# Patient Record
Sex: FEMALE | Race: WHITE | Employment: OTHER | URBAN - METROPOLITAN AREA
[De-identification: names, ages, dates, MRNs, and addresses within clinical notes are randomized per-mention and may not be internally consistent; named-entity substitution may affect disease eponyms.]

---

## 2017-01-30 ENCOUNTER — TELEPHONE (OUTPATIENT)
Dept: INTERNAL MEDICINE | Age: 72
End: 2017-01-30

## 2017-01-30 ENCOUNTER — OFFICE VISIT (OUTPATIENT)
Dept: INTERNAL MEDICINE | Age: 72
End: 2017-01-30

## 2017-01-30 VITALS
DIASTOLIC BLOOD PRESSURE: 82 MMHG | WEIGHT: 107 LBS | SYSTOLIC BLOOD PRESSURE: 136 MMHG | BODY MASS INDEX: 19.69 KG/M2 | HEIGHT: 62 IN

## 2017-01-30 DIAGNOSIS — I25.2 OLD MYOCARDIAL INFARCTION: ICD-10-CM

## 2017-01-30 DIAGNOSIS — I10 ESSENTIAL HYPERTENSION, BENIGN: Primary | ICD-10-CM

## 2017-01-30 DIAGNOSIS — F41.1 ANXIETY STATE: ICD-10-CM

## 2017-01-30 DIAGNOSIS — F41.9 ANXIETY: ICD-10-CM

## 2017-01-30 DIAGNOSIS — I99.9 VASCULAR DISEASE: ICD-10-CM

## 2017-01-30 PROCEDURE — G8427 DOCREV CUR MEDS BY ELIG CLIN: HCPCS | Performed by: INTERNAL MEDICINE

## 2017-01-30 PROCEDURE — G8399 PT W/DXA RESULTS DOCUMENT: HCPCS | Performed by: INTERNAL MEDICINE

## 2017-01-30 PROCEDURE — G8419 CALC BMI OUT NRM PARAM NOF/U: HCPCS | Performed by: INTERNAL MEDICINE

## 2017-01-30 PROCEDURE — 1123F ACP DISCUSS/DSCN MKR DOCD: CPT | Performed by: INTERNAL MEDICINE

## 2017-01-30 PROCEDURE — G8598 ASA/ANTIPLAT THER USED: HCPCS | Performed by: INTERNAL MEDICINE

## 2017-01-30 PROCEDURE — 3014F SCREEN MAMMO DOC REV: CPT | Performed by: INTERNAL MEDICINE

## 2017-01-30 PROCEDURE — 3017F COLORECTAL CA SCREEN DOC REV: CPT | Performed by: INTERNAL MEDICINE

## 2017-01-30 PROCEDURE — 99213 OFFICE O/P EST LOW 20 MIN: CPT | Performed by: INTERNAL MEDICINE

## 2017-01-30 PROCEDURE — 1090F PRES/ABSN URINE INCON ASSESS: CPT | Performed by: INTERNAL MEDICINE

## 2017-01-30 PROCEDURE — 1036F TOBACCO NON-USER: CPT | Performed by: INTERNAL MEDICINE

## 2017-01-30 PROCEDURE — G8484 FLU IMMUNIZE NO ADMIN: HCPCS | Performed by: INTERNAL MEDICINE

## 2017-01-30 PROCEDURE — 4040F PNEUMOC VAC/ADMIN/RCVD: CPT | Performed by: INTERNAL MEDICINE

## 2017-01-30 RX ORDER — LORAZEPAM 0.5 MG/1
TABLET ORAL
Qty: 120 TABLET | Refills: 2 | Status: SHIPPED | OUTPATIENT
Start: 2017-01-30 | End: 2017-04-28 | Stop reason: SDUPTHER

## 2017-01-31 ENCOUNTER — TELEPHONE (OUTPATIENT)
Dept: INTERNAL MEDICINE | Age: 72
End: 2017-01-31

## 2017-02-14 ENCOUNTER — TELEPHONE (OUTPATIENT)
Dept: INTERNAL MEDICINE | Age: 72
End: 2017-02-14

## 2017-04-28 ENCOUNTER — OFFICE VISIT (OUTPATIENT)
Dept: INTERNAL MEDICINE | Age: 72
End: 2017-04-28

## 2017-04-28 VITALS
HEIGHT: 62 IN | DIASTOLIC BLOOD PRESSURE: 92 MMHG | SYSTOLIC BLOOD PRESSURE: 132 MMHG | WEIGHT: 106 LBS | BODY MASS INDEX: 19.51 KG/M2

## 2017-04-28 DIAGNOSIS — F41.1 ANXIETY STATE: Primary | ICD-10-CM

## 2017-04-28 DIAGNOSIS — E78.5 HYPERLIPIDEMIA, UNSPECIFIED HYPERLIPIDEMIA TYPE: ICD-10-CM

## 2017-04-28 DIAGNOSIS — F41.9 ANXIETY: ICD-10-CM

## 2017-04-28 PROCEDURE — 3017F COLORECTAL CA SCREEN DOC REV: CPT | Performed by: INTERNAL MEDICINE

## 2017-04-28 PROCEDURE — 3014F SCREEN MAMMO DOC REV: CPT | Performed by: INTERNAL MEDICINE

## 2017-04-28 PROCEDURE — G8598 ASA/ANTIPLAT THER USED: HCPCS | Performed by: INTERNAL MEDICINE

## 2017-04-28 PROCEDURE — 1123F ACP DISCUSS/DSCN MKR DOCD: CPT | Performed by: INTERNAL MEDICINE

## 2017-04-28 PROCEDURE — 4040F PNEUMOC VAC/ADMIN/RCVD: CPT | Performed by: INTERNAL MEDICINE

## 2017-04-28 PROCEDURE — G8399 PT W/DXA RESULTS DOCUMENT: HCPCS | Performed by: INTERNAL MEDICINE

## 2017-04-28 PROCEDURE — G8419 CALC BMI OUT NRM PARAM NOF/U: HCPCS | Performed by: INTERNAL MEDICINE

## 2017-04-28 PROCEDURE — 1090F PRES/ABSN URINE INCON ASSESS: CPT | Performed by: INTERNAL MEDICINE

## 2017-04-28 PROCEDURE — 99213 OFFICE O/P EST LOW 20 MIN: CPT | Performed by: INTERNAL MEDICINE

## 2017-04-28 PROCEDURE — G8427 DOCREV CUR MEDS BY ELIG CLIN: HCPCS | Performed by: INTERNAL MEDICINE

## 2017-04-28 PROCEDURE — 1036F TOBACCO NON-USER: CPT | Performed by: INTERNAL MEDICINE

## 2017-04-28 RX ORDER — LORAZEPAM 0.5 MG/1
TABLET ORAL
Qty: 120 TABLET | Refills: 2 | Status: SHIPPED | OUTPATIENT
Start: 2017-04-28 | End: 2017-07-28 | Stop reason: SDUPTHER

## 2017-07-28 ENCOUNTER — OFFICE VISIT (OUTPATIENT)
Dept: INTERNAL MEDICINE | Age: 72
End: 2017-07-28

## 2017-07-28 VITALS
WEIGHT: 106 LBS | BODY MASS INDEX: 19.51 KG/M2 | HEIGHT: 62 IN | SYSTOLIC BLOOD PRESSURE: 128 MMHG | DIASTOLIC BLOOD PRESSURE: 84 MMHG

## 2017-07-28 DIAGNOSIS — F41.9 ANXIETY: ICD-10-CM

## 2017-07-28 DIAGNOSIS — I10 ESSENTIAL HYPERTENSION, BENIGN: Primary | ICD-10-CM

## 2017-07-28 DIAGNOSIS — H26.9 CATARACTS, BILATERAL: Chronic | ICD-10-CM

## 2017-07-28 DIAGNOSIS — F41.1 ANXIETY STATE: ICD-10-CM

## 2017-07-28 PROCEDURE — G8598 ASA/ANTIPLAT THER USED: HCPCS | Performed by: INTERNAL MEDICINE

## 2017-07-28 PROCEDURE — 3017F COLORECTAL CA SCREEN DOC REV: CPT | Performed by: INTERNAL MEDICINE

## 2017-07-28 PROCEDURE — 4040F PNEUMOC VAC/ADMIN/RCVD: CPT | Performed by: INTERNAL MEDICINE

## 2017-07-28 PROCEDURE — G8427 DOCREV CUR MEDS BY ELIG CLIN: HCPCS | Performed by: INTERNAL MEDICINE

## 2017-07-28 PROCEDURE — 3014F SCREEN MAMMO DOC REV: CPT | Performed by: INTERNAL MEDICINE

## 2017-07-28 PROCEDURE — G8420 CALC BMI NORM PARAMETERS: HCPCS | Performed by: INTERNAL MEDICINE

## 2017-07-28 PROCEDURE — 1090F PRES/ABSN URINE INCON ASSESS: CPT | Performed by: INTERNAL MEDICINE

## 2017-07-28 PROCEDURE — G8399 PT W/DXA RESULTS DOCUMENT: HCPCS | Performed by: INTERNAL MEDICINE

## 2017-07-28 PROCEDURE — 1036F TOBACCO NON-USER: CPT | Performed by: INTERNAL MEDICINE

## 2017-07-28 PROCEDURE — 1123F ACP DISCUSS/DSCN MKR DOCD: CPT | Performed by: INTERNAL MEDICINE

## 2017-07-28 PROCEDURE — 99213 OFFICE O/P EST LOW 20 MIN: CPT | Performed by: INTERNAL MEDICINE

## 2017-07-28 RX ORDER — LORAZEPAM 0.5 MG/1
TABLET ORAL
Qty: 120 TABLET | Refills: 2 | Status: SHIPPED | OUTPATIENT
Start: 2017-07-28 | End: 2017-10-27 | Stop reason: SDUPTHER

## 2017-10-02 ENCOUNTER — TELEPHONE (OUTPATIENT)
Dept: INTERNAL MEDICINE | Age: 72
End: 2017-10-02

## 2017-10-24 ENCOUNTER — TELEPHONE (OUTPATIENT)
Dept: ENT CLINIC | Age: 72
End: 2017-10-24

## 2017-10-24 NOTE — TELEPHONE ENCOUNTER
Patient is scheduled to come in 11/20/17 for a wax cleaning. Her left ear is clogged and she asked if there is anything she can do while waiting for her appointment? Her call back number is [phone]. Thank you.

## 2017-10-27 ENCOUNTER — TELEPHONE (OUTPATIENT)
Dept: INTERNAL MEDICINE | Age: 72
End: 2017-10-27

## 2017-10-27 ENCOUNTER — OFFICE VISIT (OUTPATIENT)
Dept: INTERNAL MEDICINE | Age: 72
End: 2017-10-27

## 2017-10-27 VITALS
SYSTOLIC BLOOD PRESSURE: 176 MMHG | WEIGHT: 102.2 LBS | BODY MASS INDEX: 18.81 KG/M2 | DIASTOLIC BLOOD PRESSURE: 70 MMHG | HEIGHT: 62 IN

## 2017-10-27 DIAGNOSIS — F41.9 ANXIETY: ICD-10-CM

## 2017-10-27 DIAGNOSIS — F41.1 ANXIETY STATE: ICD-10-CM

## 2017-10-27 PROCEDURE — G8427 DOCREV CUR MEDS BY ELIG CLIN: HCPCS | Performed by: INTERNAL MEDICINE

## 2017-10-27 PROCEDURE — 3014F SCREEN MAMMO DOC REV: CPT | Performed by: INTERNAL MEDICINE

## 2017-10-27 PROCEDURE — 3017F COLORECTAL CA SCREEN DOC REV: CPT | Performed by: INTERNAL MEDICINE

## 2017-10-27 PROCEDURE — 1036F TOBACCO NON-USER: CPT | Performed by: INTERNAL MEDICINE

## 2017-10-27 PROCEDURE — 4040F PNEUMOC VAC/ADMIN/RCVD: CPT | Performed by: INTERNAL MEDICINE

## 2017-10-27 PROCEDURE — G8484 FLU IMMUNIZE NO ADMIN: HCPCS | Performed by: INTERNAL MEDICINE

## 2017-10-27 PROCEDURE — 99213 OFFICE O/P EST LOW 20 MIN: CPT | Performed by: INTERNAL MEDICINE

## 2017-10-27 PROCEDURE — G8510 SCR DEP NEG, NO PLAN REQD: HCPCS | Performed by: INTERNAL MEDICINE

## 2017-10-27 PROCEDURE — 3288F FALL RISK ASSESSMENT DOCD: CPT | Performed by: INTERNAL MEDICINE

## 2017-10-27 PROCEDURE — G8598 ASA/ANTIPLAT THER USED: HCPCS | Performed by: INTERNAL MEDICINE

## 2017-10-27 PROCEDURE — 1123F ACP DISCUSS/DSCN MKR DOCD: CPT | Performed by: INTERNAL MEDICINE

## 2017-10-27 PROCEDURE — G8420 CALC BMI NORM PARAMETERS: HCPCS | Performed by: INTERNAL MEDICINE

## 2017-10-27 PROCEDURE — 1090F PRES/ABSN URINE INCON ASSESS: CPT | Performed by: INTERNAL MEDICINE

## 2017-10-27 PROCEDURE — G8399 PT W/DXA RESULTS DOCUMENT: HCPCS | Performed by: INTERNAL MEDICINE

## 2017-10-27 RX ORDER — LORAZEPAM 0.5 MG/1
TABLET ORAL
Qty: 120 TABLET | Refills: 2 | Status: SHIPPED | OUTPATIENT
Start: 2017-10-27 | End: 2018-01-26 | Stop reason: SDUPTHER

## 2017-10-27 ASSESSMENT — PATIENT HEALTH QUESTIONNAIRE - PHQ9
2. FEELING DOWN, DEPRESSED OR HOPELESS: 0
SUM OF ALL RESPONSES TO PHQ9 QUESTIONS 1 & 2: 0
1. LITTLE INTEREST OR PLEASURE IN DOING THINGS: 0
SUM OF ALL RESPONSES TO PHQ QUESTIONS 1-9: 0

## 2017-10-27 NOTE — TELEPHONE ENCOUNTER
Pt calling concerned about her Blood pressure reading during todays OV. Pt states she feels fine but lives alone and would like to make sure she will be okay- Please call Pt to discuss.

## 2017-10-31 ENCOUNTER — TELEPHONE (OUTPATIENT)
Dept: INTERNAL MEDICINE | Age: 72
End: 2017-10-31

## 2017-10-31 ENCOUNTER — OFFICE VISIT (OUTPATIENT)
Dept: INTERNAL MEDICINE | Age: 72
End: 2017-10-31

## 2017-10-31 VITALS
BODY MASS INDEX: 18.58 KG/M2 | SYSTOLIC BLOOD PRESSURE: 140 MMHG | WEIGHT: 101 LBS | HEIGHT: 62 IN | DIASTOLIC BLOOD PRESSURE: 80 MMHG

## 2017-10-31 DIAGNOSIS — I10 ESSENTIAL HYPERTENSION: Primary | ICD-10-CM

## 2017-10-31 PROCEDURE — 1090F PRES/ABSN URINE INCON ASSESS: CPT | Performed by: INTERNAL MEDICINE

## 2017-10-31 PROCEDURE — G8598 ASA/ANTIPLAT THER USED: HCPCS | Performed by: INTERNAL MEDICINE

## 2017-10-31 PROCEDURE — 4040F PNEUMOC VAC/ADMIN/RCVD: CPT | Performed by: INTERNAL MEDICINE

## 2017-10-31 PROCEDURE — 99213 OFFICE O/P EST LOW 20 MIN: CPT | Performed by: INTERNAL MEDICINE

## 2017-10-31 PROCEDURE — G8399 PT W/DXA RESULTS DOCUMENT: HCPCS | Performed by: INTERNAL MEDICINE

## 2017-10-31 PROCEDURE — 1123F ACP DISCUSS/DSCN MKR DOCD: CPT | Performed by: INTERNAL MEDICINE

## 2017-10-31 PROCEDURE — 3017F COLORECTAL CA SCREEN DOC REV: CPT | Performed by: INTERNAL MEDICINE

## 2017-10-31 PROCEDURE — G8484 FLU IMMUNIZE NO ADMIN: HCPCS | Performed by: INTERNAL MEDICINE

## 2017-10-31 PROCEDURE — 1036F TOBACCO NON-USER: CPT | Performed by: INTERNAL MEDICINE

## 2017-10-31 PROCEDURE — G8427 DOCREV CUR MEDS BY ELIG CLIN: HCPCS | Performed by: INTERNAL MEDICINE

## 2017-10-31 PROCEDURE — 3014F SCREEN MAMMO DOC REV: CPT | Performed by: INTERNAL MEDICINE

## 2017-10-31 PROCEDURE — G8419 CALC BMI OUT NRM PARAM NOF/U: HCPCS | Performed by: INTERNAL MEDICINE

## 2017-10-31 NOTE — TELEPHONE ENCOUNTER
Pt was in the office last week to see Dr. Alex Albarado and her BP was elevated. Pt was told to have her BP checked this week. Pt said that she does not have any place near her that can take her BP. Pt would like to come into the office today because she said that she is not feeling well. Pt said that the top of her head feels weird and she has jitters. Pt aware that Dr. Alex Albarado is booked. Pt asked if she can just head to our office now. Pt advised not to walk in without us calling her back. Please call.

## 2017-11-02 ENCOUNTER — TELEPHONE (OUTPATIENT)
Dept: INTERNAL MEDICINE | Age: 72
End: 2017-11-02

## 2017-11-20 ENCOUNTER — OFFICE VISIT (OUTPATIENT)
Dept: ENT CLINIC | Age: 72
End: 2017-11-20

## 2017-11-20 VITALS
SYSTOLIC BLOOD PRESSURE: 181 MMHG | HEIGHT: 62 IN | HEART RATE: 46 BPM | WEIGHT: 102.6 LBS | TEMPERATURE: 97.6 F | DIASTOLIC BLOOD PRESSURE: 74 MMHG | BODY MASS INDEX: 18.88 KG/M2

## 2017-11-20 DIAGNOSIS — H90.2 EXTERNAL EAR CONDUCTIVE HEARING LOSS: ICD-10-CM

## 2017-11-20 DIAGNOSIS — H61.23 BILATERAL IMPACTED CERUMEN: Primary | ICD-10-CM

## 2017-11-20 PROCEDURE — 69210 REMOVE IMPACTED EAR WAX UNI: CPT | Performed by: OTOLARYNGOLOGY

## 2017-11-20 NOTE — PROGRESS NOTES
Cerumen removed both ears with suction and curettes. Tympanic membranes and middle ears normal.  Now patient hearing is subjectively improved.   Follow up: PRN

## 2018-01-26 ENCOUNTER — OFFICE VISIT (OUTPATIENT)
Dept: INTERNAL MEDICINE | Age: 73
End: 2018-01-26

## 2018-01-26 VITALS
WEIGHT: 99 LBS | HEIGHT: 62 IN | DIASTOLIC BLOOD PRESSURE: 80 MMHG | SYSTOLIC BLOOD PRESSURE: 130 MMHG | BODY MASS INDEX: 18.22 KG/M2

## 2018-01-26 DIAGNOSIS — F41.9 ANXIETY: ICD-10-CM

## 2018-01-26 DIAGNOSIS — R63.4 WEIGHT LOSS: Primary | ICD-10-CM

## 2018-01-26 DIAGNOSIS — F41.1 ANXIETY STATE: ICD-10-CM

## 2018-01-26 LAB
A/G RATIO: 2 (ref 1.1–2.2)
ALBUMIN SERPL-MCNC: 4.3 G/DL (ref 3.4–5)
ALP BLD-CCNC: 81 U/L (ref 40–129)
ALT SERPL-CCNC: 32 U/L (ref 10–40)
ANION GAP SERPL CALCULATED.3IONS-SCNC: 12 MMOL/L (ref 3–16)
AST SERPL-CCNC: 31 U/L (ref 15–37)
BASOPHILS ABSOLUTE: 0 K/UL (ref 0–0.2)
BASOPHILS RELATIVE PERCENT: 0.3 %
BILIRUB SERPL-MCNC: 0.6 MG/DL (ref 0–1)
BUN BLDV-MCNC: 12 MG/DL (ref 7–20)
CALCIUM SERPL-MCNC: 9.2 MG/DL (ref 8.3–10.6)
CHLORIDE BLD-SCNC: 100 MMOL/L (ref 99–110)
CO2: 26 MMOL/L (ref 21–32)
CREAT SERPL-MCNC: <0.5 MG/DL (ref 0.6–1.2)
EOSINOPHILS ABSOLUTE: 0 K/UL (ref 0–0.6)
EOSINOPHILS RELATIVE PERCENT: 0.2 %
GFR AFRICAN AMERICAN: >60
GFR NON-AFRICAN AMERICAN: >60
GLOBULIN: 2.1 G/DL
GLUCOSE BLD-MCNC: 105 MG/DL (ref 70–99)
HCT VFR BLD CALC: 36.5 % (ref 36–48)
HEMOGLOBIN: 12.4 G/DL (ref 12–16)
LYMPHOCYTES ABSOLUTE: 1 K/UL (ref 1–5.1)
LYMPHOCYTES RELATIVE PERCENT: 16.6 %
MCH RBC QN AUTO: 31.3 PG (ref 26–34)
MCHC RBC AUTO-ENTMCNC: 34.1 G/DL (ref 31–36)
MCV RBC AUTO: 91.7 FL (ref 80–100)
MONOCYTES ABSOLUTE: 0.4 K/UL (ref 0–1.3)
MONOCYTES RELATIVE PERCENT: 6.6 %
NEUTROPHILS ABSOLUTE: 4.5 K/UL (ref 1.7–7.7)
NEUTROPHILS RELATIVE PERCENT: 76.3 %
PDW BLD-RTO: 14.3 % (ref 12.4–15.4)
PLATELET # BLD: 141 K/UL (ref 135–450)
PMV BLD AUTO: 9 FL (ref 5–10.5)
POTASSIUM SERPL-SCNC: 4.1 MMOL/L (ref 3.5–5.1)
RBC # BLD: 3.98 M/UL (ref 4–5.2)
SODIUM BLD-SCNC: 138 MMOL/L (ref 136–145)
TOTAL PROTEIN: 6.4 G/DL (ref 6.4–8.2)
TSH SERPL DL<=0.05 MIU/L-ACNC: 1.24 UIU/ML (ref 0.27–4.2)
WBC # BLD: 5.9 K/UL (ref 4–11)

## 2018-01-26 PROCEDURE — G8419 CALC BMI OUT NRM PARAM NOF/U: HCPCS | Performed by: INTERNAL MEDICINE

## 2018-01-26 PROCEDURE — 1036F TOBACCO NON-USER: CPT | Performed by: INTERNAL MEDICINE

## 2018-01-26 PROCEDURE — 4040F PNEUMOC VAC/ADMIN/RCVD: CPT | Performed by: INTERNAL MEDICINE

## 2018-01-26 PROCEDURE — 99213 OFFICE O/P EST LOW 20 MIN: CPT | Performed by: INTERNAL MEDICINE

## 2018-01-26 PROCEDURE — 1090F PRES/ABSN URINE INCON ASSESS: CPT | Performed by: INTERNAL MEDICINE

## 2018-01-26 PROCEDURE — 3017F COLORECTAL CA SCREEN DOC REV: CPT | Performed by: INTERNAL MEDICINE

## 2018-01-26 PROCEDURE — 3014F SCREEN MAMMO DOC REV: CPT | Performed by: INTERNAL MEDICINE

## 2018-01-26 PROCEDURE — G8484 FLU IMMUNIZE NO ADMIN: HCPCS | Performed by: INTERNAL MEDICINE

## 2018-01-26 PROCEDURE — G8427 DOCREV CUR MEDS BY ELIG CLIN: HCPCS | Performed by: INTERNAL MEDICINE

## 2018-01-26 PROCEDURE — 1123F ACP DISCUSS/DSCN MKR DOCD: CPT | Performed by: INTERNAL MEDICINE

## 2018-01-26 PROCEDURE — G8399 PT W/DXA RESULTS DOCUMENT: HCPCS | Performed by: INTERNAL MEDICINE

## 2018-01-26 RX ORDER — LORAZEPAM 0.5 MG/1
TABLET ORAL
Qty: 120 TABLET | Refills: 2 | Status: SHIPPED | OUTPATIENT
Start: 2018-01-26 | End: 2018-04-26 | Stop reason: SDUPTHER

## 2018-01-29 ENCOUNTER — TELEPHONE (OUTPATIENT)
Dept: INTERNAL MEDICINE | Age: 73
End: 2018-01-29

## 2018-01-29 NOTE — TELEPHONE ENCOUNTER
Pt calling asking to speak with nurse. Pt states she did labs last week and forgot to tell the DR everything she ate and wants to discuss with nurse.  Please advise

## 2018-01-30 ENCOUNTER — TELEPHONE (OUTPATIENT)
Dept: INTERNAL MEDICINE | Age: 73
End: 2018-01-30

## 2018-01-30 NOTE — TELEPHONE ENCOUNTER
Call returned to patient. Patient wanted to let Brielle Shay know that she drank ensure and also had chicken and green beans for lunch.     Patient advised to continue with her same regimen and she will return to the office in April for a physical.

## 2018-01-30 NOTE — TELEPHONE ENCOUNTER
Pt said that Dr. Tiffany Ty wants her to gain weight. Pt would like to know what she should eat to help her gain. Pt said that she can't gain weight and keeps losing it. Please call.

## 2018-02-26 ENCOUNTER — OFFICE VISIT (OUTPATIENT)
Dept: INTERNAL MEDICINE | Age: 73
End: 2018-02-26

## 2018-02-26 VITALS
OXYGEN SATURATION: 98 % | BODY MASS INDEX: 18.03 KG/M2 | SYSTOLIC BLOOD PRESSURE: 170 MMHG | WEIGHT: 98 LBS | HEIGHT: 62 IN | DIASTOLIC BLOOD PRESSURE: 82 MMHG | HEART RATE: 55 BPM

## 2018-02-26 DIAGNOSIS — H26.9 CATARACT OF BOTH EYES, UNSPECIFIED CATARACT TYPE: ICD-10-CM

## 2018-02-26 DIAGNOSIS — I25.10 CAD IN NATIVE ARTERY: ICD-10-CM

## 2018-02-26 DIAGNOSIS — Z01.818 PRE-OP EXAM: Primary | ICD-10-CM

## 2018-02-26 DIAGNOSIS — I10 ESSENTIAL HYPERTENSION, BENIGN: ICD-10-CM

## 2018-02-26 PROCEDURE — 3017F COLORECTAL CA SCREEN DOC REV: CPT | Performed by: INTERNAL MEDICINE

## 2018-02-26 PROCEDURE — G8598 ASA/ANTIPLAT THER USED: HCPCS | Performed by: INTERNAL MEDICINE

## 2018-02-26 PROCEDURE — 4040F PNEUMOC VAC/ADMIN/RCVD: CPT | Performed by: INTERNAL MEDICINE

## 2018-02-26 PROCEDURE — 1090F PRES/ABSN URINE INCON ASSESS: CPT | Performed by: INTERNAL MEDICINE

## 2018-02-26 PROCEDURE — G8419 CALC BMI OUT NRM PARAM NOF/U: HCPCS | Performed by: INTERNAL MEDICINE

## 2018-02-26 PROCEDURE — 99214 OFFICE O/P EST MOD 30 MIN: CPT | Performed by: INTERNAL MEDICINE

## 2018-02-26 PROCEDURE — 1123F ACP DISCUSS/DSCN MKR DOCD: CPT | Performed by: INTERNAL MEDICINE

## 2018-02-26 PROCEDURE — G8399 PT W/DXA RESULTS DOCUMENT: HCPCS | Performed by: INTERNAL MEDICINE

## 2018-02-26 PROCEDURE — 1036F TOBACCO NON-USER: CPT | Performed by: INTERNAL MEDICINE

## 2018-02-26 PROCEDURE — G8484 FLU IMMUNIZE NO ADMIN: HCPCS | Performed by: INTERNAL MEDICINE

## 2018-02-26 PROCEDURE — G8427 DOCREV CUR MEDS BY ELIG CLIN: HCPCS | Performed by: INTERNAL MEDICINE

## 2018-02-26 PROCEDURE — 3014F SCREEN MAMMO DOC REV: CPT | Performed by: INTERNAL MEDICINE

## 2018-02-26 NOTE — PROGRESS NOTES
Chief Complaint   Patient presents with    Pre-op Exam     left eye 3/8/18 right eye 3/22//18, can't remember doc [facility], blue cristian for location          Isabell Rowe is a 71 y.o. female who presents for a preoperative physical examination. She is scheduled to have left eye(3/8/18) then right eye (3/22/18) cataract surgery done by Dr. Reece Galarza at [facility]. History of Present Illness:      Blurred vision for the last 6-12 months. Past Medical History:   Diagnosis Date    Acute myocardial infarction, unspecified site, episode of care unspecified     h/o    Anxiety state, unspecified     Coronary atherosclerosis of unspecified type of vessel, native or graft     Essential hypertension, benign     Gastritis 3/27/2015    Osteopenia 4/14/2015    Other and unspecified hyperlipidemia     Screening mammogram 2/26/2009    (Both)Benign    Severe Osteopenia 4/14/2015    Unspecified hearing loss         Review of patient's past surgical history indicates:     Past Surgical History:   Procedure Laterality Date    PTCA      UPPER GASTROINTESTINAL ENDOSCOPY  11/18/14    Dr. Nava Rank                                                   Current Outpatient Prescriptions   Medication Sig Dispense Refill    LORazepam (ATIVAN) 0.5 MG tablet Take 1 tablet in the morning, 1 tablet in the afternoon, and 2 tablets at night before bed. 120 tablet 2    omeprazole (PRILOSEC) 40 MG capsule Take 1 capsule by mouth daily. 30 capsule 3    atorvastatin (LIPITOR) 80 MG tablet Take 1 tablet by mouth daily. 30 tablet 5    Cholecalciferol (VITAMIN D) 2000 UNITS CAPS capsule Take 1 capsule by mouth daily.  azelastine (OPTIVAR) 0.05 % ophthalmic solution 1 drop 2 times daily.  cycloSPORINE (RESTASIS) 0.05 % ophthalmic emulsion 1 drop 2 times daily.  nitroGLYCERIN (NITROSTAT) 0.4 MG SL tablet Place 0.4 mg under the tongue every 5 minutes as needed.  losartan (COZAAR) 100 MG tablet Take 100 mg by mouth daily.

## 2018-03-05 ENCOUNTER — TELEPHONE (OUTPATIENT)
Dept: INTERNAL MEDICINE | Age: 73
End: 2018-03-05

## 2018-03-06 NOTE — TELEPHONE ENCOUNTER
Pt  Is calling back to discuss her medications. Pt would like a call to discuss her current medications.  Please advise

## 2018-03-19 ENCOUNTER — TELEPHONE (OUTPATIENT)
Dept: INTERNAL MEDICINE | Age: 73
End: 2018-03-19

## 2018-04-09 ENCOUNTER — TELEPHONE (OUTPATIENT)
Dept: INTERNAL MEDICINE | Age: 73
End: 2018-04-09

## 2018-04-09 DIAGNOSIS — Z00.00 WELL ADULT EXAM: Primary | ICD-10-CM

## 2018-04-09 DIAGNOSIS — E55.9 VITAMIN D DEFICIENCY: ICD-10-CM

## 2018-04-19 LAB
BASOPHILS ABSOLUTE: 0 /ΜL
BASOPHILS RELATIVE PERCENT: 0.3 %
EOSINOPHILS ABSOLUTE: 0 /ΜL
EOSINOPHILS RELATIVE PERCENT: 0 %
HCT VFR BLD CALC: 35.7 % (ref 36–46)
HEMOGLOBIN: 12.6 G/DL (ref 12–16)
LYMPHOCYTES ABSOLUTE: 0.7 /ΜL
LYMPHOCYTES RELATIVE PERCENT: 14 %
MCH RBC QN AUTO: 31.2 PG
MCHC RBC AUTO-ENTMCNC: 35.2 G/DL
MCV RBC AUTO: 88.5 FL
MONOCYTES ABSOLUTE: 0.4 /ΜL
MONOCYTES RELATIVE PERCENT: 7.5 %
NEUTROPHILS ABSOLUTE: 4.1 /ΜL
NEUTROPHILS RELATIVE PERCENT: 78.2 %
PLATELET # BLD: 152 K/ΜL
PMV BLD AUTO: 8.6 FL
RBC # BLD: 4.04 10^6/ΜL
WBC # BLD: 5.3 10^3/ML

## 2018-04-20 ENCOUNTER — TELEPHONE (OUTPATIENT)
Dept: INTERNAL MEDICINE | Age: 73
End: 2018-04-20

## 2018-04-26 ENCOUNTER — OFFICE VISIT (OUTPATIENT)
Dept: INTERNAL MEDICINE | Age: 73
End: 2018-04-26

## 2018-04-26 VITALS
BODY MASS INDEX: 17.48 KG/M2 | DIASTOLIC BLOOD PRESSURE: 80 MMHG | SYSTOLIC BLOOD PRESSURE: 132 MMHG | WEIGHT: 95 LBS | HEIGHT: 62 IN

## 2018-04-26 DIAGNOSIS — I10 ESSENTIAL HYPERTENSION, BENIGN: ICD-10-CM

## 2018-04-26 DIAGNOSIS — I25.2 OLD MYOCARDIAL INFARCTION: ICD-10-CM

## 2018-04-26 DIAGNOSIS — Z00.00 WELL ADULT EXAM: Primary | ICD-10-CM

## 2018-04-26 DIAGNOSIS — F41.1 ANXIETY STATE: ICD-10-CM

## 2018-04-26 DIAGNOSIS — M81.0 OSTEOPOROSIS, UNSPECIFIED OSTEOPOROSIS TYPE, UNSPECIFIED PATHOLOGICAL FRACTURE PRESENCE: ICD-10-CM

## 2018-04-26 DIAGNOSIS — E55.9 VITAMIN D DEFICIENCY: ICD-10-CM

## 2018-04-26 DIAGNOSIS — F41.9 ANXIETY: ICD-10-CM

## 2018-04-26 DIAGNOSIS — I99.9 VASCULAR DISEASE: ICD-10-CM

## 2018-04-26 PROCEDURE — G8598 ASA/ANTIPLAT THER USED: HCPCS | Performed by: INTERNAL MEDICINE

## 2018-04-26 PROCEDURE — 93000 ELECTROCARDIOGRAM COMPLETE: CPT | Performed by: INTERNAL MEDICINE

## 2018-04-26 PROCEDURE — 4040F PNEUMOC VAC/ADMIN/RCVD: CPT | Performed by: INTERNAL MEDICINE

## 2018-04-26 PROCEDURE — G0439 PPPS, SUBSEQ VISIT: HCPCS | Performed by: INTERNAL MEDICINE

## 2018-04-26 RX ORDER — LORAZEPAM 0.5 MG/1
0.5 TABLET ORAL EVERY 6 HOURS PRN
Qty: 30 TABLET | Refills: 2 | Status: SHIPPED | OUTPATIENT
Start: 2018-04-26 | End: 2018-04-26 | Stop reason: SDUPTHER

## 2018-04-26 RX ORDER — LORAZEPAM 0.5 MG/1
0.5 TABLET ORAL EVERY 6 HOURS PRN
Qty: 30 TABLET | Refills: 2 | Status: SHIPPED | OUTPATIENT
Start: 2018-04-26 | End: 2018-07-25 | Stop reason: SDUPTHER

## 2018-04-26 RX ORDER — LORAZEPAM 0.5 MG/1
TABLET ORAL
Qty: 120 TABLET | Refills: 2 | Status: SHIPPED | OUTPATIENT
Start: 2018-04-26 | End: 2018-05-27

## 2018-06-27 ENCOUNTER — OFFICE VISIT (OUTPATIENT)
Dept: ENT CLINIC | Age: 73
End: 2018-06-27

## 2018-06-27 VITALS
HEIGHT: 62 IN | TEMPERATURE: 97.4 F | BODY MASS INDEX: 17.52 KG/M2 | WEIGHT: 95.2 LBS | SYSTOLIC BLOOD PRESSURE: 165 MMHG | HEART RATE: 49 BPM | DIASTOLIC BLOOD PRESSURE: 74 MMHG

## 2018-06-27 DIAGNOSIS — H61.23 BILATERAL IMPACTED CERUMEN: Primary | ICD-10-CM

## 2018-06-27 DIAGNOSIS — H90.2 EXTERNAL EAR CONDUCTIVE HEARING LOSS: ICD-10-CM

## 2018-06-27 PROCEDURE — 69210 REMOVE IMPACTED EAR WAX UNI: CPT | Performed by: OTOLARYNGOLOGY

## 2018-06-27 PROCEDURE — 99999 PR OFFICE/OUTPT VISIT,PROCEDURE ONLY: CPT | Performed by: OTOLARYNGOLOGY

## 2018-07-19 ENCOUNTER — TELEPHONE (OUTPATIENT)
Dept: INTERNAL MEDICINE | Age: 73
End: 2018-07-19

## 2018-07-19 NOTE — TELEPHONE ENCOUNTER
Patient called back wanting to know if it's ok to read and watch tv because her eyes hurt  Please call pt

## 2018-07-20 ENCOUNTER — TELEPHONE (OUTPATIENT)
Dept: INTERNAL MEDICINE | Age: 73
End: 2018-07-20

## 2018-07-20 NOTE — TELEPHONE ENCOUNTER
Pt reports that she is feeling better and request to speak to [facility]. Thank you.

## 2018-07-20 NOTE — TELEPHONE ENCOUNTER
Call returned to the patient. Patient states that she is feeling better. She no longer has the dizziness when she puts in her eye drops and it does not burn. Advised to call on Monday if the dizziness has returned.

## 2018-07-25 ENCOUNTER — OFFICE VISIT (OUTPATIENT)
Dept: INTERNAL MEDICINE | Age: 73
End: 2018-07-25

## 2018-07-25 VITALS — SYSTOLIC BLOOD PRESSURE: 144 MMHG | BODY MASS INDEX: 17.15 KG/M2 | WEIGHT: 95 LBS | DIASTOLIC BLOOD PRESSURE: 78 MMHG

## 2018-07-25 DIAGNOSIS — F41.9 ANXIETY: ICD-10-CM

## 2018-07-25 PROCEDURE — G8427 DOCREV CUR MEDS BY ELIG CLIN: HCPCS | Performed by: INTERNAL MEDICINE

## 2018-07-25 PROCEDURE — 1036F TOBACCO NON-USER: CPT | Performed by: INTERNAL MEDICINE

## 2018-07-25 PROCEDURE — 1090F PRES/ABSN URINE INCON ASSESS: CPT | Performed by: INTERNAL MEDICINE

## 2018-07-25 PROCEDURE — G8419 CALC BMI OUT NRM PARAM NOF/U: HCPCS | Performed by: INTERNAL MEDICINE

## 2018-07-25 PROCEDURE — 1123F ACP DISCUSS/DSCN MKR DOCD: CPT | Performed by: INTERNAL MEDICINE

## 2018-07-25 PROCEDURE — 1101F PT FALLS ASSESS-DOCD LE1/YR: CPT | Performed by: INTERNAL MEDICINE

## 2018-07-25 PROCEDURE — 3017F COLORECTAL CA SCREEN DOC REV: CPT | Performed by: INTERNAL MEDICINE

## 2018-07-25 PROCEDURE — G8399 PT W/DXA RESULTS DOCUMENT: HCPCS | Performed by: INTERNAL MEDICINE

## 2018-07-25 PROCEDURE — 4040F PNEUMOC VAC/ADMIN/RCVD: CPT | Performed by: INTERNAL MEDICINE

## 2018-07-25 PROCEDURE — 99213 OFFICE O/P EST LOW 20 MIN: CPT | Performed by: INTERNAL MEDICINE

## 2018-07-25 PROCEDURE — G8598 ASA/ANTIPLAT THER USED: HCPCS | Performed by: INTERNAL MEDICINE

## 2018-07-25 RX ORDER — LORAZEPAM 0.5 MG/1
0.5 TABLET ORAL EVERY 6 HOURS PRN
Qty: 120 TABLET | Refills: 2 | Status: SHIPPED | OUTPATIENT
Start: 2018-07-25 | End: 2018-10-24 | Stop reason: SDUPTHER

## 2018-07-25 RX ORDER — LORAZEPAM 0.5 MG/1
0.5 TABLET ORAL EVERY 6 HOURS PRN
Qty: 30 TABLET | Refills: 2 | Status: SHIPPED | OUTPATIENT
Start: 2018-07-25 | End: 2018-07-25 | Stop reason: SDUPTHER

## 2018-07-25 NOTE — PROGRESS NOTES
tobacco: Never Used    Alcohol use Yes    Drug use: No    Sexual activity: Not on file     Other Topics Concern    Not on file     Social History Narrative    No narrative on file         Allergies:  Alcohol; Cephalosporins; Vitamins a & d; and Penicillins    Current Medications:    Prior to Admission medications    Medication Sig Start Date End Date Taking? Authorizing Provider   zoster recombinant adjuvanted vaccine [facility]) 50 MCG SUSR injection Inject 0.5 mLs into the muscle once for 1 dose Repeat in 2 months. 7/25/18 7/25/18 Yes Ria Estrada MD   LORazepam (ATIVAN) 0.5 MG tablet Take 1 tablet by mouth every 6 hours as needed for Anxiety for up to 120 days. . 7/25/18 11/22/18 Yes Ria Estrada MD   omeprazole (PRILOSEC) 40 MG capsule Take 1 capsule by mouth daily. 1/6/15  Yes Ria Estrada MD   atorvastatin (LIPITOR) 80 MG tablet Take 1 tablet by mouth daily. 9/12/14  Yes Ria Estrada MD   Cholecalciferol (VITAMIN D) 2000 UNITS CAPS capsule Take 1 capsule by mouth daily. 10/25/12  Yes Ria Estrada MD   azelastine (OPTIVAR) 0.05 % ophthalmic solution 1 drop 2 times daily. Yes Historical Provider, MD   cycloSPORINE (RESTASIS) 0.05 % ophthalmic emulsion 1 drop 2 times daily. Yes Historical Provider, MD   nitroGLYCERIN (NITROSTAT) 0.4 MG SL tablet Place 0.4 mg under the tongue every 5 minutes as needed. Yes Historical Provider, MD   losartan (COZAAR) 100 MG tablet Take 100 mg by mouth daily. Yes Historical Provider, MD   atenolol (TENORMIN) 50 MG tablet Take 50 mg by mouth daily. Yes Historical Provider, MD   ezetimibe (ZETIA) 10 MG tablet Take 10 mg by mouth daily. Yes Historical Provider, MD   aspirin 325 MG EC tablet Take 325 mg by mouth daily. Yes Historical Provider, MD   clopidogrel (PLAVIX) 75 MG tablet Take 75 mg by mouth daily.    Yes Historical Provider, MD       Physical Exam:  Vital Signs: BP (!) 144/78   Wt 95 lb (43.1 kg)   BMI 17.15 kg/m²   General: Patient appears

## 2018-09-24 ENCOUNTER — TELEPHONE (OUTPATIENT)
Dept: INTERNAL MEDICINE CLINIC | Age: 73
End: 2018-09-24

## 2018-09-24 NOTE — TELEPHONE ENCOUNTER
Please contact pt to discuss her possibly being seen at an earlier time due to transportation issues or other scheduling options for her to receive her meds. Thank you.

## 2018-10-24 ENCOUNTER — OFFICE VISIT (OUTPATIENT)
Dept: INTERNAL MEDICINE CLINIC | Age: 73
End: 2018-10-24
Payer: MEDICARE

## 2018-10-24 VITALS
HEIGHT: 62 IN | BODY MASS INDEX: 17.85 KG/M2 | DIASTOLIC BLOOD PRESSURE: 78 MMHG | SYSTOLIC BLOOD PRESSURE: 136 MMHG | WEIGHT: 97 LBS

## 2018-10-24 DIAGNOSIS — I10 ESSENTIAL HYPERTENSION, BENIGN: Primary | ICD-10-CM

## 2018-10-24 DIAGNOSIS — E78.5 HYPERLIPIDEMIA, UNSPECIFIED HYPERLIPIDEMIA TYPE: ICD-10-CM

## 2018-10-24 DIAGNOSIS — F41.9 ANXIETY: ICD-10-CM

## 2018-10-24 PROCEDURE — 99213 OFFICE O/P EST LOW 20 MIN: CPT | Performed by: INTERNAL MEDICINE

## 2018-10-24 PROCEDURE — G8484 FLU IMMUNIZE NO ADMIN: HCPCS | Performed by: INTERNAL MEDICINE

## 2018-10-24 PROCEDURE — 3017F COLORECTAL CA SCREEN DOC REV: CPT | Performed by: INTERNAL MEDICINE

## 2018-10-24 PROCEDURE — G8598 ASA/ANTIPLAT THER USED: HCPCS | Performed by: INTERNAL MEDICINE

## 2018-10-24 PROCEDURE — 1101F PT FALLS ASSESS-DOCD LE1/YR: CPT | Performed by: INTERNAL MEDICINE

## 2018-10-24 PROCEDURE — 4040F PNEUMOC VAC/ADMIN/RCVD: CPT | Performed by: INTERNAL MEDICINE

## 2018-10-24 PROCEDURE — G8399 PT W/DXA RESULTS DOCUMENT: HCPCS | Performed by: INTERNAL MEDICINE

## 2018-10-24 PROCEDURE — G8427 DOCREV CUR MEDS BY ELIG CLIN: HCPCS | Performed by: INTERNAL MEDICINE

## 2018-10-24 PROCEDURE — G8419 CALC BMI OUT NRM PARAM NOF/U: HCPCS | Performed by: INTERNAL MEDICINE

## 2018-10-24 PROCEDURE — 1090F PRES/ABSN URINE INCON ASSESS: CPT | Performed by: INTERNAL MEDICINE

## 2018-10-24 PROCEDURE — 1036F TOBACCO NON-USER: CPT | Performed by: INTERNAL MEDICINE

## 2018-10-24 PROCEDURE — 1123F ACP DISCUSS/DSCN MKR DOCD: CPT | Performed by: INTERNAL MEDICINE

## 2018-10-24 RX ORDER — LORAZEPAM 0.5 MG/1
0.5 TABLET ORAL EVERY 6 HOURS PRN
Qty: 120 TABLET | Refills: 2 | Status: SHIPPED | OUTPATIENT
Start: 2018-10-24 | End: 2019-01-22 | Stop reason: SDUPTHER

## 2018-10-24 NOTE — PROGRESS NOTES
date: 1/1/1996    Smokeless tobacco: Never Used    Alcohol use Yes    Drug use: No    Sexual activity: Not Asked     Other Topics Concern    None     Social History Narrative    None         Allergies:  Alcohol; Cephalosporins; Vitamins a & d; and Penicillins    Current Medications:    Prior to Admission medications    Medication Sig Start Date End Date Taking? Authorizing Provider   LORazepam (ATIVAN) 0.5 MG tablet Take 1 tablet by mouth every 6 hours as needed for Anxiety for up to 120 days. . 10/24/18 2/21/19 Yes Tomasa Robert MD   omeprazole (PRILOSEC) 40 MG capsule Take 1 capsule by mouth daily. 1/6/15  Yes Tomasa Robert MD   atorvastatin (LIPITOR) 80 MG tablet Take 1 tablet by mouth daily. 9/12/14  Yes Tomasa Robert MD   Cholecalciferol (VITAMIN D) 2000 UNITS CAPS capsule Take 1 capsule by mouth daily. 10/25/12  Yes Tomasa Robert MD   azelastine (OPTIVAR) 0.05 % ophthalmic solution 1 drop 2 times daily. Yes Historical Provider, MD   cycloSPORINE (RESTASIS) 0.05 % ophthalmic emulsion 1 drop 2 times daily. Yes Historical Provider, MD   nitroGLYCERIN (NITROSTAT) 0.4 MG SL tablet Place 0.4 mg under the tongue every 5 minutes as needed. Yes Historical Provider, MD   losartan (COZAAR) 100 MG tablet Take 100 mg by mouth daily. Yes Historical Provider, MD   atenolol (TENORMIN) 50 MG tablet Take 50 mg by mouth daily. Yes Historical Provider, MD   ezetimibe (ZETIA) 10 MG tablet Take 10 mg by mouth daily. Yes Historical Provider, MD   aspirin 325 MG EC tablet Take 325 mg by mouth daily. Yes Historical Provider, MD   clopidogrel (PLAVIX) 75 MG tablet Take 75 mg by mouth daily.    Yes Historical Provider, MD       Physical Exam:  Vital Signs: /78   Ht 5' 2\" (1.575 m)   Wt 97 lb (44 kg)   BMI 17.74 kg/m²   General: Patient appears  non-toxic  HENT: Atraumatic, normocephalic, oral mucosa moist  Lungs:  Clear bilaterally  Heart: Regular rate and rhythm  Abdomen: Non-distended, soft,

## 2018-12-22 ENCOUNTER — HOSPITAL ENCOUNTER (EMERGENCY)
Age: 73
Discharge: HOME | End: 2018-12-23
Payer: MEDICARE

## 2018-12-22 ENCOUNTER — HOSPITAL ENCOUNTER (EMERGENCY)
Age: 73
Discharge: HOME | End: 2018-12-22
Payer: MEDICARE

## 2018-12-22 VITALS
RESPIRATION RATE: 20 BRPM | BODY MASS INDEX: 17.74 KG/M2 | WEIGHT: 97 LBS | SYSTOLIC BLOOD PRESSURE: 162 MMHG | HEART RATE: 56 BPM | OXYGEN SATURATION: 99 % | TEMPERATURE: 97.9 F | DIASTOLIC BLOOD PRESSURE: 60 MMHG

## 2018-12-22 VITALS
WEIGHT: 97 LBS | TEMPERATURE: 97.7 F | RESPIRATION RATE: 18 BRPM | HEART RATE: 52 BPM | BODY MASS INDEX: 17.74 KG/M2 | SYSTOLIC BLOOD PRESSURE: 165 MMHG | DIASTOLIC BLOOD PRESSURE: 84 MMHG | OXYGEN SATURATION: 100 %

## 2018-12-22 DIAGNOSIS — S51.012A SKIN TEAR OF LEFT ELBOW WITHOUT COMPLICATION, INITIAL ENCOUNTER: Primary | ICD-10-CM

## 2018-12-22 DIAGNOSIS — S51.012D SKIN TEAR OF ELBOW WITHOUT COMPLICATION, LEFT, SUBSEQUENT ENCOUNTER: Primary | ICD-10-CM

## 2018-12-22 PROCEDURE — 99282 EMERGENCY DEPT VISIT SF MDM: CPT

## 2018-12-22 ASSESSMENT — ENCOUNTER SYMPTOMS
COUGH: 0
VOMITING: 0
ALLERGIC/IMMUNOLOGIC NEGATIVE: 1
NAUSEA: 0
EYES NEGATIVE: 1
SHORTNESS OF BREATH: 0

## 2018-12-24 NOTE — ED PROVIDER NOTES
EVALUATED BY THE [facility]    CHIEF COMPLAINT  Arm Pain (Left elbow bleeding from skin tear. Patient seen at urgent care and had elbow cauderized this morning treated with abx for infection? Bleeding continued, patient told to come to ED tonight )      HISTORY OF PRESENT ILLNESS  Sabino Cabrera is a 68 y.o. female who presents to the ED for evaluation of bleeding from left elbow. Patient is on plavix. Patient has a skin tear to left elbow that has had persistent bleeding. Patient has been to the urgent care multiple times. Patient has minimal bleeding noted to left elbow. Full ROM. No new trauma. Here for further evaluation. No other complaints, modifying factors or associated symptoms. Nursing notes reviewed. Past Medical History:   Diagnosis Date    Acute myocardial infarction, unspecified site, episode of care unspecified     h/o    Anxiety state, unspecified     Coronary atherosclerosis of unspecified type of vessel, native or graft     Essential hypertension, benign     Gastritis 3/27/2015    Osteopenia 4/14/2015    Other and unspecified hyperlipidemia     Screening mammogram 2/26/2009    (Both)Benign    Severe Osteopenia 4/14/2015    Unspecified hearing loss      Past Surgical History:   Procedure Laterality Date    PTCA      UPPER GASTROINTESTINAL ENDOSCOPY  11/18/14    Dr. Chinchilla Speak     Family History   Problem Relation Age of Onset    Heart Attack Father     Diabetes Mother     Heart Attack Mother      Social History     Social History    Marital status:      Spouse name: N/A    Number of children: N/A    Years of education: N/A     Occupational History    Not on file.      Social History Main Topics    Smoking status: Former Smoker     Years: 8.00     Types: Cigarettes     Quit date: 1/1/1997    Smokeless tobacco: Never Used    Alcohol use Yes    Drug use: No    Sexual activity: Not on file     Other Topics Concern    Not on file     Social History Narrative  No narrative on file     No current facility-administered medications for this encounter. Current Outpatient Prescriptions   Medication Sig Dispense Refill    LORazepam (ATIVAN) 0.5 MG tablet Take 1 tablet by mouth every 6 hours as needed for Anxiety for up to 120 days. . 120 tablet 2    omeprazole (PRILOSEC) 40 MG capsule Take 1 capsule by mouth daily. 30 capsule 3    atorvastatin (LIPITOR) 80 MG tablet Take 1 tablet by mouth daily. 30 tablet 5    Cholecalciferol (VITAMIN D) 2000 UNITS CAPS capsule Take 1 capsule by mouth daily.  azelastine (OPTIVAR) 0.05 % ophthalmic solution 1 drop 2 times daily.  cycloSPORINE (RESTASIS) 0.05 % ophthalmic emulsion 1 drop 2 times daily.  nitroGLYCERIN (NITROSTAT) 0.4 MG SL tablet Place 0.4 mg under the tongue every 5 minutes as needed.  losartan (COZAAR) 100 MG tablet Take 100 mg by mouth daily.  atenolol (TENORMIN) 50 MG tablet Take 50 mg by mouth daily.  ezetimibe (ZETIA) 10 MG tablet Take 10 mg by mouth daily.  aspirin 325 MG EC tablet Take 325 mg by mouth daily.  clopidogrel (PLAVIX) 75 MG tablet Take 75 mg by mouth daily. Allergies   Allergen Reactions    Alcohol     Cephalosporins     Vitamins A & D     Penicillins Rash       REVIEW OF SYSTEMS  6 systems reviewed, pertinent positives per HPI otherwise noted to be negative    PHYSICAL EXAM  BP (!) 165/84   Pulse 52   Temp 97.7 °F (36.5 °C) (Oral)   Resp 18   Wt 97 lb (44 kg)   SpO2 100%   BMI 17.74 kg/m²   GENERAL APPEARANCE: Awake and alert. Cooperative. No acute distress. HEAD: Normocephalic. Atraumatic. EYES: PERRL. EOM's grossly intact. ENT: Mucous membranes are moist.   NECK: Supple. Normal ROM. CHEST: Equal symmetric chest rise. LUNGS: Breathing is unlabored. Speaking comfortably in full sentences. Abdomen: Nondistended. EXTREMITIES: MAEE. No acute deformities. Full ROM of left elbow. SKIN: Warm and dry.  Small skin tear noted to left elbow. Bleeding is very slow. NEUROLOGICAL: Alert and oriented. Strength is 5/5 in all extremities and sensation is intact. Labs:    No results found for this visit on 12/22/18. RADIOLOGY  No results found. ED COURSE/MDM  Patient seen and evaluated here in the ER with the supervising physician available for consultation. Patient presented to the ER with concerns for bleeding. Patient wound was slowly bleeding. surgifoam dressing was applied to left elbow with good hemostasis achieved. Patient VSS. I did not feel that lab studies were necessary. Patient was discharged home in good condition. Patient was given scripts for the following medications. I counseled patient how to take these medications. Discharge Medication List as of 12/22/2018  9:02 PM              CLINICAL IMPRESSION  1. Skin tear of left elbow without complication, initial encounter        Blood pressure (!) 165/84, pulse 52, temperature 97.7 °F (36.5 °C), temperature source Oral, resp. rate 18, weight 97 lb (44 kg), SpO2 100 %. DISPOSITION  Patient was discharged to home in good condition.       DAVE Rubin - JOSE  12/24/18 [id number]

## 2018-12-26 ENCOUNTER — TELEPHONE (OUTPATIENT)
Dept: INTERNAL MEDICINE CLINIC | Age: 73
End: 2018-12-26

## 2018-12-27 ENCOUNTER — OFFICE VISIT (OUTPATIENT)
Dept: INTERNAL MEDICINE CLINIC | Age: 73
End: 2018-12-27
Payer: MEDICARE

## 2018-12-27 VITALS
HEIGHT: 62 IN | SYSTOLIC BLOOD PRESSURE: 138 MMHG | WEIGHT: 99.2 LBS | DIASTOLIC BLOOD PRESSURE: 70 MMHG | BODY MASS INDEX: 18.26 KG/M2

## 2018-12-27 DIAGNOSIS — I10 ESSENTIAL HYPERTENSION, BENIGN: ICD-10-CM

## 2018-12-27 DIAGNOSIS — S51.002S ELBOW WOUND, LEFT, SEQUELA: Primary | ICD-10-CM

## 2018-12-27 PROCEDURE — G8427 DOCREV CUR MEDS BY ELIG CLIN: HCPCS | Performed by: HOSPITALIST

## 2018-12-27 PROCEDURE — G8484 FLU IMMUNIZE NO ADMIN: HCPCS | Performed by: HOSPITALIST

## 2018-12-27 PROCEDURE — G8399 PT W/DXA RESULTS DOCUMENT: HCPCS | Performed by: HOSPITALIST

## 2018-12-27 PROCEDURE — G8598 ASA/ANTIPLAT THER USED: HCPCS | Performed by: HOSPITALIST

## 2018-12-27 PROCEDURE — 1101F PT FALLS ASSESS-DOCD LE1/YR: CPT | Performed by: HOSPITALIST

## 2018-12-27 PROCEDURE — 1123F ACP DISCUSS/DSCN MKR DOCD: CPT | Performed by: HOSPITALIST

## 2018-12-27 PROCEDURE — G8510 SCR DEP NEG, NO PLAN REQD: HCPCS | Performed by: HOSPITALIST

## 2018-12-27 PROCEDURE — 1090F PRES/ABSN URINE INCON ASSESS: CPT | Performed by: HOSPITALIST

## 2018-12-27 PROCEDURE — 4040F PNEUMOC VAC/ADMIN/RCVD: CPT | Performed by: HOSPITALIST

## 2018-12-27 PROCEDURE — 1036F TOBACCO NON-USER: CPT | Performed by: HOSPITALIST

## 2018-12-27 PROCEDURE — G8419 CALC BMI OUT NRM PARAM NOF/U: HCPCS | Performed by: HOSPITALIST

## 2018-12-27 PROCEDURE — 99213 OFFICE O/P EST LOW 20 MIN: CPT | Performed by: HOSPITALIST

## 2018-12-27 PROCEDURE — 3017F COLORECTAL CA SCREEN DOC REV: CPT | Performed by: HOSPITALIST

## 2018-12-27 ASSESSMENT — PATIENT HEALTH QUESTIONNAIRE - PHQ9
2. FEELING DOWN, DEPRESSED OR HOPELESS: 0
1. LITTLE INTEREST OR PLEASURE IN DOING THINGS: 0
SUM OF ALL RESPONSES TO PHQ9 QUESTIONS 1 & 2: 0
SUM OF ALL RESPONSES TO PHQ QUESTIONS 1-9: 0
SUM OF ALL RESPONSES TO PHQ QUESTIONS 1-9: 0

## 2019-01-01 ENCOUNTER — HOSPITAL ENCOUNTER (EMERGENCY)
Age: 74
Discharge: HOME | End: 2019-01-01
Attending: EMERGENCY MEDICINE
Payer: MEDICARE

## 2019-01-01 VITALS
WEIGHT: 97 LBS | RESPIRATION RATE: 16 BRPM | HEART RATE: 57 BPM | SYSTOLIC BLOOD PRESSURE: 162 MMHG | HEIGHT: 61 IN | BODY MASS INDEX: 18.31 KG/M2 | OXYGEN SATURATION: 97 % | TEMPERATURE: 97.4 F | DIASTOLIC BLOOD PRESSURE: 81 MMHG

## 2019-01-01 DIAGNOSIS — S51.012D SKIN TEAR OF LEFT ELBOW WITHOUT COMPLICATION, SUBSEQUENT ENCOUNTER: ICD-10-CM

## 2019-01-01 DIAGNOSIS — M79.89 SWELLING OF LEFT UPPER EXTREMITY: Primary | ICD-10-CM

## 2019-01-01 PROCEDURE — 4500000022 HC ED LEVEL 2 PROCEDURE

## 2019-01-01 PROCEDURE — 99282 EMERGENCY DEPT VISIT SF MDM: CPT

## 2019-01-01 ASSESSMENT — PAIN DESCRIPTION - ORIENTATION: ORIENTATION: LEFT

## 2019-01-01 ASSESSMENT — PAIN SCALES - GENERAL: PAINLEVEL_OUTOF10: 7

## 2019-01-01 ASSESSMENT — PAIN DESCRIPTION - LOCATION: LOCATION: ARM

## 2019-01-02 ENCOUNTER — OFFICE VISIT (OUTPATIENT)
Dept: INTERNAL MEDICINE CLINIC | Age: 74
End: 2019-01-02
Payer: MEDICARE

## 2019-01-02 ENCOUNTER — TELEPHONE (OUTPATIENT)
Dept: INTERNAL MEDICINE CLINIC | Age: 74
End: 2019-01-02

## 2019-01-02 VITALS
BODY MASS INDEX: 18.69 KG/M2 | SYSTOLIC BLOOD PRESSURE: 150 MMHG | WEIGHT: 99 LBS | HEIGHT: 61 IN | DIASTOLIC BLOOD PRESSURE: 84 MMHG

## 2019-01-02 DIAGNOSIS — S40.812D ABRASION OF LEFT UPPER EXTREMITY, SUBSEQUENT ENCOUNTER: Primary | ICD-10-CM

## 2019-01-02 PROCEDURE — 3017F COLORECTAL CA SCREEN DOC REV: CPT | Performed by: INTERNAL MEDICINE

## 2019-01-02 PROCEDURE — 1101F PT FALLS ASSESS-DOCD LE1/YR: CPT | Performed by: INTERNAL MEDICINE

## 2019-01-02 PROCEDURE — G8484 FLU IMMUNIZE NO ADMIN: HCPCS | Performed by: INTERNAL MEDICINE

## 2019-01-02 PROCEDURE — 1090F PRES/ABSN URINE INCON ASSESS: CPT | Performed by: INTERNAL MEDICINE

## 2019-01-02 PROCEDURE — 4040F PNEUMOC VAC/ADMIN/RCVD: CPT | Performed by: INTERNAL MEDICINE

## 2019-01-02 PROCEDURE — G8399 PT W/DXA RESULTS DOCUMENT: HCPCS | Performed by: INTERNAL MEDICINE

## 2019-01-02 PROCEDURE — G8420 CALC BMI NORM PARAMETERS: HCPCS | Performed by: INTERNAL MEDICINE

## 2019-01-02 PROCEDURE — 99213 OFFICE O/P EST LOW 20 MIN: CPT | Performed by: INTERNAL MEDICINE

## 2019-01-02 PROCEDURE — 1036F TOBACCO NON-USER: CPT | Performed by: INTERNAL MEDICINE

## 2019-01-02 PROCEDURE — G8427 DOCREV CUR MEDS BY ELIG CLIN: HCPCS | Performed by: INTERNAL MEDICINE

## 2019-01-02 PROCEDURE — 1123F ACP DISCUSS/DSCN MKR DOCD: CPT | Performed by: INTERNAL MEDICINE

## 2019-01-02 RX ORDER — ASPIRIN 325 MG
325 TABLET ORAL DAILY
COMMUNITY

## 2019-01-03 ENCOUNTER — HOSPITAL ENCOUNTER (EMERGENCY)
Age: 74
Discharge: HOME | End: 2019-01-03
Payer: MEDICARE

## 2019-01-03 VITALS
HEART RATE: 54 BPM | DIASTOLIC BLOOD PRESSURE: 57 MMHG | SYSTOLIC BLOOD PRESSURE: 153 MMHG | RESPIRATION RATE: 18 BRPM | OXYGEN SATURATION: 100 % | TEMPERATURE: 97.9 F

## 2019-01-03 DIAGNOSIS — M79.89 LEFT ARM SWELLING: ICD-10-CM

## 2019-01-03 DIAGNOSIS — S51.012D SKIN TEAR OF LEFT ELBOW WITHOUT COMPLICATION, SUBSEQUENT ENCOUNTER: ICD-10-CM

## 2019-01-03 DIAGNOSIS — Z51.89 VISIT FOR WOUND CHECK: Primary | ICD-10-CM

## 2019-01-03 PROCEDURE — 93971 EXTREMITY STUDY: CPT

## 2019-01-03 PROCEDURE — 99283 EMERGENCY DEPT VISIT LOW MDM: CPT

## 2019-01-04 ENCOUNTER — TELEPHONE (OUTPATIENT)
Dept: INTERNAL MEDICINE CLINIC | Age: 74
End: 2019-01-04

## 2019-01-22 ENCOUNTER — OFFICE VISIT (OUTPATIENT)
Dept: INTERNAL MEDICINE CLINIC | Age: 74
End: 2019-01-22
Payer: MEDICARE

## 2019-01-22 VITALS
HEIGHT: 61 IN | WEIGHT: 99.8 LBS | BODY MASS INDEX: 18.84 KG/M2 | DIASTOLIC BLOOD PRESSURE: 72 MMHG | SYSTOLIC BLOOD PRESSURE: 138 MMHG

## 2019-01-22 DIAGNOSIS — F41.1 ANXIETY STATE: ICD-10-CM

## 2019-01-22 DIAGNOSIS — E78.5 HYPERLIPIDEMIA, UNSPECIFIED HYPERLIPIDEMIA TYPE: ICD-10-CM

## 2019-01-22 DIAGNOSIS — F41.9 ANXIETY: ICD-10-CM

## 2019-01-22 DIAGNOSIS — Z12.31 ENCOUNTER FOR SCREENING MAMMOGRAM FOR BREAST CANCER: Primary | ICD-10-CM

## 2019-01-22 DIAGNOSIS — I10 ESSENTIAL HYPERTENSION, BENIGN: ICD-10-CM

## 2019-01-22 PROCEDURE — 3017F COLORECTAL CA SCREEN DOC REV: CPT | Performed by: INTERNAL MEDICINE

## 2019-01-22 PROCEDURE — G8420 CALC BMI NORM PARAMETERS: HCPCS | Performed by: INTERNAL MEDICINE

## 2019-01-22 PROCEDURE — 1123F ACP DISCUSS/DSCN MKR DOCD: CPT | Performed by: INTERNAL MEDICINE

## 2019-01-22 PROCEDURE — G8399 PT W/DXA RESULTS DOCUMENT: HCPCS | Performed by: INTERNAL MEDICINE

## 2019-01-22 PROCEDURE — G8484 FLU IMMUNIZE NO ADMIN: HCPCS | Performed by: INTERNAL MEDICINE

## 2019-01-22 PROCEDURE — 1036F TOBACCO NON-USER: CPT | Performed by: INTERNAL MEDICINE

## 2019-01-22 PROCEDURE — 1101F PT FALLS ASSESS-DOCD LE1/YR: CPT | Performed by: INTERNAL MEDICINE

## 2019-01-22 PROCEDURE — G8427 DOCREV CUR MEDS BY ELIG CLIN: HCPCS | Performed by: INTERNAL MEDICINE

## 2019-01-22 PROCEDURE — 4040F PNEUMOC VAC/ADMIN/RCVD: CPT | Performed by: INTERNAL MEDICINE

## 2019-01-22 PROCEDURE — 1090F PRES/ABSN URINE INCON ASSESS: CPT | Performed by: INTERNAL MEDICINE

## 2019-01-22 PROCEDURE — 99213 OFFICE O/P EST LOW 20 MIN: CPT | Performed by: INTERNAL MEDICINE

## 2019-01-22 RX ORDER — LORAZEPAM 0.5 MG/1
0.5 TABLET ORAL EVERY 6 HOURS PRN
Qty: 120 TABLET | Refills: 2 | Status: SHIPPED | OUTPATIENT
Start: 2019-01-22 | End: 2019-04-04 | Stop reason: SDUPTHER

## 2019-02-04 ENCOUNTER — TELEPHONE (OUTPATIENT)
Dept: INTERNAL MEDICINE CLINIC | Age: 74
End: 2019-02-04

## 2019-02-12 ENCOUNTER — TELEPHONE (OUTPATIENT)
Dept: INTERNAL MEDICINE CLINIC | Age: 74
End: 2019-02-12

## 2019-04-04 ENCOUNTER — OFFICE VISIT (OUTPATIENT)
Dept: INTERNAL MEDICINE CLINIC | Age: 74
End: 2019-04-04
Payer: MEDICARE

## 2019-04-04 VITALS
SYSTOLIC BLOOD PRESSURE: 130 MMHG | HEIGHT: 61 IN | DIASTOLIC BLOOD PRESSURE: 78 MMHG | WEIGHT: 104 LBS | BODY MASS INDEX: 19.63 KG/M2

## 2019-04-04 DIAGNOSIS — E78.00 PURE HYPERCHOLESTEROLEMIA: Primary | ICD-10-CM

## 2019-04-04 DIAGNOSIS — I25.10 CORONARY ARTERY DISEASE INVOLVING NATIVE HEART WITHOUT ANGINA PECTORIS, UNSPECIFIED VESSEL OR LESION TYPE: ICD-10-CM

## 2019-04-04 DIAGNOSIS — E78.5 HYPERLIPIDEMIA, UNSPECIFIED HYPERLIPIDEMIA TYPE: ICD-10-CM

## 2019-04-04 DIAGNOSIS — F41.9 ANXIETY: ICD-10-CM

## 2019-04-04 DIAGNOSIS — I10 ESSENTIAL HYPERTENSION, BENIGN: ICD-10-CM

## 2019-04-04 PROCEDURE — 3017F COLORECTAL CA SCREEN DOC REV: CPT | Performed by: INTERNAL MEDICINE

## 2019-04-04 PROCEDURE — G8399 PT W/DXA RESULTS DOCUMENT: HCPCS | Performed by: INTERNAL MEDICINE

## 2019-04-04 PROCEDURE — 1090F PRES/ABSN URINE INCON ASSESS: CPT | Performed by: INTERNAL MEDICINE

## 2019-04-04 PROCEDURE — 1123F ACP DISCUSS/DSCN MKR DOCD: CPT | Performed by: INTERNAL MEDICINE

## 2019-04-04 PROCEDURE — 4040F PNEUMOC VAC/ADMIN/RCVD: CPT | Performed by: INTERNAL MEDICINE

## 2019-04-04 PROCEDURE — 99213 OFFICE O/P EST LOW 20 MIN: CPT | Performed by: INTERNAL MEDICINE

## 2019-04-04 PROCEDURE — G8427 DOCREV CUR MEDS BY ELIG CLIN: HCPCS | Performed by: INTERNAL MEDICINE

## 2019-04-04 PROCEDURE — G8598 ASA/ANTIPLAT THER USED: HCPCS | Performed by: INTERNAL MEDICINE

## 2019-04-04 PROCEDURE — G8420 CALC BMI NORM PARAMETERS: HCPCS | Performed by: INTERNAL MEDICINE

## 2019-04-04 PROCEDURE — 1036F TOBACCO NON-USER: CPT | Performed by: INTERNAL MEDICINE

## 2019-04-04 RX ORDER — LORAZEPAM 0.5 MG/1
0.5 TABLET ORAL EVERY 6 HOURS PRN
Qty: 120 TABLET | Refills: 2 | Status: SHIPPED | OUTPATIENT
Start: 2019-04-04 | End: 2019-07-05 | Stop reason: SDUPTHER

## 2019-04-04 ASSESSMENT — PATIENT HEALTH QUESTIONNAIRE - PHQ9
SUM OF ALL RESPONSES TO PHQ QUESTIONS 1-9: 0
2. FEELING DOWN, DEPRESSED OR HOPELESS: 0
1. LITTLE INTEREST OR PLEASURE IN DOING THINGS: 0
SUM OF ALL RESPONSES TO PHQ QUESTIONS 1-9: 0
SUM OF ALL RESPONSES TO PHQ9 QUESTIONS 1 & 2: 0

## 2019-04-04 NOTE — PROGRESS NOTES
CHIEF COMPLAINT: Jesús Porter is a 68 y.o. female who presents for follow-up for anxiety and coronary artery disease    HPI: Patient presented with aloe up of her anxiety and coronary artery disease she's been asymptomatic with regards to her heart still has anxiety needs Ativan there is no evidence addiction or dependency    Review of Systems:   Constitutional:  Denies fever or chills   Eyes:  Denies change in visual acuity   HENT:  Denies nasal congestion or sore throat   Respiratory:  Denies cough or shortness of breath   Cardiovascular:  Denies chest pain or edema   GI:  Denies abdominal pain, nausea, vomiting, bloody stools or diarrhea   :  Denies dysuria   Musculoskeletal:  Denies back pain or joint pain   Integument:  Denies rash   Neurologic:  Denies headache, focal weakness or sensory changes   Endocrine:  Denies polyuria or polydipsia   Lymphatic:  Denies swollen glands   Psychiatric:  Denies depression or anxiety     Past Medical History:        Diagnosis Date    Acute myocardial infarction, unspecified site, episode of care unspecified     h/o    Anxiety state, unspecified     Coronary atherosclerosis of unspecified type of vessel, native or graft     Essential hypertension, benign     Gastritis 3/27/2015    Osteopenia 4/14/2015    Other and unspecified hyperlipidemia     Screening mammogram 2/26/2009    (Both)Benign    Severe Osteopenia 4/14/2015    Unspecified hearing loss        Past Surgical History:        Procedure Laterality Date    PTCA      UPPER GASTROINTESTINAL ENDOSCOPY  11/18/14    Dr. Jessica Thompson       Family History:  Family History   Problem Relation Age of Onset    Heart Attack Father     Diabetes Mother     Heart Attack Mother        Social History:  Social History     Socioeconomic History    Marital status:      Spouse name: None    Number of children: None    Years of education: None    Highest education level: None   Occupational History    None Social Needs    Financial resource strain: None    Food insecurity:     Worry: None     Inability: None    Transportation needs:     Medical: None     Non-medical: None   Tobacco Use    Smoking status: Former Smoker     Packs/day: 1.00     Years: 8.00     Pack years: 8.00     Types: Cigarettes     Last attempt to quit: 1997     Years since quittin.2    Smokeless tobacco: Never Used   Substance and Sexual Activity    Alcohol use: Yes    Drug use: No    Sexual activity: None   Lifestyle    Physical activity:     Days per week: None     Minutes per session: None    Stress: None   Relationships    Social connections:     Talks on phone: None     Gets together: None     Attends [employer] service: None     Active member of club or organization: None     Attends meetings of clubs or organizations: None     Relationship status: None    Intimate partner violence:     Fear of current or ex partner: None     Emotionally abused: None     Physically abused: None     Forced sexual activity: None   Other Topics Concern    None   Social History Narrative    None         Allergies:  Alcohol; Cephalosporins; Vitamins a & d; and Penicillins    Current Medications:    Prior to Admission medications    Medication Sig Start Date End Date Taking? Authorizing Provider   LORazepam (ATIVAN) 0.5 MG tablet Take 1 tablet by mouth every 6 hours as needed for Anxiety for up to 120 days. 19 Yes Kai Black MD   aspirin 325 MG tablet Take 325 mg by mouth daily   Yes Historical Provider, MD   omeprazole (PRILOSEC) 40 MG capsule Take 1 capsule by mouth daily. 1/6/15  Yes Kai Black MD   atorvastatin (LIPITOR) 80 MG tablet Take 1 tablet by mouth daily. 14  Yes Kai Black MD   Cholecalciferol (VITAMIN D) 2000 UNITS CAPS capsule Take 1 capsule by mouth daily. 10/25/12  Yes Kai Black MD   azelastine (OPTIVAR) 0.05 % ophthalmic solution 1 drop 2 times daily.      Yes Historical Provider, MD   cycloSPORINE stable continue present meds  - LORazepam (ATIVAN) 0.5 MG tablet; Take 1 tablet by mouth every 6 hours as needed for Anxiety for up to 120 days. Dispense: 120 tablet; Refill: 2    5. Hyperlipidemia, unspecified hyperlipidemia type  Problems stable check above labs continue present meds for now  - LORazepam (ATIVAN) 0.5 MG tablet; Take 1 tablet by mouth every 6 hours as needed for Anxiety for up to 120 days. Dispense: 120 tablet;  Refill: 2

## 2019-07-05 ENCOUNTER — OFFICE VISIT (OUTPATIENT)
Dept: INTERNAL MEDICINE CLINIC | Age: 74
End: 2019-07-05
Payer: MEDICARE

## 2019-07-05 VITALS
HEIGHT: 61 IN | DIASTOLIC BLOOD PRESSURE: 80 MMHG | WEIGHT: 116 LBS | SYSTOLIC BLOOD PRESSURE: 148 MMHG | BODY MASS INDEX: 21.9 KG/M2

## 2019-07-05 DIAGNOSIS — F41.9 ANXIETY: ICD-10-CM

## 2019-07-05 DIAGNOSIS — F41.1 ANXIETY STATE: ICD-10-CM

## 2019-07-05 DIAGNOSIS — E55.9 VITAMIN D DEFICIENCY: ICD-10-CM

## 2019-07-05 DIAGNOSIS — I10 ESSENTIAL HYPERTENSION, BENIGN: ICD-10-CM

## 2019-07-05 DIAGNOSIS — Z00.00 WELLNESS EXAMINATION: Primary | ICD-10-CM

## 2019-07-05 DIAGNOSIS — I25.2 OLD MYOCARDIAL INFARCTION: ICD-10-CM

## 2019-07-05 DIAGNOSIS — E78.5 HYPERLIPIDEMIA, UNSPECIFIED HYPERLIPIDEMIA TYPE: ICD-10-CM

## 2019-07-05 PROCEDURE — G0439 PPPS, SUBSEQ VISIT: HCPCS | Performed by: INTERNAL MEDICINE

## 2019-07-05 PROCEDURE — 4040F PNEUMOC VAC/ADMIN/RCVD: CPT | Performed by: INTERNAL MEDICINE

## 2019-07-05 PROCEDURE — G8598 ASA/ANTIPLAT THER USED: HCPCS | Performed by: INTERNAL MEDICINE

## 2019-07-05 PROCEDURE — 93000 ELECTROCARDIOGRAM COMPLETE: CPT | Performed by: INTERNAL MEDICINE

## 2019-07-05 PROCEDURE — 3017F COLORECTAL CA SCREEN DOC REV: CPT | Performed by: INTERNAL MEDICINE

## 2019-07-05 PROCEDURE — 1123F ACP DISCUSS/DSCN MKR DOCD: CPT | Performed by: INTERNAL MEDICINE

## 2019-07-05 RX ORDER — LORAZEPAM 0.5 MG/1
0.5 TABLET ORAL EVERY 6 HOURS PRN
Qty: 120 TABLET | Refills: 2 | Status: SHIPPED | OUTPATIENT
Start: 2019-07-05 | End: 2019-10-04 | Stop reason: SDUPTHER

## 2019-07-05 ASSESSMENT — PATIENT HEALTH QUESTIONNAIRE - PHQ9
SUM OF ALL RESPONSES TO PHQ QUESTIONS 1-9: 0
SUM OF ALL RESPONSES TO PHQ QUESTIONS 1-9: 0

## 2019-07-05 ASSESSMENT — LIFESTYLE VARIABLES: HOW OFTEN DO YOU HAVE A DRINK CONTAINING ALCOHOL: 0

## 2019-07-05 ASSESSMENT — ANXIETY QUESTIONNAIRES: GAD7 TOTAL SCORE: 0

## 2019-07-05 NOTE — PROGRESS NOTES
myocardial infarction, unspecified site, episode of care unspecified     h/o    Anxiety state, unspecified     Coronary atherosclerosis of unspecified type of vessel, native or graft     Essential hypertension, benign     Gastritis 3/27/2015    Osteopenia 4/14/2015    Other and unspecified hyperlipidemia     Screening mammogram 2/26/2009    (Both)Benign    Severe Osteopenia 4/14/2015    Unspecified hearing loss      Past Surgical History:   Procedure Laterality Date    PTCA      UPPER GASTROINTESTINAL ENDOSCOPY  11/18/14    Dr. Dora Lin     Family History   Problem Relation Age of Onset    Heart Attack Father     Diabetes Mother     Heart Attack Mother        CareTeam (Including outside providers/suppliers regularly involved in providing care):   Patient Care Team:  Geovany Kendall MD as PCP - General  Geovany Kendall MD as PCP - [facility] Empaneled Provider  Ismael Burden MD as Consulting Physician (Otolaryngology)    Wt Readings from Last 3 Encounters:   07/05/19 116 lb (52.6 kg)   04/04/19 104 lb (47.2 kg)   01/22/19 99 lb 12.8 oz (45.3 kg)     Vitals:    07/05/19 1318 07/05/19 1329   BP: (!) 150/82 (!) 148/80   Weight: 116 lb (52.6 kg)    Height: 5' 1\" (1.549 m)      Body mass index is 21.92 kg/m². Based upon direct observation of the patient, evaluation of cognition reveals recent and remote memory intact.     General Appearance: alert and oriented to person, place and time, well developed and well- nourished, in no acute distress  Skin: warm and dry, no rash or erythema  Head: normocephalic and atraumatic  Eyes: pupils equal, round, and reactive to light, extraocular eye movements intact, conjunctivae normal  ENT: tympanic membrane, external ear and ear canal normal bilaterally, nose without deformity, nasal mucosa and turbinates normal without polyps  Neck: supple and non-tender without mass, no thyromegaly or thyroid nodules, no cervical lymphadenopathy  Pulmonary/Chest: clear to auscultation bilaterally- no wheezes, rales or rhonchi, normal air movement, no respiratory distress  Cardiovascular: normal rate, regular rhythm, normal S1 and S2, no murmurs, rubs, clicks, or gallops, distal pulses intact, no carotid bruits  Abdomen: soft, non-tender, non-distended, normal bowel sounds, no masses or organomegaly  Extremities: no cyanosis, clubbing or edema  Musculoskeletal: normal range of motion, no joint swelling, deformity or tenderness  Neurologic: reflexes normal and symmetric, no cranial nerve deficit, gait, coordination and speech normal    Patient's complete Health Risk Assessment and screening values have been reviewed and are found in Flowsheets. The following problems were reviewed today and where indicated follow up appointments were made and/or referrals ordered. Positive Risk Factor Screenings with Interventions:     General Health:  General  In general, how would you say your health is?: Good  In the past 7 days, have you experienced any of the following? New or Increased Pain, New or Increased Fatigue, Loneliness, Social Isolation, Stress or Anger?: None of These  Do you get the social and emotional support that you need?: Yes  Do you have a Living Will?: (!) No  General Health Risk Interventions:  ·     Health Habits/Nutrition:  Health Habits/Nutrition  Do you exercise for at least 20 minutes 2-3 times per week?: Yes  Have you lost any weight without trying in the past 3 months?: No  Do you eat fewer than 2 meals per day?: No  Have you seen a dentist within the past year?: (!) No  Body mass index is 21.92 kg/m².   Health Habits/Nutrition Interventions:  · Inadequate physical activity:  patient agrees to exercise for at least 150 minutes/week    Hearing/Vision:  Hearing/Vision  Do you or your family notice any trouble with your hearing?: (!) Yes  Do you have difficulty driving, watching TV, or doing any of your daily activities because of your eyesight?: No  Have you had an eye exam within the past year?: (!) No  Hearing/Vision Interventions:  · Hearing concerns:  patient declines any further evaluation/treatment for hearing issues    Personalized Preventive Plan   Current Health Maintenance Status    There is no immunization history on file for this patient. Health Maintenance   Topic Date Due    DTaP/Tdap/Td vaccine (1 - Tdap) 1965    Shingles Vaccine (1 of 2) 1996    Pneumococcal 65+ years Vaccine (1 of 2 - PCV13) 2011    Breast cancer screen  2018    Creatinine monitoring  2019    Potassium monitoring  2019    Annual Wellness Visit (AWV)  2019    Flu vaccine (1) 2019 (Originally 2019)    Lipid screen  2020    Colon cancer screen colonoscopy  2026    DEXA (modify frequency per FRAX score)  Completed    Hepatitis C screen  Completed     Recommendations for Preventive Services Due: see orders and patient instructions/AVS.  . Recommended screening schedule for the next 5-10 years is provided to the patient in written form: see Patient Instructions/AVS.  Annual Wellness Visit     Patient:  Mana Justice                                               : 1945  Age: 68 y.o. MRN: [id number]  Date : 2019      CHIEF COMPLAINT: Mana Justice is a 68 y.o. female who presents for : Physical exam    1. Wellness examination  Only feels okay denies any chest pain shortness of breath or any other problems  - EKG 12 Lead  - Comprehensive Metabolic Panel; Future  - CBC Auto Differential; Future  - Lipid Panel; Future  - TSH without Reflex; Future  - Urinalysis; Future  - Vitamin D 25 Hydroxy; Future  - CARA DIGITAL SCREEN W OR WO CAD BILATERAL; Future    2. Old myocardial infarction  Denies any chest pain shortness of breath or any cardiac symptoms followed by cardiology    3. Essential hypertension, benign  Problem is stable  - LORazepam (ATIVAN) 0.5 MG tablet;  Take 1 tablet by mouth every 6 hours as needed for Anxiety for up to 120 days. Dispense: 120 tablet; Refill: 2    4. Anxiety state  History of anxiety no evidence of addiction or dependency on present meds    5. Vitamin D deficiency    - Vitamin D 25 Hydroxy; Future    6. Anxiety  As above  - LORazepam (ATIVAN) 0.5 MG tablet; Take 1 tablet by mouth every 6 hours as needed for Anxiety for up to 120 days. Dispense: 120 tablet; Refill: 2    7. Hyperlipidemia, unspecified hyperlipidemia type  No complaints no myalgias  - LORazepam (ATIVAN) 0.5 MG tablet; Take 1 tablet by mouth every 6 hours as needed for Anxiety for up to 120 days. Dispense: 120 tablet;  Refill: 2        Patient Active Problem List    Diagnosis Date Noted    Vascular disease 01/30/2017    Cataracts, bilateral 08/02/2016    Severe Osteopenia 04/14/2015    Abdominal aortic aneurysm ([facility] [address] 03/02/2015    Peptic ulcer 03/02/2015    Vitamin D deficiency 02/18/2015    Old myocardial infarction 10/26/2012    Anxiety state     Essential hypertension, benign     Hearing loss     Hyperlipidemia 06/12/2009    Postsurgical percutaneous transluminal coronary angioplasty status 06/12/2009       Constitutional:  Denies fever or chills   Eyes:  Denies change in visual acuity   HENT:  Denies nasal congestion or sore throat   Respiratory:  Denies cough or shortness of breath   Cardiovascular:  Denies chest pain or edema   GI:  Denies abdominal pain, nausea, vomiting, bloody stools or diarrhea   :  Denies dysuria   Musculoskeletal:  Denies back pain or joint pain   Integument:  Denies rash   Neurologic:  Denies headache, focal weakness or sensory changes   Endocrine:  Denies polyuria or polydipsia   Lymphatic:  Denies swollen glands   Psychiatric:  Denies depression or anxiety     Past Medical History:        Diagnosis Date    Acute myocardial infarction, unspecified site, episode of care unspecified     h/o    Anxiety state, unspecified     Coronary atherosclerosis of unspecified type of vessel, native or graft     Essential hypertension, benign     Gastritis 3/27/2015    Osteopenia 2015    Other and unspecified hyperlipidemia     Screening mammogram 2009    (Both)Benign    Severe Osteopenia 2015    Unspecified hearing loss        Past Surgical History:        Procedure Laterality Date    PTCA      UPPER GASTROINTESTINAL ENDOSCOPY  14    Dr. Vincent Connor       Family History:  Family History   Problem Relation Age of Onset    Heart Attack Father     Diabetes Mother     Heart Attack Mother        Social History:  Social History     Socioeconomic History    Marital status:      Spouse name: None    Number of children: None    Years of education: None    Highest education level: None   Occupational History    None   Social Needs    Financial resource strain: None    Food insecurity:     Worry: None     Inability: None    Transportation needs:     Medical: None     Non-medical: None   Tobacco Use    Smoking status: Former Smoker     Packs/day: 1.00     Years: 8.00     Pack years: 8.00     Types: Cigarettes     Last attempt to quit: 1997     Years since quittin.5    Smokeless tobacco: Never Used   Substance and Sexual Activity    Alcohol use:  Yes    Drug use: No    Sexual activity: None   Lifestyle    Physical activity:     Days per week: None     Minutes per session: None    Stress: None   Relationships    Social connections:     Talks on phone: None     Gets together: None     Attends Bahai service: None     Active member of club or organization: None     Attends meetings of clubs or organizations: None     Relationship status: None    Intimate partner violence:     Fear of current or ex partner: None     Emotionally abused: None     Physically abused: None     Forced sexual activity: None   Other Topics Concern    None   Social History Narrative    None         Allergies:  Alcohol; Cephalosporins; Vitamins a & d; and Penicillins    Current Medications:    Prior

## 2019-07-15 ENCOUNTER — TELEPHONE (OUTPATIENT)
Dept: INTERNAL MEDICINE CLINIC | Age: 74
End: 2019-07-15

## 2019-07-15 NOTE — TELEPHONE ENCOUNTER
Patient called wanting to ask a couple questions about :     cycloSPORINE (RESTASIS) 0.05 % ophthalmic emulsion [[id number]    Please call to advise.

## 2019-10-04 ENCOUNTER — OFFICE VISIT (OUTPATIENT)
Dept: INTERNAL MEDICINE CLINIC | Age: 74
End: 2019-10-04
Payer: MEDICARE

## 2019-10-04 VITALS
SYSTOLIC BLOOD PRESSURE: 148 MMHG | BODY MASS INDEX: 22.84 KG/M2 | DIASTOLIC BLOOD PRESSURE: 80 MMHG | WEIGHT: 121 LBS | HEIGHT: 61 IN

## 2019-10-04 DIAGNOSIS — I10 ESSENTIAL HYPERTENSION, BENIGN: Primary | ICD-10-CM

## 2019-10-04 DIAGNOSIS — F41.9 ANXIETY: ICD-10-CM

## 2019-10-04 DIAGNOSIS — E78.5 HYPERLIPIDEMIA, UNSPECIFIED HYPERLIPIDEMIA TYPE: ICD-10-CM

## 2019-10-04 PROCEDURE — 3017F COLORECTAL CA SCREEN DOC REV: CPT | Performed by: INTERNAL MEDICINE

## 2019-10-04 PROCEDURE — 1123F ACP DISCUSS/DSCN MKR DOCD: CPT | Performed by: INTERNAL MEDICINE

## 2019-10-04 PROCEDURE — G8598 ASA/ANTIPLAT THER USED: HCPCS | Performed by: INTERNAL MEDICINE

## 2019-10-04 PROCEDURE — G8484 FLU IMMUNIZE NO ADMIN: HCPCS | Performed by: INTERNAL MEDICINE

## 2019-10-04 PROCEDURE — G8420 CALC BMI NORM PARAMETERS: HCPCS | Performed by: INTERNAL MEDICINE

## 2019-10-04 PROCEDURE — 99213 OFFICE O/P EST LOW 20 MIN: CPT | Performed by: INTERNAL MEDICINE

## 2019-10-04 PROCEDURE — 1036F TOBACCO NON-USER: CPT | Performed by: INTERNAL MEDICINE

## 2019-10-04 PROCEDURE — G8399 PT W/DXA RESULTS DOCUMENT: HCPCS | Performed by: INTERNAL MEDICINE

## 2019-10-04 PROCEDURE — 1090F PRES/ABSN URINE INCON ASSESS: CPT | Performed by: INTERNAL MEDICINE

## 2019-10-04 PROCEDURE — G8427 DOCREV CUR MEDS BY ELIG CLIN: HCPCS | Performed by: INTERNAL MEDICINE

## 2019-10-04 PROCEDURE — 4040F PNEUMOC VAC/ADMIN/RCVD: CPT | Performed by: INTERNAL MEDICINE

## 2019-10-04 RX ORDER — METRONIDAZOLE 7.5 MG/G
GEL TOPICAL
Qty: 1 TUBE | Refills: 0 | Status: ON HOLD | OUTPATIENT
Start: 2019-10-04 | End: 2020-03-30 | Stop reason: HOSPADM

## 2019-10-04 RX ORDER — LORAZEPAM 0.5 MG/1
0.5 TABLET ORAL EVERY 6 HOURS PRN
Qty: 120 TABLET | Refills: 2 | Status: SHIPPED | OUTPATIENT
Start: 2019-10-04 | End: 2020-01-03 | Stop reason: SDUPTHER

## 2020-01-03 ENCOUNTER — OFFICE VISIT (OUTPATIENT)
Dept: INTERNAL MEDICINE CLINIC | Age: 75
End: 2020-01-03
Payer: MEDICARE

## 2020-01-03 VITALS
WEIGHT: 125 LBS | HEIGHT: 61 IN | DIASTOLIC BLOOD PRESSURE: 74 MMHG | SYSTOLIC BLOOD PRESSURE: 160 MMHG | BODY MASS INDEX: 23.6 KG/M2

## 2020-01-03 PROCEDURE — 1090F PRES/ABSN URINE INCON ASSESS: CPT | Performed by: INTERNAL MEDICINE

## 2020-01-03 PROCEDURE — G8399 PT W/DXA RESULTS DOCUMENT: HCPCS | Performed by: INTERNAL MEDICINE

## 2020-01-03 PROCEDURE — G8427 DOCREV CUR MEDS BY ELIG CLIN: HCPCS | Performed by: INTERNAL MEDICINE

## 2020-01-03 PROCEDURE — G8484 FLU IMMUNIZE NO ADMIN: HCPCS | Performed by: INTERNAL MEDICINE

## 2020-01-03 PROCEDURE — 3017F COLORECTAL CA SCREEN DOC REV: CPT | Performed by: INTERNAL MEDICINE

## 2020-01-03 PROCEDURE — 1036F TOBACCO NON-USER: CPT | Performed by: INTERNAL MEDICINE

## 2020-01-03 PROCEDURE — 4040F PNEUMOC VAC/ADMIN/RCVD: CPT | Performed by: INTERNAL MEDICINE

## 2020-01-03 PROCEDURE — 99213 OFFICE O/P EST LOW 20 MIN: CPT | Performed by: INTERNAL MEDICINE

## 2020-01-03 PROCEDURE — G8420 CALC BMI NORM PARAMETERS: HCPCS | Performed by: INTERNAL MEDICINE

## 2020-01-03 PROCEDURE — 1123F ACP DISCUSS/DSCN MKR DOCD: CPT | Performed by: INTERNAL MEDICINE

## 2020-01-03 RX ORDER — LORAZEPAM 0.5 MG/1
0.5 TABLET ORAL EVERY 6 HOURS PRN
Qty: 120 TABLET | Refills: 2 | Status: ON HOLD | OUTPATIENT
Start: 2020-01-03 | End: 2020-03-30 | Stop reason: HOSPADM

## 2020-01-03 RX ORDER — LOSARTAN POTASSIUM AND HYDROCHLOROTHIAZIDE 12.5; 1 MG/1; MG/1
1 TABLET ORAL DAILY
Qty: 90 TABLET | Refills: 1 | Status: ON HOLD | OUTPATIENT
Start: 2020-01-03 | End: 2020-03-30 | Stop reason: HOSPADM

## 2020-01-03 NOTE — PROGRESS NOTES
Follow Up Visit  Established Patient Visit    Patient:  Nasreen Glynn                                               : 1945  Age: 69 y.o. MRN: <[id number]>  Date : 1/3/2020      CHIEF COMPLAINT: Nasreen Glynn is a 69 y.o. female who presents for :  Follow up anxiety and CAD. Has had a lot of stress recently. Her sister is dying. Says her anxiety meds and current does are working well for her. She has not had labs done as she has been so busy with her sister. Aside from stress she has been feeling well. Does not check BPs at home.      Patient Active Problem List    Diagnosis Date Noted    Vascular disease 2017    Cataracts, bilateral 2016    Severe Osteopenia 2015    Abdominal aortic aneurysm ([facility] [address] 2015    Peptic ulcer 2015    Vitamin D deficiency 2015    Old myocardial infarction 10/26/2012    Anxiety state     Essential hypertension, benign     Hearing loss     Hyperlipidemia 2009    Postsurgical percutaneous transluminal coronary angioplasty status 2009       Constitutional:  Denies fever or chills   Eyes:  Denies change in visual acuity   HENT:  Denies nasal congestion or sore throat   Respiratory:  Denies cough or shortness of breath   Cardiovascular:  Denies chest pain or edema   GI:  Denies abdominal pain, nausea, vomiting, bloody stools or diarrhea   :  Denies dysuria   Musculoskeletal:  Denies back pain or joint pain   Integument:  Denies rash   Neurologic:  Denies headache, focal weakness or sensory changes   Endocrine:  Denies polyuria or polydipsia   Lymphatic:  Denies swollen glands   Psychiatric:  Denies depression or anxiety     Past Medical History:        Diagnosis Date    Acute myocardial infarction, unspecified site, episode of care unspecified     h/o    Anxiety state, unspecified     Coronary atherosclerosis of unspecified type of vessel, native or graft     Essential hypertension, benign     Gastritis 3/27/2015    Osteopenia 4/14/2015    Other and unspecified hyperlipidemia     Screening mammogram 2/26/2009    (Both)Benign    Severe Osteopenia 4/14/2015    Unspecified hearing loss        Past Surgical History:        Procedure Laterality Date    PTCA      UPPER GASTROINTESTINAL ENDOSCOPY  11/18/14    Dr. Sara Villalobos         Allergies:  Alcohol; Cephalosporins; Vitamins a & d; and Penicillins    Current Medications:    Prior to Admission medications    Medication Sig Start Date End Date Taking? Authorizing Provider   metroNIDAZOLE (METROGEL) 0.75 % gel Apply topically 2 times daily. 10/4/19  Yes Kristen Sheldon MD   LORazepam (ATIVAN) 0.5 MG tablet Take 1 tablet by mouth every 6 hours as needed for Anxiety for up to 120 days. 10/4/19 2/1/20 Yes Kristen Sheldon MD   aspirin 325 MG tablet Take 325 mg by mouth daily   Yes Historical Provider, MD   omeprazole (PRILOSEC) 40 MG capsule Take 1 capsule by mouth daily. 1/6/15  Yes Kristen Sheldon MD   atorvastatin (LIPITOR) 80 MG tablet Take 1 tablet by mouth daily. 9/12/14  Yes Kristen Sheldon MD   Cholecalciferol (VITAMIN D) 2000 UNITS CAPS capsule Take 1 capsule by mouth daily. 10/25/12  Yes Kristen Sheldon MD   azelastine (OPTIVAR) 0.05 % ophthalmic solution 1 drop 2 times daily. Yes Historical Provider, MD   cycloSPORINE (RESTASIS) 0.05 % ophthalmic emulsion 1 drop 2 times daily. Yes Historical Provider, MD   nitroGLYCERIN (NITROSTAT) 0.4 MG SL tablet Place 0.4 mg under the tongue every 5 minutes as needed. Yes Historical Provider, MD   losartan (COZAAR) 100 MG tablet Take 100 mg by mouth daily. Yes Historical Provider, MD   atenolol (TENORMIN) 50 MG tablet Take 50 mg by mouth daily. Yes Historical Provider, MD   ezetimibe (ZETIA) 10 MG tablet Take 10 mg by mouth daily. Yes Historical Provider, MD   clopidogrel (PLAVIX) 75 MG tablet Take 75 mg by mouth daily.    Yes Historical Provider, MD           Physical Exam:      Constitutional:  Well developed, well nourished, no Indicated 09/09/2014        No results found for: LABA1C     Lab Results   Component Value Date    CREATININE <0.5 (L) 01/26/2018       -----------------------------------------------------------------       Assessment/Plan:     1. Essential hypertension, benign  BP today is 160/78 however pt says she is feeling very stressed and anxious at time of measurement  - Pt to check BPs at home, will order cuff  - CBC Auto Differential; Future  - Comprehensive Metabolic Panel; Future  - Lipid Panel; Future  - LORazepam (ATIVAN) 0.5 MG tablet; Take 1 tablet by mouth every 6 hours as needed for Anxiety for up to 120 days. Dispense: 120 tablet; Refill: 2  - Switch Losartan 100 mg to Hyzaar 100-12.5 mg     2. Hyperlipidemia, unspecified hyperlipidemia type  Problem stable check above labs continue present meds  - CBC Auto Differential; Future  - Comprehensive Metabolic Panel; Future  - Lipid Panel; Future  - LORazepam (ATIVAN) 0.5 MG tablet; Take 1 tablet by mouth every 6 hours as needed for Anxiety for up to 120 days. Dispense: 120 tablet; Refill: 2     3. Anxiety  Refill medication no evidence of addiction or dependency we will follow-up in 3 months for complete physical  - LORazepam (ATIVAN) 0.5 MG tablet; Take 1 tablet by mouth every 6 hours as needed for Anxiety for up to 120 days. Dispense: 120 tablet; Refill: 2  - OARRS reviewed    4. Health Maintenance  -Discussed updating her vaccinations, including pneumococcal however she declines any vaccines. Follow-up in 3 months.

## 2020-01-09 ENCOUNTER — TELEPHONE (OUTPATIENT)
Dept: INTERNAL MEDICINE CLINIC | Age: 75
End: 2020-01-09

## 2020-01-09 NOTE — TELEPHONE ENCOUNTER
Call returned to patient. Patient is concerned that medication could make her dizzy. Patient advised to lay down if she becomes dizzy and call our office.

## 2020-01-22 ENCOUNTER — TELEPHONE (OUTPATIENT)
Dept: INTERNAL MEDICINE CLINIC | Age: 75
End: 2020-01-22

## 2020-01-22 NOTE — TELEPHONE ENCOUNTER
Patient called wanting to know if Dr. Carmita Hernandez still wants her to be on the ensure? Please call to advise.

## 2020-02-04 ENCOUNTER — TELEPHONE (OUTPATIENT)
Dept: INTERNAL MEDICINE CLINIC | Age: 75
End: 2020-02-04

## 2020-02-04 NOTE — TELEPHONE ENCOUNTER
patient is requesting a return call regarding what foods she can eat before her lab work, please advise.

## 2020-03-04 ENCOUNTER — TELEPHONE (OUTPATIENT)
Dept: INTERNAL MEDICINE CLINIC | Age: 75
End: 2020-03-04

## 2020-03-04 NOTE — TELEPHONE ENCOUNTER
Pt states since taking losartan-hydrochlorothiazide (HYZAAR) 100-12.5 MG per tablet her legs has been wobbly. Pt thinks the medication is the reason why her legs are wobbly.      Please be advise

## 2020-03-05 ENCOUNTER — TELEPHONE (OUTPATIENT)
Dept: INTERNAL MEDICINE CLINIC | Age: 75
End: 2020-03-05

## 2020-03-05 RX ORDER — OMEPRAZOLE 40 MG/1
40 CAPSULE, DELAYED RELEASE ORAL DAILY
Qty: 30 CAPSULE | Refills: 3 | Status: SHIPPED | OUTPATIENT
Start: 2020-03-05

## 2020-03-05 NOTE — TELEPHONE ENCOUNTER
Call returned to patient. Per physician:    Medication should not cause legs to feel wobbly. Tried to call patient back she states that she is unable to hear me as her hearing aid is broken. I tried several times to talk to her but she is unable to hear me.

## 2020-03-17 ENCOUNTER — APPOINTMENT (OUTPATIENT)
Dept: GENERAL RADIOLOGY | Age: 75
DRG: 682 | End: 2020-03-17
Payer: MEDICARE

## 2020-03-17 ENCOUNTER — HOSPITAL ENCOUNTER (INPATIENT)
Age: 75
LOS: 13 days | Discharge: NURSING, REHAB OR LONG-TERM CARE | DRG: 682 | End: 2020-03-30
Attending: EMERGENCY MEDICINE | Admitting: INTERNAL MEDICINE
Payer: MEDICARE

## 2020-03-17 ENCOUNTER — APPOINTMENT (OUTPATIENT)
Dept: CT IMAGING | Age: 75
DRG: 682 | End: 2020-03-17
Payer: MEDICARE

## 2020-03-17 PROBLEM — N17.9 AKI (ACUTE KIDNEY INJURY) (HCC): Status: ACTIVE | Noted: 2020-03-17

## 2020-03-17 LAB
A/G RATIO: 1.2 (ref 1.1–2.2)
ALBUMIN SERPL-MCNC: 3.1 G/DL (ref 3.4–5)
ALBUMIN SERPL-MCNC: 4 G/DL (ref 3.4–5)
ALP BLD-CCNC: 104 U/L (ref 40–129)
ALT SERPL-CCNC: 27 U/L (ref 10–40)
ANION GAP SERPL CALCULATED.3IONS-SCNC: 15 MMOL/L (ref 3–16)
ANION GAP SERPL CALCULATED.3IONS-SCNC: 20 MMOL/L (ref 3–16)
AST SERPL-CCNC: 77 U/L (ref 15–37)
BACTERIA: ABNORMAL /HPF
BASOPHILS ABSOLUTE: 0 K/UL (ref 0–0.2)
BASOPHILS RELATIVE PERCENT: 0.2 %
BILIRUB SERPL-MCNC: 0.7 MG/DL (ref 0–1)
BILIRUBIN URINE: NEGATIVE
BLOOD, URINE: ABNORMAL
BUN BLDV-MCNC: 60 MG/DL (ref 7–20)
BUN BLDV-MCNC: 70 MG/DL (ref 7–20)
CALCIUM SERPL-MCNC: 8.7 MG/DL (ref 8.3–10.6)
CALCIUM SERPL-MCNC: 9.5 MG/DL (ref 8.3–10.6)
CHLORIDE BLD-SCNC: 81 MMOL/L (ref 99–110)
CHLORIDE BLD-SCNC: 89 MMOL/L (ref 99–110)
CLARITY: ABNORMAL
CO2: 19 MMOL/L (ref 21–32)
CO2: 20 MMOL/L (ref 21–32)
COLOR: YELLOW
CREAT SERPL-MCNC: 1.6 MG/DL (ref 0.6–1.2)
CREAT SERPL-MCNC: 2.2 MG/DL (ref 0.6–1.2)
EKG ATRIAL RATE: 49 BPM
EKG DIAGNOSIS: NORMAL
EKG P AXIS: 78 DEGREES
EKG P-R INTERVAL: 170 MS
EKG Q-T INTERVAL: 552 MS
EKG QRS DURATION: 96 MS
EKG QTC CALCULATION (BAZETT): 498 MS
EKG R AXIS: -9 DEGREES
EKG T AXIS: -29 DEGREES
EKG VENTRICULAR RATE: 49 BPM
EOSINOPHILS ABSOLUTE: 0 K/UL (ref 0–0.6)
EOSINOPHILS RELATIVE PERCENT: 0 %
EPITHELIAL CELLS, UA: ABNORMAL /HPF (ref 0–5)
GFR AFRICAN AMERICAN: 26
GFR AFRICAN AMERICAN: 38
GFR NON-AFRICAN AMERICAN: 22
GFR NON-AFRICAN AMERICAN: 32
GLOBULIN: 3.4 G/DL
GLUCOSE BLD-MCNC: 102 MG/DL (ref 70–99)
GLUCOSE BLD-MCNC: 81 MG/DL (ref 70–99)
GLUCOSE URINE: NEGATIVE MG/DL
HCT VFR BLD CALC: 27.9 % (ref 36–48)
HEMOGLOBIN: 9.9 G/DL (ref 12–16)
INR BLD: 1.02 (ref 0.86–1.14)
KETONES, URINE: NEGATIVE MG/DL
LACTIC ACID: 2 MMOL/L (ref 0.4–2)
LACTIC ACID: 2.3 MMOL/L (ref 0.4–2)
LEUKOCYTE ESTERASE, URINE: ABNORMAL
LYMPHOCYTES ABSOLUTE: 0.6 K/UL (ref 1–5.1)
LYMPHOCYTES RELATIVE PERCENT: 7.6 %
MAGNESIUM: 2.2 MG/DL (ref 1.8–2.4)
MCH RBC QN AUTO: 27.6 PG (ref 26–34)
MCHC RBC AUTO-ENTMCNC: 35.3 G/DL (ref 31–36)
MCV RBC AUTO: 78 FL (ref 80–100)
MICROSCOPIC EXAMINATION: YES
MONOCYTES ABSOLUTE: 0.6 K/UL (ref 0–1.3)
MONOCYTES RELATIVE PERCENT: 6.9 %
MUCUS: ABNORMAL /LPF
NEUTROPHILS ABSOLUTE: 7 K/UL (ref 1.7–7.7)
NEUTROPHILS RELATIVE PERCENT: 85.3 %
NITRITE, URINE: NEGATIVE
PDW BLD-RTO: 21.5 % (ref 12.4–15.4)
PH UA: 5.5 (ref 5–8)
PHOSPHORUS: 3.1 MG/DL (ref 2.5–4.9)
PLATELET # BLD: 162 K/UL (ref 135–450)
PMV BLD AUTO: 8 FL (ref 5–10.5)
POTASSIUM REFLEX MAGNESIUM: 2.8 MMOL/L (ref 3.5–5.1)
POTASSIUM SERPL-SCNC: 2.9 MMOL/L (ref 3.5–5.1)
POTASSIUM SERPL-SCNC: 3.2 MMOL/L (ref 3.5–5.1)
PROTEIN UA: 100 MG/DL
PROTHROMBIN TIME: 11.8 SEC (ref 10–13.2)
RBC # BLD: 3.58 M/UL (ref 4–5.2)
RBC UA: ABNORMAL /HPF (ref 0–4)
SODIUM BLD-SCNC: 121 MMOL/L (ref 136–145)
SODIUM BLD-SCNC: 123 MMOL/L (ref 136–145)
SPECIFIC GRAVITY UA: 1.02 (ref 1–1.03)
TOTAL CK: 1213 U/L (ref 26–192)
TOTAL PROTEIN: 7.4 G/DL (ref 6.4–8.2)
TROPONIN: 0.09 NG/ML
URINE REFLEX TO CULTURE: YES
URINE TYPE: ABNORMAL
UROBILINOGEN, URINE: 0.2 E.U./DL
WBC # BLD: 8.2 K/UL (ref 4–11)
WBC UA: ABNORMAL /HPF (ref 0–5)

## 2020-03-17 PROCEDURE — 85610 PROTHROMBIN TIME: CPT

## 2020-03-17 PROCEDURE — 6370000000 HC RX 637 (ALT 250 FOR IP): Performed by: INTERNAL MEDICINE

## 2020-03-17 PROCEDURE — 6360000002 HC RX W HCPCS: Performed by: NURSE PRACTITIONER

## 2020-03-17 PROCEDURE — 87040 BLOOD CULTURE FOR BACTERIA: CPT

## 2020-03-17 PROCEDURE — 96367 TX/PROPH/DG ADDL SEQ IV INF: CPT

## 2020-03-17 PROCEDURE — 82550 ASSAY OF CK (CPK): CPT

## 2020-03-17 PROCEDURE — 83605 ASSAY OF LACTIC ACID: CPT

## 2020-03-17 PROCEDURE — 71045 X-RAY EXAM CHEST 1 VIEW: CPT

## 2020-03-17 PROCEDURE — 81001 URINALYSIS AUTO W/SCOPE: CPT

## 2020-03-17 PROCEDURE — 36415 COLL VENOUS BLD VENIPUNCTURE: CPT

## 2020-03-17 PROCEDURE — 96361 HYDRATE IV INFUSION ADD-ON: CPT

## 2020-03-17 PROCEDURE — 73080 X-RAY EXAM OF ELBOW: CPT

## 2020-03-17 PROCEDURE — 85025 COMPLETE CBC W/AUTO DIFF WBC: CPT

## 2020-03-17 PROCEDURE — 70450 CT HEAD/BRAIN W/O DYE: CPT

## 2020-03-17 PROCEDURE — 6360000002 HC RX W HCPCS: Performed by: INTERNAL MEDICINE

## 2020-03-17 PROCEDURE — 84484 ASSAY OF TROPONIN QUANT: CPT

## 2020-03-17 PROCEDURE — 84132 ASSAY OF SERUM POTASSIUM: CPT

## 2020-03-17 PROCEDURE — 93010 ELECTROCARDIOGRAM REPORT: CPT | Performed by: INTERNAL MEDICINE

## 2020-03-17 PROCEDURE — 2580000003 HC RX 258: Performed by: INTERNAL MEDICINE

## 2020-03-17 PROCEDURE — 72125 CT NECK SPINE W/O DYE: CPT

## 2020-03-17 PROCEDURE — 2060000000 HC ICU INTERMEDIATE R&B

## 2020-03-17 PROCEDURE — 2580000003 HC RX 258: Performed by: NURSE PRACTITIONER

## 2020-03-17 PROCEDURE — 80053 COMPREHEN METABOLIC PANEL: CPT

## 2020-03-17 PROCEDURE — 96365 THER/PROPH/DIAG IV INF INIT: CPT

## 2020-03-17 PROCEDURE — 87077 CULTURE AEROBIC IDENTIFY: CPT

## 2020-03-17 PROCEDURE — 73560 X-RAY EXAM OF KNEE 1 OR 2: CPT

## 2020-03-17 PROCEDURE — 83735 ASSAY OF MAGNESIUM: CPT

## 2020-03-17 PROCEDURE — 93005 ELECTROCARDIOGRAM TRACING: CPT | Performed by: NURSE PRACTITIONER

## 2020-03-17 PROCEDURE — 87186 SC STD MICRODIL/AGAR DIL: CPT

## 2020-03-17 PROCEDURE — 99285 EMERGENCY DEPT VISIT HI MDM: CPT

## 2020-03-17 PROCEDURE — P9612 CATHETERIZE FOR URINE SPEC: HCPCS

## 2020-03-17 PROCEDURE — 73590 X-RAY EXAM OF LOWER LEG: CPT

## 2020-03-17 PROCEDURE — 87086 URINE CULTURE/COLONY COUNT: CPT

## 2020-03-17 RX ORDER — LEVOFLOXACIN 5 MG/ML
250 INJECTION, SOLUTION INTRAVENOUS EVERY 24 HOURS
Status: DISCONTINUED | OUTPATIENT
Start: 2020-03-18 | End: 2020-03-23

## 2020-03-17 RX ORDER — POTASSIUM CHLORIDE 7.45 MG/ML
10 INJECTION INTRAVENOUS
Status: COMPLETED | OUTPATIENT
Start: 2020-03-17 | End: 2020-03-17

## 2020-03-17 RX ORDER — PROMETHAZINE HYDROCHLORIDE 25 MG/1
12.5 TABLET ORAL EVERY 6 HOURS PRN
Status: DISCONTINUED | OUTPATIENT
Start: 2020-03-17 | End: 2020-03-30 | Stop reason: HOSPADM

## 2020-03-17 RX ORDER — LEVOFLOXACIN 5 MG/ML
500 INJECTION, SOLUTION INTRAVENOUS ONCE
Status: COMPLETED | OUTPATIENT
Start: 2020-03-17 | End: 2020-03-17

## 2020-03-17 RX ORDER — CLOPIDOGREL BISULFATE 75 MG/1
75 TABLET ORAL DAILY
Status: DISCONTINUED | OUTPATIENT
Start: 2020-03-17 | End: 2020-03-30 | Stop reason: HOSPADM

## 2020-03-17 RX ORDER — SODIUM CHLORIDE 9 MG/ML
INJECTION, SOLUTION INTRAVENOUS CONTINUOUS
Status: DISCONTINUED | OUTPATIENT
Start: 2020-03-17 | End: 2020-03-18

## 2020-03-17 RX ORDER — ASPIRIN 325 MG
325 TABLET ORAL DAILY
Status: DISCONTINUED | OUTPATIENT
Start: 2020-03-17 | End: 2020-03-26

## 2020-03-17 RX ORDER — SODIUM CHLORIDE 0.9 % (FLUSH) 0.9 %
10 SYRINGE (ML) INJECTION EVERY 12 HOURS SCHEDULED
Status: DISCONTINUED | OUTPATIENT
Start: 2020-03-17 | End: 2020-03-30 | Stop reason: HOSPADM

## 2020-03-17 RX ORDER — POTASSIUM CHLORIDE 7.45 MG/ML
10 INJECTION INTRAVENOUS
Status: DISPENSED | OUTPATIENT
Start: 2020-03-17 | End: 2020-03-17

## 2020-03-17 RX ORDER — ATORVASTATIN CALCIUM 80 MG/1
80 TABLET, FILM COATED ORAL DAILY
Status: DISCONTINUED | OUTPATIENT
Start: 2020-03-17 | End: 2020-03-30 | Stop reason: HOSPADM

## 2020-03-17 RX ORDER — ACETAMINOPHEN 650 MG/1
650 SUPPOSITORY RECTAL EVERY 6 HOURS PRN
Status: DISCONTINUED | OUTPATIENT
Start: 2020-03-17 | End: 2020-03-30 | Stop reason: HOSPADM

## 2020-03-17 RX ORDER — ACETAMINOPHEN 325 MG/1
650 TABLET ORAL EVERY 6 HOURS PRN
Status: DISCONTINUED | OUTPATIENT
Start: 2020-03-17 | End: 2020-03-30 | Stop reason: HOSPADM

## 2020-03-17 RX ORDER — HEPARIN SODIUM 5000 [USP'U]/ML
5000 INJECTION, SOLUTION INTRAVENOUS; SUBCUTANEOUS EVERY 8 HOURS SCHEDULED
Status: DISCONTINUED | OUTPATIENT
Start: 2020-03-17 | End: 2020-03-30 | Stop reason: HOSPADM

## 2020-03-17 RX ORDER — ONDANSETRON 2 MG/ML
4 INJECTION INTRAMUSCULAR; INTRAVENOUS EVERY 6 HOURS PRN
Status: DISCONTINUED | OUTPATIENT
Start: 2020-03-17 | End: 2020-03-30 | Stop reason: HOSPADM

## 2020-03-17 RX ORDER — AZELASTINE HYDROCHLORIDE 0.5 MG/ML
1 SOLUTION/ DROPS OPHTHALMIC 2 TIMES DAILY
Status: DISCONTINUED | OUTPATIENT
Start: 2020-03-17 | End: 2020-03-30 | Stop reason: HOSPADM

## 2020-03-17 RX ORDER — SODIUM CHLORIDE 0.9 % (FLUSH) 0.9 %
10 SYRINGE (ML) INJECTION PRN
Status: DISCONTINUED | OUTPATIENT
Start: 2020-03-17 | End: 2020-03-30 | Stop reason: HOSPADM

## 2020-03-17 RX ADMIN — ASPIRIN 325 MG ORAL TABLET 325 MG: 325 PILL ORAL at 20:40

## 2020-03-17 RX ADMIN — POTASSIUM CHLORIDE 10 MEQ: 7.46 INJECTION, SOLUTION INTRAVENOUS at 18:50

## 2020-03-17 RX ADMIN — POTASSIUM CHLORIDE 10 MEQ: 7.46 INJECTION, SOLUTION INTRAVENOUS at 17:47

## 2020-03-17 RX ADMIN — LEVOFLOXACIN 500 MG: 5 INJECTION, SOLUTION INTRAVENOUS at 12:52

## 2020-03-17 RX ADMIN — SODIUM CHLORIDE: 9 INJECTION, SOLUTION INTRAVENOUS at 12:38

## 2020-03-17 RX ADMIN — ATORVASTATIN CALCIUM 80 MG: 80 TABLET, FILM COATED ORAL at 20:40

## 2020-03-17 RX ADMIN — Medication 10 ML: at 20:40

## 2020-03-17 RX ADMIN — HEPARIN SODIUM 5000 UNITS: 5000 INJECTION INTRAVENOUS; SUBCUTANEOUS at 20:40

## 2020-03-17 RX ADMIN — CLOPIDOGREL BISULFATE 75 MG: 75 TABLET ORAL at 20:39

## 2020-03-17 RX ADMIN — POTASSIUM CHLORIDE 10 MEQ: 7.46 INJECTION, SOLUTION INTRAVENOUS at 14:20

## 2020-03-17 RX ADMIN — POTASSIUM CHLORIDE 10 MEQ: 7.46 INJECTION, SOLUTION INTRAVENOUS at 19:42

## 2020-03-17 RX ADMIN — SODIUM CHLORIDE: 9 INJECTION, SOLUTION INTRAVENOUS at 17:06

## 2020-03-17 RX ADMIN — POTASSIUM CHLORIDE 10 MEQ: 7.46 INJECTION, SOLUTION INTRAVENOUS at 20:40

## 2020-03-17 ASSESSMENT — PAIN SCALES - GENERAL: PAINLEVEL_OUTOF10: 0

## 2020-03-17 NOTE — ED NOTES
Bed: 02  Expected date:   Expected time:   Means of arrival: [facility]  Comments:  Miguel Perdomo RN  03/17/20 0857

## 2020-03-17 NOTE — H&P
Hospital Medicine History & Physical      PCP: Monty Resendez MD    Date of Admission: 3/17/2020    Date of Service: Pt seen/examined on 03/17/20 and Admitted to Inpatient with expected LOS greater than two midnights due to medical therapy. Chief Complaint:  Found down      History Of Present Illness:    69 y.o. female who presented to [facility] with being found down at home. Due to patient factors, history obtained by ED staff. Patient was found down at home after family could not get a hold of the patient. Patient is normally high functioning and lives alone. Her house was reportedly very messy and patient was found kneeling on the ground unable to get up. She had noticeable trauma to her legs and face. Patient was unsure of what happened or when it started. She was able to communicate but was very confused. Past Medical History:          Diagnosis Date    Acute myocardial infarction, unspecified site, episode of care unspecified     h/o    Anxiety state, unspecified     Coronary atherosclerosis of unspecified type of vessel, native or graft     Essential hypertension, benign     Gastritis 3/27/2015    Osteopenia 4/14/2015    Other and unspecified hyperlipidemia     Screening mammogram 2/26/2009    (Both)Benign    Severe Osteopenia 4/14/2015    Unspecified hearing loss        Past Surgical History:          Procedure Laterality Date    PTCA      UPPER GASTROINTESTINAL ENDOSCOPY  11/18/14    Dr. Theresa Haney       Medications Prior to Admission:      Prior to Admission medications    Medication Sig Start Date End Date Taking? Authorizing Provider   omeprazole (PRILOSEC) 40 MG delayed release capsule Take 1 capsule by mouth daily 3/5/20   Monty Resendez MD   losartan-hydrochlorothiazide [facility]) 100-12.5 MG per tablet Take 1 tablet by mouth daily 1/3/20   Monty Resendez MD   LORazepam (ATIVAN) 0.5 MG tablet Take 1 tablet by mouth every 6 hours as needed for Anxiety for up to 120 days.  1/3/20 kg)   SpO2 100%   BMI 23.62 kg/m²     General appearance:  No apparent distress, appears stated age and cooperative. HEENT:  Normal cephalic. Large ecchymosis around right eye. Pupils equal, round, and reactive to light. Extra ocular muscles intact. Conjunctivae/corneas clear. Neck: Supple, with full range of motion. No jugular venous distention. Trachea midline. Respiratory:  Normal respiratory effort. Clear to auscultation, bilaterally without Rales/Wheezes/Rhonchi. Cardiovascular:  Regular rate and rhythm with normal S1/S2 without murmurs, rubs or gallops. Abdomen: Soft, non-tender, non-distended with normal bowel sounds. Musculoskeletal:  No clubbing, cyanosis or edema bilaterally. Full range of motion without deformity. Skin: Skin color, texture, turgor normal.  No rashes or lesions. Neurologic:  Neurovascularly intact without any focal sensory/motor deficits.  Cranial nerves: II-XII intact, grossly non-focal.  Psychiatric:  Alert and oriented, thought content appropriate, normal insight  Capillary Refill: Brisk,< 3 seconds   Peripheral Pulses: +2 palpable, equal bilaterally       Labs:     Recent Labs     03/17/20  1142   WBC 8.2   HGB 9.9*   HCT 27.9*        Recent Labs     03/17/20  1142 03/17/20  1308   *  --    K 2.8* 2.9*   CL 81*  --    CO2 20*  --    BUN 70*  --    CREATININE 2.2*  --    CALCIUM 9.5  --      Recent Labs     03/17/20  1142   AST 77*   ALT 27   BILITOT 0.7   ALKPHOS 104     Recent Labs     03/17/20  1142   INR 1.02     Recent Labs     03/17/20  1142   CKTOTAL 1,213*   TROPONINI 0.09*       Urinalysis:      Lab Results   Component Value Date    NITRU Negative 03/17/2020    WBCUA see below 03/17/2020    BACTERIA 3+ 03/17/2020    RBCUA 3-4 03/17/2020    BLOODU LARGE 03/17/2020    SPECGRAV 1.025 03/17/2020    GLUCOSEU Negative 03/17/2020       Radiology:     CXR: I have reviewed the CXR with the following interpretation: no acute disease  EKG:  I have reviewed the EKG with the following interpretation: sinus bradycardia    CT Head WO Contrast   Final Result   Somewhat limited by motion artifact. No intracranial bleed. Contusion along the posterior right skull. CT Cervical Spine WO Contrast   Final Result   No acute abnormality of the cervical spine. XR KNEE RIGHT (1-2 VIEWS)   Final Result   No acute finding of the bilateral elbows, bilateral knees or left   tibia/fibula. XR KNEE LEFT (1-2 VIEWS)   Final Result   No acute finding of the bilateral elbows, bilateral knees or left   tibia/fibula. XR TIBIA FIBULA LEFT (2 VIEWS)   Final Result   No acute finding of the bilateral elbows, bilateral knees or left   tibia/fibula. XR ELBOW RIGHT (MIN 3 VIEWS)   Final Result   No acute finding of the bilateral elbows, bilateral knees or left   tibia/fibula. XR ELBOW LEFT (MIN 3 VIEWS)   Final Result   No acute finding of the bilateral elbows, bilateral knees or left   tibia/fibula. XR CHEST PORTABLE   Final Result   No significant findings or interval change. ASSESSMENT:    Active Hospital Problems    Diagnosis Date Noted    DENAE (acute kidney injury) ([facility] [address] [N17.9] 03/17/2020         PLAN:  Rhabdomyolysis 2/2 fall  - CK >1000 on admission  - started on IVF  - continue to trend CK levels  - no evidence of trauma on imaging    DENAE  - 2/2 rhabdo vs prerenal  - started on IVF  - continue to monitor closely    UTI  - no evidence of sepsis  - noted on UA. Urine culture pending  - started on levaquin due to allergies  - continue IVF    Acute metabolic encephalopathy  - likely due to DENAE, UTI  - continue supportive treatment    Elevated troponin with known CAD  - mild. No chest pain reported  - likely 2/2 DENAE and rhabdo. Low concern for ACS  - continue to trend trops for now  - continue home ASA, plavix, statin. Holding BB due to hypotension    Bradycardia  - unclear etiology.  Appears to be asymptomatic at present  - holding

## 2020-03-17 NOTE — ED PROVIDER NOTES
I independently performed a history and physical on Vraun Sorensen. All diagnostic, treatment, and disposition decisions were made by myself in conjunction with the advanced practice provider.     -Varun Sorensen is a 69 y.o. female with history of hypertension, abdominal aortic aneurysm presents to ED for altered mental status. Per family they had not heard from her in several days, EMS was called they found patient on her knees. She was not able to answer questions appropriately had multiple ecchymosis of various stages around her back her arms or legs with one around her right periorbital region. She is alert to self and age however does not know what is going on to where she is at.  -On arrival patient hypotensive with systolic in the 13O, afebrile. -PE: Ill and frail appearing, disheveled, alert to self, ecchymosis to right eye, dry oral mucosa, scattered ecchymosis to various aspects of her body, abdomen soft, nondistended, nontender to palpation, bradycardic regular rhythm, clear to auscultation bilaterally.  -lab workup significant for: Hyponatremia of 121, hypokalemia of 2.8 with a K of 2.2 and decreased GFR of 20. She has an elevated lactic acidosis of 2.3 total CK of 1200 with a troponin of 0.09 likely secondary to DENAE. Hemoglobin of 9.9 decreased from 12.62 years ago. -CT head and C-spine show somewhat limited by motion artifact. No intracranial bleed. Contusion along the posterior right skull. No acute abnormality of the cervical spine. -CX-ray shows no significant finding or interval change  -X-ray shows no acute finding of the bilateral elbows, bilateral knees or left tib-fib.  -Ua: Moderate leukocyte esterase, negative nitrite, PVC present but obscured.   -Though patient does have an DENAE, patient was treated for UTI with levo ofloxacin given patient's other antibiotic allergies and questionable ability to follow commands for oral medications.  -The Ekg interpreted by me shows  sinus

## 2020-03-17 NOTE — ED PROVIDER NOTES
unspecified     Coronary atherosclerosis of unspecified type of vessel, native or graft     Essential hypertension, benign     Gastritis 3/27/2015    Osteopenia 4/14/2015    Other and unspecified hyperlipidemia     Screening mammogram 2/26/2009    (Both)Benign    Severe Osteopenia 4/14/2015    Unspecified hearing loss        SURGICAL HISTORY    Past Surgical History:   Procedure Laterality Date    PTCA      UPPER GASTROINTESTINAL ENDOSCOPY  11/18/14    Dr. Houston Carlson    Current Outpatient Rx   Medication Sig Dispense Refill    omeprazole (PRILOSEC) 40 MG delayed release capsule Take 1 capsule by mouth daily 30 capsule 3    losartan-hydrochlorothiazide (HYZAAR) 100-12.5 MG per tablet Take 1 tablet by mouth daily 90 tablet 1    LORazepam (ATIVAN) 0.5 MG tablet Take 1 tablet by mouth every 6 hours as needed for Anxiety for up to 120 days. 120 tablet 2    metroNIDAZOLE (METROGEL) 0.75 % gel Apply topically 2 times daily. 1 Tube 0    aspirin 325 MG tablet Take 325 mg by mouth daily      atorvastatin (LIPITOR) 80 MG tablet Take 1 tablet by mouth daily. 30 tablet 5    Cholecalciferol (VITAMIN D) 2000 UNITS CAPS capsule Take 1 capsule by mouth daily.  azelastine (OPTIVAR) 0.05 % ophthalmic solution 1 drop 2 times daily.  cycloSPORINE (RESTASIS) 0.05 % ophthalmic emulsion 1 drop 2 times daily.  nitroGLYCERIN (NITROSTAT) 0.4 MG SL tablet Place 0.4 mg under the tongue every 5 minutes as needed.  atenolol (TENORMIN) 50 MG tablet Take 50 mg by mouth daily.  ezetimibe (ZETIA) 10 MG tablet Take 10 mg by mouth daily.  clopidogrel (PLAVIX) 75 MG tablet Take 75 mg by mouth daily.          ALLERGIES    Allergies   Allergen Reactions    Alcohol     Cephalosporins     Vitamins A & D     Penicillins Rash       FAMILY HISTORY    Family History   Problem Relation Age of Onset    Heart Attack Father     Diabetes Mother     Heart Attack Mother        SOCIAL unlabored. Speaking comfortably in full sentences. Lungs are clear bilaterally to auscultation. Without wheezing, rales, or rhonchi. There is scattered bruising to the back that appears new. Some abrasions to the back as well that are scabbed over. CADIOVASCULAR: Bradycardic rate and rhythm. Normal S1-S2 sounds. No murmurs, rubs, or gallops. Capillary refill is brisk in all 4 extremities. Bilateral lower extremities are equal in size, there is no swelling observed. There is no tenderness to palpation. There is no erythema observed or warmth palpated. GI: Soft, nontender, nondistended with positive bowel sounds. No rebound tenderness, guarding or any peritoneal signs. No masses or hepatosplenomegaly    MUSCULOSKELETAL:  Moving all extremities equally and appropriately. Normal ROM. No pain to palpation of the bilateral upper and lower extremities. Scattered bruising to the bilateral elbows and left lower extremity. There are skin tears to the right elbow that is dried and scabbed over. SKIN: Warm/dry. Skin is intact. No rashes/lesions noted. PSYCHIATRIC: Mood and affect flat. Speech is clear and articulate. NEUROLOGIC: Alert and oriented to name only. No focal motor or sensory deficits. LABS  I have reviewed all labs for this visit.    Results for orders placed or performed during the hospital encounter of 03/17/20   Urinalysis Reflex to Culture   Result Value Ref Range    Color, UA Yellow Straw/Yellow    Clarity, UA CLOUDY (A) Clear    Glucose, Ur Negative Negative mg/dL    Bilirubin Urine Negative Negative    Ketones, Urine Negative Negative mg/dL    Specific Gravity, UA 1.025 1.005 - 1.030    Blood, Urine LARGE (A) Negative    pH, UA 5.5 5.0 - 8.0    Protein,  (A) Negative mg/dL    Urobilinogen, Urine 0.2 <2.0 E.U./dL    Nitrite, Urine Negative Negative    Leukocyte Esterase, Urine MODERATE (A) Negative    Microscopic Examination YES     Urine Type NotGiven     Urine Reflex to Culture Yes Magnesium   Result Value Ref Range    Magnesium 2.20 1.80 - 2.40 mg/dL   Lactic acid, plasma   Result Value Ref Range    Lactic Acid 2.0 0.4 - 2.0 mmol/L   Potassium   Result Value Ref Range    Potassium 2.9 (LL) 3.5 - 5.1 mmol/L   EKG 12 Lead   Result Value Ref Range    Ventricular Rate 49 BPM    Atrial Rate 49 BPM    P-R Interval 170 ms    QRS Duration 96 ms    Q-T Interval 552 ms    QTc Calculation (Bazett) 498 ms    P Axis 78 degrees    R Axis -9 degrees    T Axis -29 degrees    Diagnosis       Marked sinus bradycardiaLeft ventricular hypertrophy with repolarization abnormalityNonspecific ST & T wave changesProlonged QTAbnormal ECGNo previous ECGs availableConfirmed by MU Finch ([id number]) on 3/17/2020 1:28:49 PM       RADIOLOGY    Xr Elbow Left (min 3 Views)    Result Date: 3/17/2020  EXAMINATION: 3 XRAY VIEWS OF THE LEFT TIBIA AND FIBULA; THREE XRAY VIEWS OF THE RIGHT ELBOW; 2 XRAY VIEWS OF THE RIGHT KNEE; 2 XRAY VIEWS OF THE LEFT KNEE; THREE XRAY VIEWS OF THE LEFT ELBOW 3/17/2020 11:35 am COMPARISON: None. HISTORY: ORDERING SYSTEM PROVIDED HISTORY: injury TECHNOLOGIST PROVIDED HISTORY: Reason for exam:->injury Reason for Exam: fall Acuity: Acute Type of Exam: Initial; FINDINGS: Right upper extremity: No acute fracture or dislocation about the elbow. Chronic degenerative change with spurring at the coronoid process of the ulna. No joint effusion or soft tissue swelling. Left upper extremity: No acute fracture or dislocation about the elbow. Once again, there is degenerative change at the proximal ulna involving the coronoid and olecranon processes. No joint effusion or soft tissue swelling. Right lower extremity: Degenerative change and chondrocalcinosis is noted in the right knee. No underlying fracture or dislocation. Soft tissue swelling anteriorly with no underlying joint effusion.  Left lower extremity: Degenerative change and chondrocalcinosis is noted in the left knee, more advanced than on the HISTORY: injury TECHNOLOGIST PROVIDED HISTORY: Reason for exam:->injury Reason for Exam: fall Acuity: Acute Type of Exam: Initial; FINDINGS: Right upper extremity: No acute fracture or dislocation about the elbow. Chronic degenerative change with spurring at the coronoid process of the ulna. No joint effusion or soft tissue swelling. Left upper extremity: No acute fracture or dislocation about the elbow. Once again, there is degenerative change at the proximal ulna involving the coronoid and olecranon processes. No joint effusion or soft tissue swelling. Right lower extremity: Degenerative change and chondrocalcinosis is noted in the right knee. No underlying fracture or dislocation. Soft tissue swelling anteriorly with no underlying joint effusion. Left lower extremity: Degenerative change and chondrocalcinosis is noted in the left knee, more advanced than on the right. The tibia and fibula are intact as is the visualized ankle. No significant soft tissue swelling. No joint effusion. No acute finding of the bilateral elbows, bilateral knees or left tibia/fibula. Xr Knee Right (1-2 Views)    Result Date: 3/17/2020  EXAMINATION: 3 XRAY VIEWS OF THE LEFT TIBIA AND FIBULA; THREE XRAY VIEWS OF THE RIGHT ELBOW; 2 XRAY VIEWS OF THE RIGHT KNEE; 2 XRAY VIEWS OF THE LEFT KNEE; THREE XRAY VIEWS OF THE LEFT ELBOW 3/17/2020 11:35 am COMPARISON: None. HISTORY: ORDERING SYSTEM PROVIDED HISTORY: injury TECHNOLOGIST PROVIDED HISTORY: Reason for exam:->injury Reason for Exam: fall Acuity: Acute Type of Exam: Initial; FINDINGS: Right upper extremity: No acute fracture or dislocation about the elbow. Chronic degenerative change with spurring at the coronoid process of the ulna. No joint effusion or soft tissue swelling. Left upper extremity: No acute fracture or dislocation about the elbow. Once again, there is degenerative change at the proximal ulna involving the coronoid and olecranon processes.   No joint effusion or soft tissue swelling. Right lower extremity: Degenerative change and chondrocalcinosis is noted in the right knee. No underlying fracture or dislocation. Soft tissue swelling anteriorly with no underlying joint effusion. Left lower extremity: Degenerative change and chondrocalcinosis is noted in the left knee, more advanced than on the right. The tibia and fibula are intact as is the visualized ankle. No significant soft tissue swelling. No joint effusion. No acute finding of the bilateral elbows, bilateral knees or left tibia/fibula. Xr Tibia Fibula Left (2 Views)    Result Date: 3/17/2020  EXAMINATION: 3 XRAY VIEWS OF THE LEFT TIBIA AND FIBULA; THREE XRAY VIEWS OF THE RIGHT ELBOW; 2 XRAY VIEWS OF THE RIGHT KNEE; 2 XRAY VIEWS OF THE LEFT KNEE; THREE XRAY VIEWS OF THE LEFT ELBOW 3/17/2020 11:35 am COMPARISON: None. HISTORY: ORDERING SYSTEM PROVIDED HISTORY: injury TECHNOLOGIST PROVIDED HISTORY: Reason for exam:->injury Reason for Exam: fall Acuity: Acute Type of Exam: Initial; FINDINGS: Right upper extremity: No acute fracture or dislocation about the elbow. Chronic degenerative change with spurring at the coronoid process of the ulna. No joint effusion or soft tissue swelling. Left upper extremity: No acute fracture or dislocation about the elbow. Once again, there is degenerative change at the proximal ulna involving the coronoid and olecranon processes. No joint effusion or soft tissue swelling. Right lower extremity: Degenerative change and chondrocalcinosis is noted in the right knee. No underlying fracture or dislocation. Soft tissue swelling anteriorly with no underlying joint effusion. Left lower extremity: Degenerative change and chondrocalcinosis is noted in the left knee, more advanced than on the right. The tibia and fibula are intact as is the visualized ankle. No significant soft tissue swelling. No joint effusion.      No acute finding of the bilateral elbows, bilateral knees or considered and evaluated Arianne De La Paz for the following diagnoses:  DIABETES, INTRACRANIAL HEMORRHAGE, MENINGITIS, SEPSIS SUBARACHNOID HEMORRHAGE, SUBDURAL HEMATOMA, & STROKE. The total critical care time spent while evaluating and treating this patient was at least 50 minutes. This excludes time spent doing separately billable procedures. This includes time at the bedside, data interpretation, medication management, obtaining critical history from collateral sources if the patient is unable to provide it directly, and physician consultation. Specifics of interventions taken and potentially life-threatening diagnostic considerations are listed above in the medical decision making. CLINICAL IMPRESSION    1. DENAE (acute kidney injury) ([facility] [address]    2. Non-traumatic rhabdomyolysis    3. Hypokalemia    4. Hyponatremia    5. Elevated troponin    6. Elevated lactic acid level    7. Multiple falls    8. Contusion, multiple sites    9. Acute cystitis without hematuria       DISPOSITION  Admitted. Comment: Please note this report has been produced using speech recognition software and may contain errors related to that system including errors in grammar, punctuation, and spelling, as well as words and phrases that may be inappropriate. If there are any questions or concerns please feel free to contact the dictating provider for clarification.         DAVE Mccoy - JOSE  03/17/20 [id number]

## 2020-03-17 NOTE — ED NOTES
1425 - PS to hospitalists  Re: DENAE, rhabdo, falls  1435 - Dr Taryn Silva called back to speak with Dr Kyle Salazar  03/17/20 1432

## 2020-03-18 LAB
ALBUMIN SERPL-MCNC: 2.9 G/DL (ref 3.4–5)
ALBUMIN SERPL-MCNC: 3.1 G/DL (ref 3.4–5)
ALBUMIN SERPL-MCNC: 3.1 G/DL (ref 3.4–5)
ANION GAP SERPL CALCULATED.3IONS-SCNC: 12 MMOL/L (ref 3–16)
ANION GAP SERPL CALCULATED.3IONS-SCNC: 13 MMOL/L (ref 3–16)
ANION GAP SERPL CALCULATED.3IONS-SCNC: 16 MMOL/L (ref 3–16)
BUN BLDV-MCNC: 43 MG/DL (ref 7–20)
BUN BLDV-MCNC: 46 MG/DL (ref 7–20)
BUN BLDV-MCNC: 55 MG/DL (ref 7–20)
CALCIUM SERPL-MCNC: 8.4 MG/DL (ref 8.3–10.6)
CALCIUM SERPL-MCNC: 8.5 MG/DL (ref 8.3–10.6)
CALCIUM SERPL-MCNC: 8.8 MG/DL (ref 8.3–10.6)
CHLORIDE BLD-SCNC: 92 MMOL/L (ref 99–110)
CHLORIDE BLD-SCNC: 94 MMOL/L (ref 99–110)
CHLORIDE BLD-SCNC: 96 MMOL/L (ref 99–110)
CO2: 19 MMOL/L (ref 21–32)
CO2: 21 MMOL/L (ref 21–32)
CO2: 24 MMOL/L (ref 21–32)
CREAT SERPL-MCNC: 1.1 MG/DL (ref 0.6–1.2)
CREAT SERPL-MCNC: 1.1 MG/DL (ref 0.6–1.2)
CREAT SERPL-MCNC: 1.4 MG/DL (ref 0.6–1.2)
GFR AFRICAN AMERICAN: 45
GFR AFRICAN AMERICAN: 59
GFR AFRICAN AMERICAN: 59
GFR NON-AFRICAN AMERICAN: 37
GFR NON-AFRICAN AMERICAN: 49
GFR NON-AFRICAN AMERICAN: 49
GLUCOSE BLD-MCNC: 111 MG/DL (ref 70–99)
GLUCOSE BLD-MCNC: 73 MG/DL (ref 70–99)
GLUCOSE BLD-MCNC: 92 MG/DL (ref 70–99)
HCT VFR BLD CALC: 23.4 % (ref 36–48)
HEMOGLOBIN: 8.3 G/DL (ref 12–16)
MAGNESIUM: 2.2 MG/DL (ref 1.8–2.4)
MCH RBC QN AUTO: 27.5 PG (ref 26–34)
MCHC RBC AUTO-ENTMCNC: 35.5 G/DL (ref 31–36)
MCV RBC AUTO: 77.6 FL (ref 80–100)
OCCULT BLOOD DIAGNOSTIC: NORMAL
PDW BLD-RTO: 21.7 % (ref 12.4–15.4)
PHOSPHORUS: 1.3 MG/DL (ref 2.5–4.9)
PHOSPHORUS: 1.9 MG/DL (ref 2.5–4.9)
PHOSPHORUS: 2.1 MG/DL (ref 2.5–4.9)
PLATELET # BLD: 102 K/UL (ref 135–450)
PMV BLD AUTO: 8.2 FL (ref 5–10.5)
POTASSIUM SERPL-SCNC: 2.5 MMOL/L (ref 3.5–5.1)
POTASSIUM SERPL-SCNC: 2.8 MMOL/L (ref 3.5–5.1)
POTASSIUM SERPL-SCNC: 3.8 MMOL/L (ref 3.5–5.1)
RBC # BLD: 3.02 M/UL (ref 4–5.2)
SODIUM BLD-SCNC: 126 MMOL/L (ref 136–145)
SODIUM BLD-SCNC: 129 MMOL/L (ref 136–145)
SODIUM BLD-SCNC: 132 MMOL/L (ref 136–145)
TOTAL CK: 1741 U/L (ref 26–192)
TROPONIN: 0.04 NG/ML
WBC # BLD: 5.9 K/UL (ref 4–11)

## 2020-03-18 PROCEDURE — 2580000003 HC RX 258: Performed by: INTERNAL MEDICINE

## 2020-03-18 PROCEDURE — 2500000003 HC RX 250 WO HCPCS: Performed by: INTERNAL MEDICINE

## 2020-03-18 PROCEDURE — 85027 COMPLETE CBC AUTOMATED: CPT

## 2020-03-18 PROCEDURE — 97162 PT EVAL MOD COMPLEX 30 MIN: CPT

## 2020-03-18 PROCEDURE — 80069 RENAL FUNCTION PANEL: CPT

## 2020-03-18 PROCEDURE — 97116 GAIT TRAINING THERAPY: CPT

## 2020-03-18 PROCEDURE — 97530 THERAPEUTIC ACTIVITIES: CPT

## 2020-03-18 PROCEDURE — 97166 OT EVAL MOD COMPLEX 45 MIN: CPT

## 2020-03-18 PROCEDURE — 6370000000 HC RX 637 (ALT 250 FOR IP): Performed by: NURSE PRACTITIONER

## 2020-03-18 PROCEDURE — 6360000002 HC RX W HCPCS: Performed by: INTERNAL MEDICINE

## 2020-03-18 PROCEDURE — 83735 ASSAY OF MAGNESIUM: CPT

## 2020-03-18 PROCEDURE — 97535 SELF CARE MNGMENT TRAINING: CPT

## 2020-03-18 PROCEDURE — 82550 ASSAY OF CK (CPK): CPT

## 2020-03-18 PROCEDURE — G0328 FECAL BLOOD SCRN IMMUNOASSAY: HCPCS

## 2020-03-18 PROCEDURE — 6370000000 HC RX 637 (ALT 250 FOR IP): Performed by: INTERNAL MEDICINE

## 2020-03-18 PROCEDURE — 36415 COLL VENOUS BLD VENIPUNCTURE: CPT

## 2020-03-18 PROCEDURE — 84484 ASSAY OF TROPONIN QUANT: CPT

## 2020-03-18 PROCEDURE — 2060000000 HC ICU INTERMEDIATE R&B

## 2020-03-18 RX ORDER — POTASSIUM CHLORIDE 20 MEQ/1
20 TABLET, EXTENDED RELEASE ORAL 2 TIMES DAILY
Status: DISCONTINUED | OUTPATIENT
Start: 2020-03-18 | End: 2020-03-18

## 2020-03-18 RX ORDER — POTASSIUM CHLORIDE 20 MEQ/1
40 TABLET, EXTENDED RELEASE ORAL ONCE
Status: COMPLETED | OUTPATIENT
Start: 2020-03-18 | End: 2020-03-18

## 2020-03-18 RX ADMIN — HEPARIN SODIUM 5000 UNITS: 5000 INJECTION INTRAVENOUS; SUBCUTANEOUS at 14:21

## 2020-03-18 RX ADMIN — SODIUM CHLORIDE: 9 INJECTION, SOLUTION INTRAVENOUS at 14:29

## 2020-03-18 RX ADMIN — LEVOFLOXACIN 250 MG: 5 INJECTION, SOLUTION INTRAVENOUS at 11:25

## 2020-03-18 RX ADMIN — SODIUM CHLORIDE: 9 INJECTION, SOLUTION INTRAVENOUS at 05:16

## 2020-03-18 RX ADMIN — POTASSIUM PHOSPHATE, MONOBASIC AND POTASSIUM PHOSPHATE, DIBASIC 10 MMOL: 224; 236 INJECTION, SOLUTION, CONCENTRATE INTRAVENOUS at 14:21

## 2020-03-18 RX ADMIN — Medication 10 ML: at 09:50

## 2020-03-18 RX ADMIN — POTASSIUM BICARBONATE 40 MEQ: 782 TABLET, EFFERVESCENT ORAL at 00:06

## 2020-03-18 RX ADMIN — SODIUM BICARBONATE: 84 INJECTION, SOLUTION INTRAVENOUS at 15:55

## 2020-03-18 RX ADMIN — Medication 10 ML: at 22:19

## 2020-03-18 RX ADMIN — ATORVASTATIN CALCIUM 80 MG: 80 TABLET, FILM COATED ORAL at 09:49

## 2020-03-18 RX ADMIN — CLOPIDOGREL BISULFATE 75 MG: 75 TABLET ORAL at 09:49

## 2020-03-18 RX ADMIN — POTASSIUM PHOSPHATE, MONOBASIC AND POTASSIUM PHOSPHATE, DIBASIC 20 MMOL: 224; 236 INJECTION, SOLUTION, CONCENTRATE INTRAVENOUS at 18:24

## 2020-03-18 RX ADMIN — POTASSIUM CHLORIDE 40 MEQ: 1500 TABLET, EXTENDED RELEASE ORAL at 15:57

## 2020-03-18 RX ADMIN — HEPARIN SODIUM 5000 UNITS: 5000 INJECTION INTRAVENOUS; SUBCUTANEOUS at 22:19

## 2020-03-18 RX ADMIN — ASPIRIN 325 MG ORAL TABLET 325 MG: 325 PILL ORAL at 09:49

## 2020-03-18 RX ADMIN — POTASSIUM CHLORIDE 20 MEQ: 1500 TABLET, EXTENDED RELEASE ORAL at 09:49

## 2020-03-18 ASSESSMENT — PAIN SCALES - GENERAL: PAINLEVEL_OUTOF10: 0

## 2020-03-18 NOTE — PROGRESS NOTES
Paged regarding K 3.2 with Q6H renal checks and 40meq K replacement already completed. New order for 40 meq Effer-K one time. See MAR. Paged cross cover NP regarding pt's BP 89/46 MAP 60. No new orders at this time. Will continue to monitor.

## 2020-03-18 NOTE — PROGRESS NOTES
surgical history that includes Percutaneous Transluminal Coronary Angio and Upper gastrointestinal endoscopy (11/18/14). Restrictions  Restrictions/Precautions  Restrictions/Precautions: General Precautions, Fall Risk, Up as Tolerated  Vision/Hearing  Vision: Impaired  Vision Exceptions: Wears glasses at all times  Hearing: Exceptions to [facility]  Hearing Exceptions: Hard of hearing/hearing concerns;Right hearing aid     Subjective  General  Chart Reviewed: Yes  Patient assessed for rehabilitation services?: Yes  Response To Previous Treatment: Not applicable  Family / Caregiver Present: No  Referring Practitioner: Jocy Gonzales MD  Referral Date : 03/17/20  Diagnosis: DENAE  Follows Commands: Impaired  Other (Comment): requires repetition due to [facility], inconsistently follows commands  General Comment  Comments: Pt agreeable to PT, RN cleared pt for therapy  Subjective  Subjective: Pt resting in bed upon entry, denies pain at rest.  Pain Screening  Patient Currently in Pain: Yes(pt with signs of pain with LLE movement, did not rate )  Vital Signs  Patient Currently in Pain: Yes(reported left hip pain with movement, did not rate, subsided with rest)  Pre Treatment Pain Screening  Intervention List: Patient able to continue with treatment    Orientation  Orientation  Overall Orientation Status: Impaired  Orientation Level: Oriented to time;Disoriented to place; Disoriented to situation;Oriented to person  Social/Functional History  Social/Functional History  Lives With: Alone  Type of Home: House  Home Access: Stairs to enter with rails  Entrance Stairs - Number of Steps: 2  Bathroom Shower/Tub: Tub/Shower unit  Bathroom Toilet: Standard  Bathroom Equipment: Shower chair, Grab bars in shower  ADL Assistance: [facility] [address] [facility] [address]: Needs assistance  Shopping: (pt's sister performs)  Homemaking Responsibilities: Yes  Ambulation Assistance: Independent(without device)  Transfer Assistance: Independent  Active : (03/18/20 1546)  AM-PAC Inpatient T-Scale Score : 33.86 (03/18/20 1546)  Mobility Inpatient CMS 0-100% Score: 72.57 (03/18/20 1546)  Mobility Inpatient CMS G-Code Modifier : CL (03/18/20 1546)          Goals  Short term goals  Time Frame for Short term goals: 3/25/20 unless noted  Short term goal 1: Pt will perform bed mobility with CGA by 3/24/20  Short term goal 2: Pt will perform transfers with Celia x 1  Short term goal 3: Pt will ambulate 15 ft with RW and Celia x 1-2 for safety  Patient Goals   Patient goals : \"to go home\"       Therapy Time   Individual Concurrent Group Co-treatment   Time In 8375         Time Out 1405         Minutes 48         Timed Code Treatment Minutes: 45 Minutes    If pt is discharged prior to next therapy session, this note will serve as discharge summary.     Sammie Pineda, PT

## 2020-03-18 NOTE — PROGRESS NOTES
Hospitalist Progress Note      PCP: Corrinne Moh, MD    Date of Admission: 3/17/2020    Chief Complaint: Patient was found down    Hospital Course: See H&P    Subjective:   Patient is up in bed, comfortable, not in distress. Denies any chest pain or shortness of breath. No new event overnight noted. Medications:  Reviewed    Infusion Medications    sodium chloride 100 mL/hr at 03/18/20 0516     Scheduled Medications    potassium chloride  20 mEq Oral BID    potassium phosphate IVPB  10 mmol Intravenous Once    potassium bicarb-citric acid  40 mEq Oral Once    aspirin  325 mg Oral Daily    atorvastatin  80 mg Oral Daily    azelastine  1 drop Both Eyes BID    clopidogrel  75 mg Oral Daily    sodium chloride flush  10 mL Intravenous 2 times per day    heparin (porcine)  5,000 Units Subcutaneous 3 times per day    levofloxacin  250 mg Intravenous Q24H     PRN Meds: sodium chloride flush, acetaminophen **OR** acetaminophen, promethazine **OR** ondansetron      Intake/Output Summary (Last 24 hours) at 3/18/2020 1356  Last data filed at 3/18/2020 1329  Gross per 24 hour   Intake 2060 ml   Output 1250 ml   Net 810 ml       Physical Exam Performed:    BP (!) 92/46   Pulse 54   Temp 97.4 °F (36.3 °C) (Oral)   Resp 16   Wt 127 lb 10.3 oz (57.9 kg)   SpO2 99%   BMI 24.12 kg/m²     General appearance: No apparent distress, appears stated age and cooperative. HEENT: Pupils equal, round, and reactive to light. Conjunctivae/corneas clear. Neck: Supple, with full range of motion. No jugular venous distention. Trachea midline. Respiratory:  Normal respiratory effort. Clear to auscultation, bilaterally without Rales/Wheezes/Rhonchi. Cardiovascular: Regular rate and rhythm with normal S1/S2 without murmurs, rubs or gallops. Abdomen: Soft, non-tender, non-distended with normal bowel sounds. Musculoskeletal: No clubbing, cyanosis or edema bilaterally. Full range of motion without deformity.   Skin: Skin color, texture, turgor normal.  No rashes or lesions. Neurologic:  Neurovascularly intact without any focal sensory/motor deficits. Cranial nerves: II-XII intact, grossly non-focal.  Psychiatric: Alert and oriented, thought content appropriate, normal insight  Capillary Refill: Brisk,< 3 seconds   Peripheral Pulses: +2 palpable, equal bilaterally       Labs:   Recent Labs     03/17/20  1142 03/18/20  0249   WBC 8.2 5.9   HGB 9.9* 8.3*   HCT 27.9* 23.4*    102*     Recent Labs     03/17/20  2114 03/18/20  0249 03/18/20  0908   * 126* 129*   K 3.2* 3.8 2.8*   CL 89* 92* 94*   CO2 19* 21 19*   BUN 60* 55* 46*   CREATININE 1.6* 1.4* 1.1   CALCIUM 8.7 8.8 8.4   PHOS 3.1 2.1* 1.9*     Recent Labs     03/17/20  1142   AST 77*   ALT 27   BILITOT 0.7   ALKPHOS 104     Recent Labs     03/17/20  1142   INR 1.02     Recent Labs     03/17/20  1142 03/18/20  0249   CKTOTAL 1,213* 1,741*   TROPONINI 0.09* 0.04*       Urinalysis:      Lab Results   Component Value Date    NITRU Negative 03/17/2020    WBCUA see below 03/17/2020    BACTERIA 3+ 03/17/2020    RBCUA 3-4 03/17/2020    BLOODU LARGE 03/17/2020    SPECGRAV 1.025 03/17/2020    GLUCOSEU Negative 03/17/2020       Radiology:  CT Head WO Contrast   Final Result   Somewhat limited by motion artifact. No intracranial bleed. Contusion along the posterior right skull. CT Cervical Spine WO Contrast   Final Result   No acute abnormality of the cervical spine. XR KNEE RIGHT (1-2 VIEWS)   Final Result   No acute finding of the bilateral elbows, bilateral knees or left   tibia/fibula. XR KNEE LEFT (1-2 VIEWS)   Final Result   No acute finding of the bilateral elbows, bilateral knees or left   tibia/fibula. XR TIBIA FIBULA LEFT (2 VIEWS)   Final Result   No acute finding of the bilateral elbows, bilateral knees or left   tibia/fibula.          XR ELBOW RIGHT (MIN 3 VIEWS)   Final Result   No acute finding of the bilateral elbows, bilateral knees or left   tibia/fibula. XR ELBOW LEFT (MIN 3 VIEWS)   Final Result   No acute finding of the bilateral elbows, bilateral knees or left   tibia/fibula. XR CHEST PORTABLE   Final Result   No significant findings or interval change. Assessment/Plan:    Active Hospital Problems    Diagnosis    DENAE (acute kidney injury) ([facility] [address] [N17.9]     Rhabdomyolysis 2/2 fall  - CK >1000 on admission  - started on IVF  - continue to trend CK levels  - no evidence of trauma on imaging     DENAE  - 2/2 rhabdo vs prerenal  - started on IVF  - continue to monitor closely  Gradually improving with IV hydration, nephrology consulted.     UTI  - no evidence of sepsis  - noted on UA. Urine culture pending  - started on levaquin due to allergies  - continue IVF     Acute metabolic encephalopathy  - likely due to DENAE, UTI  - continue supportive treatment     Elevated troponin with known CAD  - mild. No chest pain reported  - likely 2/2 DENAE and rhabdo. Low concern for ACS  - continue to trend trops for now  - continue home ASA, plavix, statin. Holding BB due to hypotension     Bradycardia  - unclear etiology. Appears to be asymptomatic at present  - holding home BB  Continue to monitor on telemetry.     Hyponatremia  - likely hypovolemic  - started on IVF  - continue to monitor.  Goal level around 129 in 24 hours     Hypokalemia  - monitor and replete     HTN  - hypotensive on admission  - holding home BP meds     HLD  - continue home statin    DVT Prophylaxis: Subcu heparin  Diet: DIET CARDIAC;  Code Status: Full Code    PT/OT Eval Status: Not ordered    Dispo -2 to 4 days    Mainor Chew MD

## 2020-03-18 NOTE — PROGRESS NOTES
[address] [place] [address] or Facility: [facility] From: Jose Lema RE: Lester Barkley 1945 RM: [phone] her phosphorus level is 1.9, thank you Need Callback: NO CALLBACK REQ C4 PROGRESSIVE CARE

## 2020-03-18 NOTE — PROGRESS NOTES
Occupational Therapy   Occupational Therapy Initial Assessment and Treatment Note  Date: 3/18/2020   Patient Name: Mickie Galo  MRN: [id number]     : 1945    Date of Service: 3/18/2020    Discharge Recommendations:  [address]  OT Equipment Recommendations  Equipment Needed: No  Other: defer to next level of care    Assessment   Performance deficits / Impairments: Decreased functional mobility ; Decreased balance;Decreased safe awareness;Decreased ADL status; Decreased cognition;Decreased endurance;Decreased strength;Decreased high-level IADLs  Assessment: Pt reports typically independent with ADL/IADL, functional mobility/transfers, but is a questionable historian at time of eval. Pt required assist of two people for OOB mobility, with poor safety awareness throughout session. Anticipate pt will progress toward established goals, but would benefit from further therapy to increase endurance, safey/independence in these areas prior to returning home alone. Continue OT tx. Prognosis: Good;Fair  Decision Making: Medium Complexity  OT Education: OT Role;ADL Adaptive Strategies;Transfer Training;Plan of Care;Energy Conservation;Precautions; Equipment;Orientation  Patient Education: importance of mobility   Barriers to Learning: cognition  REQUIRES OT FOLLOW UP: Yes  Activity Tolerance  Activity Tolerance: Treatment limited secondary to decreased cognition;Patient limited by fatigue  Activity Tolerance: Pt BP 99/45, HR 61; Spo2 99% on RA following bed mobility  Safety Devices  Safety Devices in place: Yes  Type of devices: Left in chair;Chair alarm in place;Call light within reach;Gait belt;Nurse notified           Patient Diagnosis(es): The primary encounter diagnosis was DENAE (acute kidney injury) ([facility] [address]  Diagnoses of Non-traumatic rhabdomyolysis, Hypokalemia, Hyponatremia, Elevated troponin, Elevated lactic acid level, Multiple falls, Contusion, multiple sites, and Acute cystitis without hematuria were also pertinent to this visit. has a past medical history of Acute myocardial infarction, unspecified site, episode of care unspecified, Anxiety state, unspecified, Coronary atherosclerosis of unspecified type of vessel, native or graft, Essential hypertension, benign, Gastritis, Osteopenia, Other and unspecified hyperlipidemia, Screening mammogram, Severe Osteopenia, and Unspecified hearing loss. has a past surgical history that includes Percutaneous Transluminal Coronary Angio and Upper gastrointestinal endoscopy (11/18/14).            Restrictions  Restrictions/Precautions  Restrictions/Precautions: General Precautions, Fall Risk, Up as Tolerated    Subjective   General  Chart Reviewed: Yes  Patient assessed for rehabilitation services?: Yes  Family / Caregiver Present: No  Referring Practitioner: Jewel Kilgore MD  Diagnosis: Rhabdomyolysis 2/2 fall, DENAE, UTI, Acute metabolic encephalopathy, Elevated troponin  Subjective  Subjective: Pt in bed on arrival, pleasantly confused, not resistant to therapy  General Comment  Comments: Per RN okay to treat  Patient Currently in Pain: Yes(pt with signs of pain with LLE movement, did not rate )  Vital Signs  Patient Currently in Pain: Yes(pt with signs of pain with LLE movement, did not rate )  Social/Functional History  Social/Functional History  Lives With: Alone  Type of Home: House  Home Access: Stairs to enter with rails  Entrance Stairs - Number of Steps: 2  Bathroom Shower/Tub: Tub/Shower unit  Bathroom Toilet: Standard  Bathroom Equipment: Shower chair, Grab bars in shower  ADL Assistance: Independent  Homemaking Assistance: Needs assistance  Shopping: (pt's sister performs)  Homemaking Responsibilities: Yes  Ambulation Assistance: Independent(without device)  Transfer Assistance: Independent  Active : No  Patient's  Info: Sister  Occupation: Retired  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 Pt is a questionable historian. Objective   Vision: Impaired  Vision Exceptions: (wears glasses most of the time)  Hearing: Exceptions to [facility]  Hearing Exceptions: Hard of hearing/hearing concerns;Right hearing aid    Orientation  Overall Orientation Status: Impaired  Orientation Level: Disoriented to place;Oriented to time;Oriented to person;Disoriented to situation     Balance  Sitting Balance: Stand by assistance  Standing Balance: Dependent/Total(Mod-Max A x2, posterior lean)  Standing Balance  Time: 1-2 minutes, <1 minute  Activity: mobility, transfers  Functional Mobility  Functional - Mobility Device: Other(HHA, difficulty following commands for safe use of walker)  Activity: Other(bed <> chair)  Assist Level: Dependent/Total(mod-max A x2)  ADL  Feeding: Dentures; Modified independent ; Increased time to complete  Grooming: Setup;Minimal assistance;Verbal cueing; Increased time to complete  LE Dressing: Dependent/Total(for socks)  Toileting: Dependent/Total(purewick on arrival, incontinent of bowel, requiring total assist for clean-up, clothing management, hygiene)  Tone RUE  RUE Tone: Normotonic  Tone LUE  LUE Tone: Normotonic     Bed mobility  Rolling to Left: Minimal assistance  Rolling to Right: Minimal assistance  Supine to Sit: Minimal assistance(HOB elevated, cues for sequencing, increased time to complete)  Sit to Supine: Unable to assess(Pt left up in chair for lunch)  Scooting: Minimal assistance  Transfers  Sit to stand: Dependent/Total(Mod-Max A x2)  Stand to sit: Dependent/Total(Mod-Max A x2)     Cognition  Overall Cognitive Status: Exceptions  Arousal/Alertness: Delayed responses to stimuli  Following Commands: Follows one step commands with increased time; Follows one step commands with repetition(pt is very [place])  Attention Span: Attends with cues to redirect; Difficulty dividing attention  Memory: Decreased recall of recent events;Decreased short term memory;Decreased recall of biographical Information  Safety Judgement: Decreased awareness of need for assistance;Decreased awareness of need for safety  Problem Solving: Assistance required to generate solutions;Assistance required to implement solutions  Insights: Decreased awareness of deficits  Initiation: Requires cues for all  Sequencing: Requires cues for some        Sensation  Overall Sensation Status: WFL      LUE AROM (degrees)  LUE AROM : WFL  Left Hand AROM (degrees)  Left Hand AROM: WFL  RUE AROM (degrees)  RUE AROM : WFL  Right Hand AROM (degrees)  Right Hand AROM: WFL             Plan   Plan  Times per week: 3-5x/week   Current Treatment Recommendations: Strengthening, Balance Training, Functional Mobility Training, Endurance Training, Patient/Caregiver Education & Training, Equipment Evaluation, Education, & procurement, Self-Care / ADL, Cognitive Reorientation, Positioning, Home Management Training, Safety Education & Training, Pain Management, Gait Training      AM-PAC Score        AM-[facility] Inpatient Daily Activity Raw Score: 10 (03/18/20 1546)  AM-PAC Inpatient ADL T-Scale Score : 27.31 (03/18/20 1546)  ADL Inpatient CMS 0-100% Score: 74.7 (03/18/20 1546)  ADL Inpatient CMS G-Code Modifier : CL (03/18/20 1546)    Goals  Short term goals  Time Frame for Short term goals: 1 week (by 3/25/20)  Short term goal 1: Pt will complete functional transfers with Min A x1 and LRAD  Short term goal 2: Pt will complete LE dressing with Min A  Short term goal 3: Pt will perform 2-3 minutes dynamic standing with Mod A by 3/22  Patient Goals   Patient goals : \"to go home\"       Therapy Time   Individual Concurrent Group Co-treatment   Time In 1317(10 minutes for eval)         Time Out 1405         Minutes 48         Timed Code Treatment Minutes: 38 Minutes     This note to serve as d/c summary should pt d/c prior

## 2020-03-18 NOTE — FLOWSHEET NOTE
03/17/20 2056   Assessment   Charting Type Shift assessment   Neurological   Neuro (WDL) X   Level of Consciousness 0   Orientation Level Oriented to place;Oriented to time;Oriented to person;Disoriented to situation   Cognition Follows commands   Language Delayed responses;Clear   RUE Motor Response Responds to command;Normal extension;Normal flexion   Sensation RUE Full sensation   LUE Motor Response Responds to command;Normal extension;Normal flexion   Sensation LUE Full sensation   RLE Motor Response Responds to command;Normal extension;Normal flexion   Sensation RLE Full sensation   LLE Motor Response Responds to command;Normal extension;Normal flexion   Sensation LLE Full sensation   [place] Coma Scale   Eye Opening 4   Best Verbal Response 5   Best Motor Response 6   Jean-Paul Coma Scale Score 15   HEENT   HEENT (WDL) X   Right Ear Impaired hearing   Left Ear Impaired hearing   Teeth Dentures upper;Dentures lower   Respiratory   Respiratory (WDL) WDL   Respiratory Pattern Regular   Respiratory Depth Normal   Respiratory Quality/Effort Unlabored   Chest Assessment Chest expansion symmetrical   L Breath Sounds Clear   R Breath Sounds Clear   Cardiac   Cardiac (WDL) X   Cardiac Regularity Regular   Heart Sounds S1, S2   Cardiac Rhythm SB   Cardiac Monitor   Telemetry Monitor On Yes   Telemetry Box Number 6   Gastrointestinal   Abdominal (WDL) WDL   Peripheral Vascular   Peripheral Vascular (WDL) WDL   Edema None   Skin Color/Condition   Skin Color/Condition (WDL) X   Skin Color Ecchymosis; Pale   Skin Condition/Temp Dry;Poor turgor; Warm   Skin Integrity   Skin Integrity (WDL) X   Skin Integrity Bruising;Tear   Location BUE   Multiple Skin Integrity Sites Yes   Skin Integrity Site 2   Skin Integrity Location 2 Bruising   Location 2 BLE, R eye   Skin Integrity Site 3   Skin Integrity Location 3 Abrasion    Location 3 R index finger   Musculoskeletal   Musculoskeletal (WDL) X  (generalized weakness)   Genitourinary Genitourinary (WDL) X  (purewick)   Urine Assessment   Incontinence Yes   Anus/Rectum   Anus/Rectum (WDL) WDL   Psychosocial   Psychosocial (WDL) WDL

## 2020-03-18 NOTE — PROGRESS NOTES
Notified Dr. Elliott [facility] office, [facility], @ [phone] 3/18/20 re: nephrology consult. -[phone] [facility]

## 2020-03-19 LAB
ANION GAP SERPL CALCULATED.3IONS-SCNC: 12 MMOL/L (ref 3–16)
BASOPHILS ABSOLUTE: 0 K/UL (ref 0–0.2)
BASOPHILS RELATIVE PERCENT: 0 %
BUN BLDV-MCNC: 32 MG/DL (ref 7–20)
CALCIUM SERPL-MCNC: 8.1 MG/DL (ref 8.3–10.6)
CHLORIDE BLD-SCNC: 101 MMOL/L (ref 99–110)
CO2: 25 MMOL/L (ref 21–32)
CREAT SERPL-MCNC: 0.8 MG/DL (ref 0.6–1.2)
EOSINOPHILS ABSOLUTE: 0 K/UL (ref 0–0.6)
EOSINOPHILS RELATIVE PERCENT: 0 %
FOLATE: 4.63 NG/ML (ref 4.78–24.2)
GFR AFRICAN AMERICAN: >60
GFR NON-AFRICAN AMERICAN: >60
GLUCOSE BLD-MCNC: 104 MG/DL (ref 70–99)
HCT VFR BLD CALC: 21.1 % (ref 36–48)
HEMOGLOBIN: 7.6 G/DL (ref 12–16)
LYMPHOCYTES ABSOLUTE: 0.8 K/UL (ref 1–5.1)
LYMPHOCYTES RELATIVE PERCENT: 15.8 %
MCH RBC QN AUTO: 28.2 PG (ref 26–34)
MCHC RBC AUTO-ENTMCNC: 36.2 G/DL (ref 31–36)
MCV RBC AUTO: 77.9 FL (ref 80–100)
MONOCYTES ABSOLUTE: 0.4 K/UL (ref 0–1.3)
MONOCYTES RELATIVE PERCENT: 8.1 %
NEUTROPHILS ABSOLUTE: 3.9 K/UL (ref 1.7–7.7)
NEUTROPHILS RELATIVE PERCENT: 76.1 %
ORGANISM: ABNORMAL
PDW BLD-RTO: 22.8 % (ref 12.4–15.4)
PHOSPHORUS: 1.6 MG/DL (ref 2.5–4.9)
PLATELET # BLD: 105 K/UL (ref 135–450)
PMV BLD AUTO: 8.2 FL (ref 5–10.5)
POTASSIUM SERPL-SCNC: 3.2 MMOL/L (ref 3.5–5.1)
RBC # BLD: 2.71 M/UL (ref 4–5.2)
SODIUM BLD-SCNC: 138 MMOL/L (ref 136–145)
TOTAL CK: 1000 U/L (ref 26–192)
URINE CULTURE, ROUTINE: ABNORMAL
VITAMIN B-12: 1727 PG/ML (ref 211–911)
WBC # BLD: 5.1 K/UL (ref 4–11)

## 2020-03-19 PROCEDURE — 2500000003 HC RX 250 WO HCPCS: Performed by: INTERNAL MEDICINE

## 2020-03-19 PROCEDURE — 82550 ASSAY OF CK (CPK): CPT

## 2020-03-19 PROCEDURE — 82746 ASSAY OF FOLIC ACID SERUM: CPT

## 2020-03-19 PROCEDURE — 80048 BASIC METABOLIC PNL TOTAL CA: CPT

## 2020-03-19 PROCEDURE — 2060000000 HC ICU INTERMEDIATE R&B

## 2020-03-19 PROCEDURE — 82607 VITAMIN B-12: CPT

## 2020-03-19 PROCEDURE — 97535 SELF CARE MNGMENT TRAINING: CPT

## 2020-03-19 PROCEDURE — 97110 THERAPEUTIC EXERCISES: CPT

## 2020-03-19 PROCEDURE — 36415 COLL VENOUS BLD VENIPUNCTURE: CPT

## 2020-03-19 PROCEDURE — 97116 GAIT TRAINING THERAPY: CPT

## 2020-03-19 PROCEDURE — 6360000002 HC RX W HCPCS: Performed by: INTERNAL MEDICINE

## 2020-03-19 PROCEDURE — 6370000000 HC RX 637 (ALT 250 FOR IP): Performed by: INTERNAL MEDICINE

## 2020-03-19 PROCEDURE — 2580000003 HC RX 258: Performed by: INTERNAL MEDICINE

## 2020-03-19 PROCEDURE — 85025 COMPLETE CBC W/AUTO DIFF WBC: CPT

## 2020-03-19 PROCEDURE — 97530 THERAPEUTIC ACTIVITIES: CPT

## 2020-03-19 PROCEDURE — 84100 ASSAY OF PHOSPHORUS: CPT

## 2020-03-19 RX ORDER — SODIUM CHLORIDE 9 MG/ML
INJECTION, SOLUTION INTRAVENOUS CONTINUOUS
Status: DISCONTINUED | OUTPATIENT
Start: 2020-03-19 | End: 2020-03-20

## 2020-03-19 RX ADMIN — POTASSIUM PHOSPHATE, MONOBASIC AND POTASSIUM PHOSPHATE, DIBASIC 30 MMOL: 224; 236 INJECTION, SOLUTION, CONCENTRATE INTRAVENOUS at 18:37

## 2020-03-19 RX ADMIN — ATORVASTATIN CALCIUM 80 MG: 80 TABLET, FILM COATED ORAL at 08:49

## 2020-03-19 RX ADMIN — HEPARIN SODIUM 5000 UNITS: 5000 INJECTION INTRAVENOUS; SUBCUTANEOUS at 05:07

## 2020-03-19 RX ADMIN — ASPIRIN 325 MG ORAL TABLET 325 MG: 325 PILL ORAL at 08:49

## 2020-03-19 RX ADMIN — SODIUM BICARBONATE: 84 INJECTION, SOLUTION INTRAVENOUS at 05:06

## 2020-03-19 RX ADMIN — Medication 10 ML: at 22:09

## 2020-03-19 RX ADMIN — HEPARIN SODIUM 5000 UNITS: 5000 INJECTION INTRAVENOUS; SUBCUTANEOUS at 22:09

## 2020-03-19 RX ADMIN — LEVOFLOXACIN 250 MG: 5 INJECTION, SOLUTION INTRAVENOUS at 11:33

## 2020-03-19 RX ADMIN — HEPARIN SODIUM 5000 UNITS: 5000 INJECTION INTRAVENOUS; SUBCUTANEOUS at 14:00

## 2020-03-19 RX ADMIN — SODIUM CHLORIDE: 9 INJECTION, SOLUTION INTRAVENOUS at 13:59

## 2020-03-19 RX ADMIN — CLOPIDOGREL BISULFATE 75 MG: 75 TABLET ORAL at 08:49

## 2020-03-19 ASSESSMENT — PAIN SCALES - GENERAL
PAINLEVEL_OUTOF10: 0

## 2020-03-19 NOTE — PROGRESS NOTES
Occupational Therapy  Facility/Department: [facility] C4 PCU  Daily Treatment Note  NAME: Hermelinda Reyes  : 1945  MRN: [id number]    Date of Service: 3/19/2020    Discharge Recommendations:  Subacute/Skilled Nursing Facility     Assessment   Performance deficits / Impairments: Decreased functional mobility ; Decreased balance;Decreased safe awareness;Decreased ADL status; Decreased cognition;Decreased endurance;Decreased strength;Decreased high-level IADLs  Assessment: Pt demonstrates impaired ADL skills, balance, mobility and safety awareness. Recommend SNF for skilled OT services. Cont OT. Prognosis: Good  OT Education: OT Role;ADL Adaptive Strategies;Transfer Training;Plan of Care;Orientation  REQUIRES OT FOLLOW UP: Yes  Activity Tolerance  Activity Tolerance: Patient Tolerated treatment well  Safety Devices  Type of devices: Nurse notified; Bed alarm in place;Call light within reach;Gait belt;Left in bed       Patient Diagnosis(es): The primary encounter diagnosis was DENAE (acute kidney injury) ([facility] [address] Diagnoses of Non-traumatic rhabdomyolysis, Hypokalemia, Hyponatremia, Elevated troponin, Elevated lactic acid level, Multiple falls, Contusion, multiple sites, and Acute cystitis without hematuria were also pertinent to this visit. has a past medical history of Acute myocardial infarction, unspecified site, episode of care unspecified, Anxiety state, unspecified, Coronary atherosclerosis of unspecified type of vessel, native or graft, Essential hypertension, benign, Gastritis, Osteopenia, Other and unspecified hyperlipidemia, Screening mammogram, Severe Osteopenia, and Unspecified hearing loss. has a past surgical history that includes Percutaneous Transluminal Coronary Angio and Upper gastrointestinal endoscopy (14).   Restrictions  Restrictions/Precautions  Restrictions/Precautions: General Precautions, Fall Risk, Up as Tolerated  Position Activity Restriction  Other position/activity restrictions: kameron  Subjective   General  Chart Reviewed: Yes  Patient assessed for rehabilitation services?: Yes  Family / Caregiver Present: No  Referring Practitioner: Vangie Lamas MD  Diagnosis: Rhabdomyolysis 2/2 fall, DENAE, UTI, Acute metabolic encephalopathy, Elevated troponin  Subjective  Subjective: Pt seated in chair at OT arrival.  General Comment  Comments: RN approved therapy   Vital Signs  Patient Currently in Pain: Denies   Orientation  Orientation  Orientation Level: Oriented to person;Oriented to place; Disoriented to time;Disoriented to situation  Objective    ADL  Feeding: Modified independent ; Increased time to complete  Grooming: Stand by assistance; Increased time to complete(wash face and comb hair while seated )  LE Dressing: Dependent/Total  Toileting: Dependent/Total        Balance  Sitting Balance: Supervision  Standing Balance: Maximum assistance  Standing Balance  Activity: chair to bed transfer with max x1; posterior lean upon standing  Comment: Pt instructed on safe transfer technique. Pt needs reinforcement of transfer skills. Bed mobility  Supine to Sit: Unable to assess  Sit to Supine: Moderate assistance  Transfers  Stand Pivot Transfers: Maximum assistance(HHA)  Sit to stand: Maximum assistance  Stand to sit: Maximum assistance         Cognition  Arousal/Alertness: Delayed responses to stimuli  Following Commands: Follows one step commands with increased time; Follows one step commands with repetition  Attention Span: Attends with cues to redirect; Difficulty dividing attention  Memory: Decreased recall of recent events;Decreased short term memory;Decreased recall of biographical Information  Safety Judgement: Decreased awareness of need for assistance;Decreased awareness of need for safety  Problem Solving: Assistance required to generate solutions;Assistance required to implement solutions  Insights: Decreased awareness of deficits  Initiation: Requires cues for some  Sequencing: Requires cues for some     Type of ROM/Therapeutic Exercise  Type of ROM/Therapeutic Exercise: AROM  Comment: seated  Exercises  Shoulder Elevation: 10x  Shoulder Flexion: 10x  Horizontal ABduction: 10x  Horizontal ADduction: 10x  Elbow Flexion: 10x  Elbow Extension: 10x  Grasp/Release: 10x       Plan   Plan  Times per week: 3-5x/week   Current Treatment Recommendations: Strengthening, Balance Training, Functional Mobility Training, Endurance Training, Patient/Caregiver Education & Training, Equipment Evaluation, Education, & procurement, Self-Care / ADL, Cognitive Reorientation, Positioning, Home Management Training, Safety Education & Training, Pain Management, Gait Training  AM-PAC Score   AM-PAC Inpatient Daily Activity Raw Score: 11 (03/19/20 1626)  AM-PAC Inpatient ADL T-Scale Score : 29.04 (03/19/20 1626)  ADL Inpatient CMS 0-100% Score: 70.42 (03/19/20 1626)  ADL Inpatient CMS G-Code Modifier : CL (03/19/20 1626)  Goals  Short term goals  Time Frame for Short term goals: 1 week (by 3/25/20)  Short term goal 1: Pt will complete functional transfers with Min A x1 and LRAD  Short term goal 2: Pt will complete LE dressing with Min A  Short term goal 3: Pt will perform 2-3 minutes dynamic standing with Mod A by 3/22  Patient Goals   Patient goals : \"to go home\"     Therapy Time   Individual Concurrent Group Co-treatment   Time In 1358         Time Out 1425         Minutes 27         Timed Code Treatment Minutes: 27 Minutes    If pt is discharged prior to next OT session, this note will serve as the discharge summary.     Pascale Li OTR/L
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
## 2020-03-19 NOTE — PROGRESS NOTES
XR ELBOW LEFT (MIN 3 VIEWS)   Final Result   No acute finding of the bilateral elbows, bilateral knees or left   tibia/fibula. XR CHEST PORTABLE   Final Result   No significant findings or interval change. Assessment/Plan:    Active Hospital Problems    Diagnosis    DENAE (acute kidney injury) ([facility] [address] [N17.9]     Rhabdomyolysis 2/2 fall  - CK >1000 on admission  - started on IVF  - continue to trend CK levels  - no evidence of trauma on imaging  Clinically improving gradually, continue IV hydration. Nontraumatic rhabdomyolysis     DENAE  - 2/2 rhabdo vs prerenal  - started on IVF  - continue to monitor closely  Gradually improving with IV hydration, nephrology consulted.     UTI  - no evidence of sepsis  - noted on UA. Urine culture pending  - started on levaquin due to allergies  - continue IVF     Acute metabolic encephalopathy  - likely due to DENAE, UTI  - continue supportive treatment     Elevated troponin with known CAD  - mild. No chest pain reported  - likely 2/2 DENAE and rhabdo. Low concern for ACS  - continue to trend trops for now  - continue home ASA, plavix, statin. Holding BB due to hypotension     Bradycardia  - unclear etiology. Appears to be asymptomatic at present  - holding home BB  Continue to monitor on telemetry.     Hyponatremia  - likely hypovolemic  - started on IVF  - continue to monitor.  Goal level around 129 in 24 hours     Hypokalemia  - monitor and replete     HTN  - hypotensive on admission  - holding home BP meds     HLD  - continue home statin    DVT Prophylaxis: Subcu heparin  Diet: DIET CARDIAC;  Code Status: Full Code    PT/OT Eval Status:  ordered    Dispo -2 to 4 days    Fanta Vincent MD

## 2020-03-19 NOTE — PROGRESS NOTES
Osteopenia, and Unspecified hearing loss. has a past surgical history that includes Percutaneous Transluminal Coronary Angio and Upper gastrointestinal endoscopy (11/18/14). Restrictions  Restrictions/Precautions  Restrictions/Precautions: General Precautions, Fall Risk, Up as Tolerated  Subjective   General  Chart Reviewed: Yes  Response To Previous Treatment: Patient with no complaints from previous session. Family / Caregiver Present: No  Referring Practitioner: Shena Mahajan MD  Subjective  Subjective: Pt resting in bed upon entry, denies pain, very confused today  General Comment  Comments: Pt agreeable to PT, RN cleared pt for therapy  Pain Screening  Patient Currently in Pain: Denies  Vital Signs  Patient Currently in Pain: Denies       Orientation  Orientation  Overall Orientation Status: Impaired  Orientation Level: Oriented to time;Disoriented to place; Disoriented to situation;Oriented to person       Objective   Bed mobility  Rolling to Left: Minimal assistance  Rolling to Right: Minimal assistance  Supine to Sit: Minimal assistance(HOB elevated)  Sit to Supine: Unable to assess(up in chair at EOS, RN aware)  Scooting: Moderate assistance(to EOB)  Transfers  Sit to Stand: Minimal Assistance  Stand to sit: Minimal Assistance  Bed to Chair: Moderate assistance(with RW)  Ambulation  Ambulation?: Yes  More Ambulation?: Yes  Ambulation 1  Surface: level tile  Device: Rolling Walker  Assistance:  Moderate assistance;Maximum assistance  Quality of Gait: significant posterior lean, very unsteady, mod to max A to keep from falling backward, able to correct backward lean briefly with max VC's but unable to sustain  Distance: 3 ft bed to chair  Ambulation 2  Surface - 2: level tile  Device 2: (HHA)  Assistance 2: Moderate assistance;Maximum assistance  Quality of Gait 2: continues with posterior lean but somewhat better without walker, still requiring mod to max A  Distance: 5 ft forward and backward x 2 Balance  Posture: Fair  Sitting - Static: Fair;+  Sitting - Dynamic: Fair;+  Standing - Static: Poor  Standing - Dynamic: Poor  Comments: Pt needing max A to remain standing after sit to stand transfer due to posterior lean, able to briefly correct with max VC's but only able to sustain for 2-3 seconds   Exercises  Quad Sets: x 10 B  Heelslides: x 10 B  Hip Flexion: x 10 B  Knee Long Arc Quad: x 10 B  Ankle Pumps: x 10 B  Comments: pt required VC's and tactile cue for performance of ther ex; sit<>stand performed 5 trials with Celia for transfer but max A to reamin standing due to posterior lean                          AM-PAC Score  AM-PAC Inpatient Mobility Raw Score : 12 (03/19/20 1502)  AM-PAC Inpatient T-Scale Score : 35.33 (03/19/20 1502)  Mobility Inpatient CMS 0-100% Score: 68.66 (03/19/20 1502)  Mobility Inpatient CMS G-Code Modifier : CL (03/19/20 1502)          Goals  Short term goals  Time Frame for Short term goals: 3/25/20 unless noted  Short term goal 1: Pt will perform bed mobility with CGA by 3/24/20  Short term goal 2: Pt will perform transfers with Celia x 1; Celia for sit<>stand 3/19  Short term goal 3: Pt will ambulate 15 ft with RW and Celia x 1-2 for safety  Patient Goals   Patient goals : \"to go home\"    Plan    Plan  Times per week: 3-5  Current Treatment Recommendations: Strengthening, ADL/Self-care Training, ROM, Balance Training, Functional Mobility Training, Endurance Training, Transfer Training  Safety Devices  Type of devices: All fall risk precautions in place, Left in chair, Call light within reach, Chair alarm in place, Nurse notified, Gait belt, Patient at risk for falls     Therapy Time   Individual Concurrent Group Co-treatment   Time In 1150         Time Out 1228         Minutes 38              If pt is discharged prior to next therapy session, this note will serve as discharge summary.     Charla Shields, PT

## 2020-03-19 NOTE — CONSULTS
Patient seen and examined, consult note dictated.     Assessment and Plan:     1- DENAE: Likely secondary to a pre-renal component with notable improvement after volume expansion. Her urinary output is well maintained and her serum creatinine is trending down nicely. - Continue maintenance IV fluids.  - Maintain systolic blood pressure > 120 mmHg. - Avoid NSAID's and all other nephrotoxic agents. 2- Hypokalemia/Hypophosphatemia: PRN replacement. 3- Hypotension: Discontinue all antihypertensive medications and continue volume expansion.   4- Anemia: Hemoglobin slowly trending down, will defer PRBC transfusion to primary team.

## 2020-03-19 NOTE — DISCHARGE INSTR - COC
Continuity of Care Form    Patient Name: Peter Cuadra   :  1945  MRN:  [id number]    Admit date:  3/17/2020  Discharge date:  3/30/20    Code Status Order: Full Code   Advance Directives:     Admitting Physician:  Javed Billy MD  PCP: Celio Church MD    Discharging Nurse: [facility] Unit/Room#: 4415/9326-19  Discharging Unit Phone Number: [phone]    Emergency Contact:   Extended Emergency Contact Information  Primary Emergency Contact: Rocky Santiago [address] Phone: [phone]  Mobile Phone: [phone]  Relation: Brother/Sister    Past Surgical History:  Past Surgical History:   Procedure Laterality Date    PTCA      UPPER GASTROINTESTINAL ENDOSCOPY  14    Dr. Elva Solomon       Immunization History: There is no immunization history on file for this patient.     Active Problems:  Patient Active Problem List   Diagnosis Code    Anxiety state F41.1    Essential hypertension, benign I10    Hearing loss H91.90    Vitamin D deficiency E55.9    Severe Osteopenia M85.80    Abdominal aortic aneurysm (HCC) I71.4    Hyperlipidemia E78.5    Old myocardial infarction I25.2    Peptic ulcer K27.9    Postsurgical percutaneous transluminal coronary angioplasty status Z98.61    Cataracts, bilateral H26.9    Vascular disease I99.9    DENAE (acute kidney injury) (HCC) N17.9       Isolation/Infection:   Isolation          No Isolation        Patient Infection Status     None to display          Nurse Assessment:  Last Vital Signs: BP (!) 114/59   Pulse 68   Temp 98.1 °F (36.7 °C) (Oral)   Resp 16   Wt 142 lb 3.2 oz (64.5 kg)   SpO2 96%   BMI 26.87 kg/m²     Last documented pain score (0-10 scale): Pain Level: 0  Last Weight:   Wt Readings from Last 1 Encounters:   20 142 lb 3.2 oz (64.5 kg)     Mental Status:  disoriented    IV Access:  - None    Nursing Mobility/ADLs:  Walking   Assisted  Transfer  Assisted  Bathing Assisted  Dressing  Assisted  Toileting  Assisted  Feeding  Assisted  Med Admin  Assisted  Med Delivery   whole    Wound Care Documentation and Therapy:        Elimination:  Continence:   · Bowel: Yes  · Bladder: Yes  Urinary Catheter: None   Colostomy/Ileostomy/Ileal Conduit: No       Date of Last BM: 3/30/2020    Intake/Output Summary (Last 24 hours) at 3/19/2020 1025  Last data filed at 3/19/2020 0513  Gross per 24 hour   Intake 3016 ml   Output 1150 ml   Net 1866 ml     I/O last 3 completed shifts: In: 3136 [P.O.:480; I.V.:2656]  Out: 1150 [Urine:1150]    Safety Concerns:     Sundowners [place] and At Risk for Falls, history of fall last 30 days     Impairments/Disabilities:      Hearing    Nutrition Therapy:  Current Nutrition Therapy:   - Oral Diet:  General    Routes of Feeding: Oral  Liquids: Thin Liquids  Daily Fluid Restriction: no  Last Modified Barium Swallow with Video (Video Swallowing Test): not done    Treatments at the Time of Hospital Discharge:   Respiratory Treatments: none  Oxygen Therapy:  is not on home oxygen therapy.   Ventilator:    - No ventilator support    Rehab Therapies: Physical Therapy and Occupational Therapy  Weight Bearing Status/Restrictions: No weight bearing restirctions  Other Medical Equipment (for information only, NOT a DME order):  walker  Other Treatments: none    Patient's personal belongings (please select all that are sent with patient):  None    RN SIGNATURE:  Electronically signed by Frank Joiner RN on 3/30/20 at 11:04 AM EDT    CASE MANAGEMENT/SOCIAL WORK SECTION    Inpatient Status Date: 3/17/20    Readmission Risk Assessment Score:  Readmission Risk              Risk of Unplanned Readmission:        17           Discharging to Facility/ Agency   · Name: [facility]   · Address:  · Phone: [phone]  · Fax: [phone]    Dialysis Facility (if applicable)   · Name:  · Address:  · Dialysis Schedule:  · Phone:  · Fax:    /

## 2020-03-19 NOTE — FLOWSHEET NOTE
Abrasion    Location 3 bilat elbows   Musculoskeletal   Musculoskeletal (WDL) X  (generalized weakness)   Genitourinary   Genitourinary (WDL) X  (purewick)   Urine Assessment   Incontinence Yes   Anus/Rectum   Anus/Rectum (WDL) WDL   Psychosocial   Psychosocial (WDL) WDL

## 2020-03-19 NOTE — PLAN OF CARE
manageable  Description: Patient's pain/discomfort is manageable  Outcome: Ongoing  Note: Pt denies any c/o pain at this time. PRN pain medications available and will be administered as needed per MD's order if VSS. Will notify MD if current regimen is not controlling pain. Problem: Skin Integrity/Risk  Goal: No skin breakdown during hospitalization  Outcome: Ongoing  Note: Patient turned and repositioned Q2 and PRN. Bruising and abrasions from at home fall dressed. Problem: Anxiety:  Goal: Level of anxiety will decrease  Description: Level of anxiety will decrease  Outcome: Ongoing  Note: Spoke with Dr Shu Bowden regarding patient's anxiety level. Patient is calm and cooperative at this time. Will continue to monitor.

## 2020-03-20 LAB
ANION GAP SERPL CALCULATED.3IONS-SCNC: 9 MMOL/L (ref 3–16)
BASOPHILS ABSOLUTE: 0 K/UL (ref 0–0.2)
BASOPHILS RELATIVE PERCENT: 0.2 %
BUN BLDV-MCNC: 21 MG/DL (ref 7–20)
CALCIUM SERPL-MCNC: 7.6 MG/DL (ref 8.3–10.6)
CHLORIDE BLD-SCNC: 106 MMOL/L (ref 99–110)
CO2: 24 MMOL/L (ref 21–32)
CREAT SERPL-MCNC: 0.6 MG/DL (ref 0.6–1.2)
EOSINOPHILS ABSOLUTE: 0 K/UL (ref 0–0.6)
EOSINOPHILS RELATIVE PERCENT: 0.1 %
GFR AFRICAN AMERICAN: >60
GFR NON-AFRICAN AMERICAN: >60
GLUCOSE BLD-MCNC: 97 MG/DL (ref 70–99)
HCT VFR BLD CALC: 20.8 % (ref 36–48)
HEMOGLOBIN: 7.2 G/DL (ref 12–16)
LYMPHOCYTES ABSOLUTE: 1 K/UL (ref 1–5.1)
LYMPHOCYTES RELATIVE PERCENT: 24 %
MCH RBC QN AUTO: 27.7 PG (ref 26–34)
MCHC RBC AUTO-ENTMCNC: 34.6 G/DL (ref 31–36)
MCV RBC AUTO: 80.1 FL (ref 80–100)
MONOCYTES ABSOLUTE: 0.3 K/UL (ref 0–1.3)
MONOCYTES RELATIVE PERCENT: 8.2 %
NEUTROPHILS ABSOLUTE: 2.8 K/UL (ref 1.7–7.7)
NEUTROPHILS RELATIVE PERCENT: 67.5 %
OCCULT BLOOD DIAGNOSTIC: ABNORMAL
PDW BLD-RTO: 23.4 % (ref 12.4–15.4)
PHOSPHORUS: 1.7 MG/DL (ref 2.5–4.9)
PLATELET # BLD: 102 K/UL (ref 135–450)
PMV BLD AUTO: 7.6 FL (ref 5–10.5)
POTASSIUM SERPL-SCNC: 3.7 MMOL/L (ref 3.5–5.1)
RBC # BLD: 2.6 M/UL (ref 4–5.2)
REASON FOR REJECTION: NORMAL
REJECTED TEST: NORMAL
SODIUM BLD-SCNC: 139 MMOL/L (ref 136–145)
WBC # BLD: 4.2 K/UL (ref 4–11)

## 2020-03-20 PROCEDURE — 2500000003 HC RX 250 WO HCPCS: Performed by: INTERNAL MEDICINE

## 2020-03-20 PROCEDURE — 97535 SELF CARE MNGMENT TRAINING: CPT

## 2020-03-20 PROCEDURE — 6370000000 HC RX 637 (ALT 250 FOR IP): Performed by: INTERNAL MEDICINE

## 2020-03-20 PROCEDURE — 80048 BASIC METABOLIC PNL TOTAL CA: CPT

## 2020-03-20 PROCEDURE — 85025 COMPLETE CBC W/AUTO DIFF WBC: CPT

## 2020-03-20 PROCEDURE — G0328 FECAL BLOOD SCRN IMMUNOASSAY: HCPCS

## 2020-03-20 PROCEDURE — 36415 COLL VENOUS BLD VENIPUNCTURE: CPT

## 2020-03-20 PROCEDURE — 97110 THERAPEUTIC EXERCISES: CPT

## 2020-03-20 PROCEDURE — 2060000000 HC ICU INTERMEDIATE R&B

## 2020-03-20 PROCEDURE — 97530 THERAPEUTIC ACTIVITIES: CPT

## 2020-03-20 PROCEDURE — 84100 ASSAY OF PHOSPHORUS: CPT

## 2020-03-20 PROCEDURE — 2580000003 HC RX 258: Performed by: INTERNAL MEDICINE

## 2020-03-20 PROCEDURE — 6360000002 HC RX W HCPCS: Performed by: INTERNAL MEDICINE

## 2020-03-20 RX ADMIN — LEVOFLOXACIN 250 MG: 5 INJECTION, SOLUTION INTRAVENOUS at 11:57

## 2020-03-20 RX ADMIN — DIBASIC SODIUM PHOSPHATE, MONOBASIC POTASSIUM PHOSPHATE AND MONOBASIC SODIUM PHOSPHATE 1 TABLET: 852; 155; 130 TABLET ORAL at 13:34

## 2020-03-20 RX ADMIN — Medication 10 ML: at 08:50

## 2020-03-20 RX ADMIN — HEPARIN SODIUM 5000 UNITS: 5000 INJECTION INTRAVENOUS; SUBCUTANEOUS at 13:52

## 2020-03-20 RX ADMIN — HEPARIN SODIUM 5000 UNITS: 5000 INJECTION INTRAVENOUS; SUBCUTANEOUS at 05:58

## 2020-03-20 RX ADMIN — DIBASIC SODIUM PHOSPHATE, MONOBASIC POTASSIUM PHOSPHATE AND MONOBASIC SODIUM PHOSPHATE 1 TABLET: 852; 155; 130 TABLET ORAL at 21:35

## 2020-03-20 RX ADMIN — ASPIRIN 325 MG ORAL TABLET 325 MG: 325 PILL ORAL at 08:50

## 2020-03-20 RX ADMIN — CLOPIDOGREL BISULFATE 75 MG: 75 TABLET ORAL at 08:50

## 2020-03-20 RX ADMIN — ATORVASTATIN CALCIUM 80 MG: 80 TABLET, FILM COATED ORAL at 08:50

## 2020-03-20 RX ADMIN — Medication 10 ML: at 21:36

## 2020-03-20 RX ADMIN — POTASSIUM PHOSPHATE, MONOBASIC AND POTASSIUM PHOSPHATE, DIBASIC 20 MMOL: 224; 236 INJECTION, SOLUTION, CONCENTRATE INTRAVENOUS at 13:34

## 2020-03-20 RX ADMIN — HEPARIN SODIUM 5000 UNITS: 5000 INJECTION INTRAVENOUS; SUBCUTANEOUS at 21:35

## 2020-03-20 ASSESSMENT — PAIN SCALES - GENERAL
PAINLEVEL_OUTOF10: 0

## 2020-03-20 NOTE — PLAN OF CARE
Problem: Respiratory  Goal: O2 Sat > 90%  Outcome: Met This Shift  Note: O2 sats greater than 92% on room air thus far this shift. Problem: Pain:  Goal: Patient's pain/discomfort is manageable  Description: Patient's pain/discomfort is manageable  Outcome: Met This Shift  Note: Pt denies any pain or discomfort thus far this shift. Problem: Falls - Risk of:  Goal: Will remain free from falls  Description: Will remain free from falls  Outcome: Ongoing  Note: Pt is free from falls thus far this shift. Bed in lowest position, side rails up x2, non skid footwear in place, fall id bracelet in place. Bed alarm in place and activated. Bedside table and call light within reach. Problem: Skin Integrity/Risk  Goal: No skin breakdown during hospitalization  Outcome: Ongoing     Problem: Musculor/Skeletal Functional Status  Goal: Highest potential functional level  Outcome: Ongoing  Note: Pt up from bed to chair this shift with therapy, mod/max assist with transfer; per therapy pt has not ambulated yet. Problem: Injury - Risk of, Physical Injury:  Goal: Will remain free from falls  Description: Will remain free from falls  Outcome: Ongoing  Note: Pt is free from falls thus far this shift. Bed in lowest position, side rails up x2, non skid footwear in place, fall id bracelet in place. Bed alarm in place and activated. Bedside table and call light within reach. Problem: Skin Integrity/Risk  Intervention: Positioning  Note: Pt being turned and repositioned with check and change every 2 hours, pure wick in place. Sacral heart applied to coccyx. BLE elevated off bed onto pillow while in bed with heels floated off pillow.

## 2020-03-20 NOTE — PROGRESS NOTES
/62   Pulse 70   Temp 98.3 °F (36.8 °C) (Oral)   Resp 14   Wt 147 lb 0.8 oz (66.7 kg)   SpO2 97%   BMI 27.78 kg/m²  on room air. Pt A&O x4, can answer orientation questions, with confusion. Pt denies pain, discomfort or shortness of breath. Lungs diminished. NSR on tele at this time. Pt turned and repositioned with check and change, purewick catheter in place. BLE elevated off bed onto pillow with heels floated off pillows. IVF's infusing at ordered rate 50mL/hr. Pt denies any other needs at this time. Bedside table and call light within reach. Bed alarm in place and activated. Will continue to monitor.   Audelia Moncada  3/20/2020

## 2020-03-20 NOTE — FLOWSHEET NOTE
03/19/20 2137   Assessment   Charting Type Shift assessment   Neurological   Neuro (WDL) X   Level of Consciousness 0   Orientation Level Oriented to place;Oriented to time;Oriented to person;Oriented to situation; Appropriate for developmental age   Cognition Follows commands; Short term memory loss   Language Clear;Delayed responses; Appropriate for developmental age   Size R Pupil (mm) 3   R Pupil Shape Round   Size L Pupil (mm) 3   L Pupil Shape Round   R Hand  Moderate   L Hand  Moderate   R Foot Dorsiflexion Moderate   L Foot Dorsiflexion Moderate   R Foot Plantar Flexion Moderate   L Foot Plantar Flexion Moderate   RUE Motor Response Responds to command;Normal extension;Normal flexion   Sensation RUE Full sensation   LUE Motor Response Responds to command;Normal extension;Normal flexion   Sensation LUE Full sensation   RLE Motor Response Responds to command;Normal extension;Normal flexion   Sensation RLE Full sensation   LLE Motor Response Responds to command;Normal extension;Normal flexion   Sensation LLE Full sensation   Tongue Deviation None   NIH/MNIHSS Stroke Scale Assessed No   Staples Coma Scale   Eye Opening 4   Best Verbal Response 5   Best Motor Response 6   Staples Coma Scale Score 15   HEENT   HEENT (WDL) X   Right Ear Impaired hearing   Left Ear Impaired hearing   Teeth Dentures upper;Dentures lower   Respiratory   Respiratory (WDL) WDL   Respiratory Pattern Regular   Respiratory Depth Normal   Respiratory Quality/Effort Unlabored   Chest Assessment Chest expansion symmetrical   L Breath Sounds Clear   R Breath Sounds Clear   Cardiac   Cardiac (WDL) X   Cardiac Regularity Regular   Heart Sounds S1, S2   Cardiac Rhythm NSR;SB   Rhythm Interpretation   Pulse 71   Cardiac Monitor   Telemetry Monitor On Yes   Telemetry Box Number 6   Telemetry Audible Yes   Telemetry Alarms Set Yes   Gastrointestinal   Abdominal (WDL) WDL   Peripheral Vascular   Peripheral Vascular (WDL) WDL   Edema None

## 2020-03-20 NOTE — PROGRESS NOTES
(11/18/14). Restrictions  Restrictions/Precautions  Restrictions/Precautions: General Precautions, Fall Risk, Up as Tolerated  Position Activity Restriction  Other position/activity restrictions: purewick  Subjective   General  Chart Reviewed: Yes  Patient assessed for rehabilitation services?: Yes  Family / Caregiver Present: No  Referring Practitioner: Kenneth Nolan MD  Diagnosis: Rhabdomyolysis 2/2 fall, DENAE, UTI, Acute metabolic encephalopathy, Elevated troponin  Subjective  Subjective: Pt resting in bed at OT arrival.   General Comment  Comments: RN approved therapy   Vital Signs  Patient Currently in Pain: Denies   Orientation  Orientation  Overall Orientation Status: Impaired  Orientation Level: Oriented to place;Oriented to person;Oriented to time;Disoriented to situation  Objective    ADL  Grooming: Stand by assistance; Increased time to complete(comb hair and wash face/hands while seated)  LE Dressing: Maximum assistance(change brief )  Toileting: Dependent/Total(purewick)     Balance  Sitting Balance: Supervision  Standing Balance: Moderate assistance(posterior lean initially)  Standing Balance  Time: x1 min  Activity: chair to bed transfer with mod x1; posterior lean upon standing  Comment: Pt instructed on safe transfer technique. Pt needs reinforcement of transfer skills. Bed mobility  Supine to Sit: Moderate assistance  Sit to Supine: Unable to assess  Transfers  Stand Pivot Transfers: Moderate assistance(HHA)  Sit to stand: Moderate assistance  Stand to sit: Moderate assistance      Cognition  Arousal/Alertness: Delayed responses to stimuli  Following Commands: Follows one step commands with increased time; Follows one step commands with repetition  Attention Span: Attends with cues to redirect; Difficulty dividing attention  Memory: Decreased recall of recent events;Decreased short term memory;Decreased recall of biographical Information  Safety Judgement: Decreased awareness of need for assistance;Decreased awareness of need for safety  Problem Solving: Assistance required to generate solutions;Assistance required to implement solutions  Insights: Decreased awareness of deficits  Initiation: Requires cues for some  Sequencing: Requires cues for some         Type of ROM/Therapeutic Exercise  Type of ROM/Therapeutic Exercise: AROM  Comment: seated  Exercises  Shoulder Elevation: 10x  Shoulder Flexion: 10x  Horizontal ABduction: 10x  Horizontal ADduction: 10x  Elbow Flexion: 10x  Elbow Extension: 10x  Grasp/Release: 10x       Plan   Plan  Times per week: 3-5x/week   Current Treatment Recommendations: Strengthening, Balance Training, Functional Mobility Training, Endurance Training, Patient/Caregiver Education & Training, Equipment Evaluation, Education, & procurement, Self-Care / ADL, Cognitive Reorientation, Positioning, Home Management Training, Safety Education & Training, Pain Management, Gait Training  AM-PAC Score   AM-PAC Inpatient Daily Activity Raw Score: 11 (03/19/20 1626)  AM-PAC Inpatient ADL T-Scale Score : 29.04 (03/19/20 1626)  ADL Inpatient CMS 0-100% Score: 70.42 (03/19/20 1626)  ADL Inpatient CMS G-Code Modifier : CL (03/19/20 1626)  Goals  Short term goals  Time Frame for Short term goals: 1 week (by 3/25/20)  Short term goal 1: Pt will complete functional transfers with Min A x1 and LRAD  Short term goal 2: Pt will complete LE dressing with Min A  Short term goal 3: Pt will perform 2-3 minutes dynamic standing with Mod A by 3/22  Patient Goals   Patient goals : \"to go home\"    If pt is discharged prior to next OT session, this note will serve as the discharge summary.   Therapy Time   Individual Concurrent Group Co-treatment   Time In 4827         Time Out 1200         Minutes 25         Timed Code Treatment Minutes: Õie 16 OTR/L

## 2020-03-20 NOTE — PROGRESS NOTES
Writer received call from lab, with panic less than 0.4, lab is going to come redraw phosphorus lab. Sandee Guardado  3/20/2020    Repeat phosphorus 1.7. Sandee Guardado  3/20/2020    1243 writer made Dr. Roseann Gaona aware of phosphorus 1.7. See order for phosphorus tablet and potassium phosphorus IVPB.    Sandee Guardado  3/20/2020

## 2020-03-20 NOTE — PROGRESS NOTES
Physical Therapy  Facility/Department: [facility] C4 PCU  Daily Treatment Note  NAME: Love Faust  : 1945  MRN: [id number]    Date of Service: 3/20/2020    Discharge Recommendations:  Subacute/Skilled Nursing Facility        Assessment   Body structures, Functions, Activity limitations: Decreased functional mobility ; Decreased cognition;Decreased posture;Decreased endurance;Decreased balance  Assessment: Pt demos transfers w/ mod/max Ax1 and was unable to ambulate d/t weakness and instability in standing. AM-PAC Mobility score of 8. Continue per POC. Treatment Diagnosis: decreased indep with mobilty  Prognosis: Good  Decision Making: Medium Complexity  Barriers to Learning: cognition, distractibility  REQUIRES PT FOLLOW UP: Yes  Activity Tolerance  Activity Tolerance: Patient limited by endurance; Patient limited by cognitive status     Patient Diagnosis(es): The primary encounter diagnosis was DENAE (acute kidney injury) ([facility] [address] Diagnoses of Non-traumatic rhabdomyolysis, Hypokalemia, Hyponatremia, Elevated troponin, Elevated lactic acid level, Multiple falls, Contusion, multiple sites, and Acute cystitis without hematuria were also pertinent to this visit. has a past medical history of Acute myocardial infarction, unspecified site, episode of care unspecified, Anxiety state, unspecified, Coronary atherosclerosis of unspecified type of vessel, native or graft, Essential hypertension, benign, Gastritis, Osteopenia, Other and unspecified hyperlipidemia, Screening mammogram, Severe Osteopenia, and Unspecified hearing loss. has a past surgical history that includes Percutaneous Transluminal Coronary Angio and Upper gastrointestinal endoscopy (14).     Restrictions  Restrictions/Precautions  Restrictions/Precautions: General Precautions, Fall Risk, Up as Tolerated  Position Activity Restriction  Other position/activity restrictions: kameron  Subjective   General  Chart Reviewed: Yes  Response To Previous will perform transfers with Celia x 1; Celia for sit<>stand 3/19 --3/20 mod/max x1. Short term goal 3: Pt will ambulate 15 ft with RW and Celia x 1-2 for safety --3/20 N/T  Patient Goals   Patient goals : \"to go home\"    Plan    Plan  Times per week: 3-5  Current Treatment Recommendations: Strengthening, ADL/Self-care Training, ROM, Balance Training, Functional Mobility Training, Endurance Training, Transfer Training  Safety Devices  Type of devices:  All fall risk precautions in place, Call light within reach, Nurse notified, Gait belt, Patient at risk for falls, Bed alarm in place, Left in bed     Therapy Time   Individual Concurrent Group Co-treatment   Time In 1300         Time Out 1330         Minutes 30         Timed Code Treatment Minutes: 1440 [facility]

## 2020-03-20 NOTE — PROGRESS NOTES
Department of Internal Medicine  Nephrology Progress Note        SUBJECTIVE:    We are following this patient for DENAE. The patient was seen and examined; she feels well today with no CP, SOB, nausea or vomiting. ROS: No fever or chills. Social: No family at bedside. Physical Exam:    VITALS:  /62   Pulse 70   Temp 98.3 °F (36.8 °C) (Oral)   Resp 14   Wt 147 lb 0.8 oz (66.7 kg)   SpO2 97%   BMI 27.78 kg/m²     General appearance: Seems comfortable, no acute distress. Neck: Trachea midline, thyroid normal.   Lungs:  Non labored breathing, CTA to anterior auscultation. Heart:  S1S2 normal, rub or gallop. No peripheral edema. Abdomen: Soft, non-tender, no organomegaly. Skin: No lesions or rashes, warm to touch. DATA:    CBC:   Lab Results   Component Value Date    WBC 4.2 03/20/2020    RBC 2.60 03/20/2020    HGB 7.2 03/20/2020    HCT 20.8 03/20/2020    MCV 80.1 03/20/2020    MCH 27.7 03/20/2020    MCHC 34.6 03/20/2020    RDW 23.4 03/20/2020     03/20/2020    MPV 7.6 03/20/2020     BMP:    Lab Results   Component Value Date     03/20/2020    K 3.7 03/20/2020    K 2.8 03/17/2020     03/20/2020    CO2 24 03/20/2020    BUN 21 03/20/2020    LABALBU 3.1 03/18/2020    CREATININE 0.6 03/20/2020    CALCIUM 7.6 03/20/2020    GFRAA >60 03/20/2020    GFRAA >60 07/12/2012    LABGLOM >60 03/20/2020    GLUCOSE 97 03/20/2020       IMPRESSION/RECOMMENDATIONS:      1- DENAE: Likely secondary to a pre-renal component with notable improvement after volume expansion. Her urinary output is well maintained and her serum creatinine is back to baseline. 2- Hypokalemia/Hypophosphatemia: S/p PRN replacement. 3- Hypotension: Blood pressure improved s/p volume expansion.   4- Anemia: Hemoglobin slowly trending down, will defer PRBC transfusion to primary team.

## 2020-03-21 LAB
ANION GAP SERPL CALCULATED.3IONS-SCNC: 10 MMOL/L (ref 3–16)
BASOPHILS ABSOLUTE: 0 K/UL (ref 0–0.2)
BASOPHILS RELATIVE PERCENT: 0.2 %
BLOOD CULTURE, ROUTINE: NORMAL
BUN BLDV-MCNC: 16 MG/DL (ref 7–20)
CALCIUM SERPL-MCNC: 8 MG/DL (ref 8.3–10.6)
CHLORIDE BLD-SCNC: 109 MMOL/L (ref 99–110)
CO2: 22 MMOL/L (ref 21–32)
CREAT SERPL-MCNC: <0.5 MG/DL (ref 0.6–1.2)
EOSINOPHILS ABSOLUTE: 0 K/UL (ref 0–0.6)
EOSINOPHILS RELATIVE PERCENT: 0 %
GFR AFRICAN AMERICAN: >60
GFR NON-AFRICAN AMERICAN: >60
GLUCOSE BLD-MCNC: 101 MG/DL (ref 70–99)
HCT VFR BLD CALC: 21 % (ref 36–48)
HEMOGLOBIN: 7.3 G/DL (ref 12–16)
LYMPHOCYTES ABSOLUTE: 1 K/UL (ref 1–5.1)
LYMPHOCYTES RELATIVE PERCENT: 19.8 %
MCH RBC QN AUTO: 28 PG (ref 26–34)
MCHC RBC AUTO-ENTMCNC: 34.8 G/DL (ref 31–36)
MCV RBC AUTO: 80.3 FL (ref 80–100)
MONOCYTES ABSOLUTE: 0.4 K/UL (ref 0–1.3)
MONOCYTES RELATIVE PERCENT: 7.5 %
NEUTROPHILS ABSOLUTE: 3.7 K/UL (ref 1.7–7.7)
NEUTROPHILS RELATIVE PERCENT: 72.5 %
PDW BLD-RTO: 24.3 % (ref 12.4–15.4)
PHOSPHORUS: 2.3 MG/DL (ref 2.5–4.9)
PLATELET # BLD: 106 K/UL (ref 135–450)
PMV BLD AUTO: 7.7 FL (ref 5–10.5)
POTASSIUM SERPL-SCNC: 3.8 MMOL/L (ref 3.5–5.1)
RBC # BLD: 2.62 M/UL (ref 4–5.2)
SODIUM BLD-SCNC: 141 MMOL/L (ref 136–145)
WBC # BLD: 5.2 K/UL (ref 4–11)

## 2020-03-21 PROCEDURE — 6370000000 HC RX 637 (ALT 250 FOR IP): Performed by: INTERNAL MEDICINE

## 2020-03-21 PROCEDURE — 84100 ASSAY OF PHOSPHORUS: CPT

## 2020-03-21 PROCEDURE — 36415 COLL VENOUS BLD VENIPUNCTURE: CPT

## 2020-03-21 PROCEDURE — 2580000003 HC RX 258: Performed by: INTERNAL MEDICINE

## 2020-03-21 PROCEDURE — 6360000002 HC RX W HCPCS: Performed by: INTERNAL MEDICINE

## 2020-03-21 PROCEDURE — 2060000000 HC ICU INTERMEDIATE R&B

## 2020-03-21 PROCEDURE — 85025 COMPLETE CBC W/AUTO DIFF WBC: CPT

## 2020-03-21 PROCEDURE — 80048 BASIC METABOLIC PNL TOTAL CA: CPT

## 2020-03-21 RX ORDER — PANTOPRAZOLE SODIUM 40 MG/1
40 TABLET, DELAYED RELEASE ORAL
Status: DISCONTINUED | OUTPATIENT
Start: 2020-03-21 | End: 2020-03-22

## 2020-03-21 RX ADMIN — PANTOPRAZOLE SODIUM 40 MG: 40 TABLET, DELAYED RELEASE ORAL at 10:20

## 2020-03-21 RX ADMIN — ASPIRIN 325 MG ORAL TABLET 325 MG: 325 PILL ORAL at 08:35

## 2020-03-21 RX ADMIN — HEPARIN SODIUM 5000 UNITS: 5000 INJECTION INTRAVENOUS; SUBCUTANEOUS at 13:34

## 2020-03-21 RX ADMIN — PANTOPRAZOLE SODIUM 40 MG: 40 TABLET, DELAYED RELEASE ORAL at 15:38

## 2020-03-21 RX ADMIN — LEVOFLOXACIN 250 MG: 5 INJECTION, SOLUTION INTRAVENOUS at 11:33

## 2020-03-21 RX ADMIN — CLOPIDOGREL BISULFATE 75 MG: 75 TABLET ORAL at 08:35

## 2020-03-21 RX ADMIN — HEPARIN SODIUM 5000 UNITS: 5000 INJECTION INTRAVENOUS; SUBCUTANEOUS at 06:15

## 2020-03-21 RX ADMIN — Medication 10 ML: at 21:16

## 2020-03-21 RX ADMIN — Medication 10 ML: at 09:00

## 2020-03-21 RX ADMIN — ATORVASTATIN CALCIUM 80 MG: 80 TABLET, FILM COATED ORAL at 08:35

## 2020-03-21 RX ADMIN — HEPARIN SODIUM 5000 UNITS: 5000 INJECTION INTRAVENOUS; SUBCUTANEOUS at 21:16

## 2020-03-21 ASSESSMENT — PAIN SCALES - GENERAL
PAINLEVEL_OUTOF10: 0
PAINLEVEL_OUTOF10: 0

## 2020-03-21 ASSESSMENT — ENCOUNTER SYMPTOMS
RESPIRATORY NEGATIVE: 1
ALLERGIC/IMMUNOLOGIC NEGATIVE: 1
GASTROINTESTINAL NEGATIVE: 1
EYES NEGATIVE: 1

## 2020-03-21 ASSESSMENT — PAIN SCALES - WONG BAKER: WONGBAKER_NUMERICALRESPONSE: 0

## 2020-03-21 NOTE — PROGRESS NOTES
[facility] or Facility: [facility] From: Shaye Amato RE: Jatin Nichole 1945 RM: 200 FYI: Occult stool sample resulted positive; Hgb 7.3 Hct 21.0 Platelet count 707 Need Callback: NO CALLBACK REQ C4 PROGRESSIVE CARE  Unread    ----------------------------------------------------------------------    MD made aware of positive occult stool, Hgb 7.3, Hct 21.0, Platelet count 320 via perfect serve

## 2020-03-21 NOTE — PLAN OF CARE
Problem: Falls - Risk of:  Goal: Will remain free from falls  Description: Will remain free from falls  3/21/2020 0025 by Sloane Stacy RN  Outcome: Ongoing  3/20/2020 1506 by Shamir Nichole RN  Outcome: Ongoing  Note: Pt is free from falls thus far this shift. Bed in lowest position, side rails up x2, non skid footwear in place, fall id bracelet in place. Bed alarm in place and activated. Bedside table and call light within reach. Problem: Cardiovascular  Goal: No DVT, peripheral vascular complications  Outcome: Ongoing     Problem: Cardiovascular  Goal: Hemodynamic stability  Outcome: Ongoing     Problem: Cardiovascular  Goal: Weight maintained or lost  Outcome: Ongoing     Problem: Respiratory  Goal: No pulmonary complications  Outcome: Ongoing     Problem: Respiratory  Goal: O2 Sat > 90%  3/21/2020 0025 by Sloane Stacy RN  Outcome: Ongoing  3/20/2020 1506 by Shamir Nichole RN  Outcome: Met This Shift  Note: O2 sats greater than 92% on room air thus far this shift.

## 2020-03-21 NOTE — PROGRESS NOTES
intact, grossly non-focal.  Psychiatric: Alert and oriented, thought content appropriate, normal insight  Capillary Refill: Brisk,< 3 seconds   Peripheral Pulses: +2 palpable, equal bilaterally       Labs:   Recent Labs     03/19/20  0431 03/20/20  0448 03/21/20  0428   WBC 5.1 4.2 5.2   HGB 7.6* 7.2* 7.3*   HCT 21.1* 20.8* 21.0*   * 102* 106*     Recent Labs     03/19/20  0431 03/19/20  0932 03/20/20  0448 03/20/20  1149 03/21/20  0428     --  139  --  141   K 3.2*  --  3.7  --  3.8     --  106  --  109   CO2 25  --  24  --  22   BUN 32*  --  21*  --  16   CREATININE 0.8  --  0.6  --  <0.5*   CALCIUM 8.1*  --  7.6*  --  8.0*   PHOS  --  1.6*  --  1.7* 2.3*     No results for input(s): AST, ALT, BILIDIR, BILITOT, ALKPHOS in the last 72 hours. No results for input(s): INR in the last 72 hours. Recent Labs     03/19/20  0932   CKTOTAL 1,000*       Urinalysis:      Lab Results   Component Value Date    NITRU Negative 03/17/2020    WBCUA see below 03/17/2020    BACTERIA 3+ 03/17/2020    RBCUA 3-4 03/17/2020    BLOODU LARGE 03/17/2020    SPECGRAV 1.025 03/17/2020    GLUCOSEU Negative 03/17/2020       Radiology:  CT Head WO Contrast   Final Result   Somewhat limited by motion artifact. No intracranial bleed. Contusion along the posterior right skull. CT Cervical Spine WO Contrast   Final Result   No acute abnormality of the cervical spine. XR KNEE RIGHT (1-2 VIEWS)   Final Result   No acute finding of the bilateral elbows, bilateral knees or left   tibia/fibula. XR KNEE LEFT (1-2 VIEWS)   Final Result   No acute finding of the bilateral elbows, bilateral knees or left   tibia/fibula. XR TIBIA FIBULA LEFT (2 VIEWS)   Final Result   No acute finding of the bilateral elbows, bilateral knees or left   tibia/fibula. XR ELBOW RIGHT (MIN 3 VIEWS)   Final Result   No acute finding of the bilateral elbows, bilateral knees or left   tibia/fibula.          XR ELBOW LEFT (MIN 3 VIEWS)   Final Result   No acute finding of the bilateral elbows, bilateral knees or left   tibia/fibula. XR CHEST PORTABLE   Final Result   No significant findings or interval change. Assessment/Plan:    Active Hospital Problems    Diagnosis    DENAE (acute kidney injury) ([facility] [address] [N17.9]     Rhabdomyolysis 2/2 fall  - CK >1000 on admission  - started on IVF  - continue to trend CK levels  - no evidence of trauma on imaging  Clinically improving gradually, continue IV hydration. Nontraumatic rhabdomyolysis     DENAE  - 2/2 rhabdo vs prerenal  - started on IVF  - continue to monitor closely  Gradually improving with IV hydration, nephrology consulted. Clinically resolved.     UTI  - no evidence of sepsis  - noted on UA. Urine culture pending  - started on levaquin due to allergies  - continue IVF  Clinically improving.     Acute metabolic encephalopathy  - likely due to DENAE, UTI  - continue supportive treatment    Anemia, unknown etiology, patient was found Hemoccult positive, no need for blood transfusion for now, started on PPI, GI consult.     Elevated troponin with known CAD  - mild. No chest pain reported  - likely 2/2 DENAE and rhabdo. Low concern for ACS  - continue to trend trops for now  - continue home ASA, plavix, statin. Holding BB due to hypotension     Bradycardia  - unclear etiology. Appears to be asymptomatic at present  - holding home BB  Continue to monitor on telemetry. Resolved.     Hyponatremia  - likely hypovolemic  - started on IVF  - continue to monitor.  Goal level around 129 in 24 hours     Hypokalemia  - monitor and replete     HTN  - hypotensive on admission  - holding home BP meds     HLD  - continue home statin    DVT Prophylaxis: Subcu heparin  Diet: DIET CARDIAC;  Code Status: Full Code    PT/OT Eval Status:  ordered    Dispo - 1 to 2 days    Priyank Bhatia MD

## 2020-03-21 NOTE — FLOWSHEET NOTE
03/20/20 1948   Assessment   Charting Type Shift assessment   Neurological   Neuro (WDL) X   Level of Consciousness 0   Orientation Level Oriented X4  (can answer orientation questions, with some confusion)   Cognition Follows commands;Poor attention/concentration; Short term memory loss   Language Clear;Delayed responses; Appropriate for developmental age   Size R Pupil (mm) 3   R Pupil Shape Round   R Pupil Reaction Brisk   Size L Pupil (mm) 3   L Pupil Shape Round   L Pupil Reaction Brisk   R Hand  Moderate   L Hand  Moderate   R Foot Dorsiflexion Moderate   L Foot Dorsiflexion Moderate   R Foot Plantar Flexion Moderate   L Foot Plantar Flexion Moderate   RUE Motor Response Responds to command;Normal extension;Normal flexion   Sensation RUE Full sensation; No numbness; No pain; No tingling   LUE Motor Response Responds to command;Normal extension;Normal flexion   Sensation LUE Full sensation; No numbness; No pain; No tingling   RLE Motor Response Responds to command;Normal extension;Normal flexion   Sensation RLE Full sensation; No numbness; No pain; No tingling   LLE Motor Response Responds to command;Normal extension;Normal flexion   Sensation LLE Full sensation; No numbness; No pain; No tingling   Tongue Deviation None   NIH/MNIHSS Stroke Scale Assessed No   Jean-[place] Coma Scale   Eye Opening 4   Best Verbal Response 5   Best Motor Response 6   [place] Coma Scale Score 15   Cranial Nerves   Eye Assessment No deviation;Tracks with eyes   Eye Opening Spontaneously   HEENT   HEENT (WDL) X   Right Ear Impaired hearing   Left Ear Impaired hearing   Teeth Dentures upper;Dentures lower   Respiratory   Respiratory (WDL) WDL   Respiratory Pattern Regular   Respiratory Depth Normal   Respiratory Quality/Effort Unlabored   Chest Assessment Trachea midline; Chest expansion symmetrical   L Breath Sounds Diminished   R Breath Sounds Diminished   Cardiac   Cardiac (WDL) X   Cardiac Regularity Regular   Heart Sounds S1, S2 Cardiac Rhythm NSR;SB   Rhythm Interpretation   Pulse 68   Cardiac Monitor   Telemetry Monitor On Yes   Telemetry Box Number 6   Telemetry Audible Yes   Telemetry Alarms Set Yes   Gastrointestinal   Abdominal (WDL) WDL   Last BM (including prior to admit) 03/20/20   Peripheral Vascular   Peripheral Vascular (WDL) WDL   Edema None   Skin Color/Condition   Skin Color/Condition (WDL) X   Skin Color Ecchymosis; Appropriate for ethnicity   Skin Condition/Temp Warm;Dry;Poor turgor   Skin Integrity   Skin Integrity (WDL) X   Skin Integrity Abrasion;Bruising  (Scabs)   Location Scattered; BUE; BUE; Right Eye   Multiple Skin Integrity Sites Yes   Skin Integrity Site 2   Skin Integrity Location 2 Redness; Other (Comment)  (calloused/cracked Bilateral Heels)   Location 2   (Bilateral Heels; Coccyx (blanchable))   Preventative Dressing Yes  (Sacral mepilex; heels elevated off bed)   Dressing Site Sacrum;Elbow   Date Applied 03/20/20   Assessed this shift? Yes   Skin Integrity Site 3   Skin Integrity Location 3 Abrasion;Tear    Location 3   (Bilateral Elbows; Scattered)   Preventative Dressing Yes  (Kerlix, Gauze, Mepitel)   Dressing Site Elbow  (Bilateral )   Date Applied 03/20/20   Assessed this shift?  Yes   Musculoskeletal   Musculoskeletal (WDL) X  (generalized weakness)   Genitourinary   Genitourinary (WDL) X  (purewick)   Flank Tenderness No   Suprapubic Tenderness No   Dysuria No   Urine Assessment   Incontinence Yes  (purewick in place)   Urine Color Yellow/straw   Urine Appearance Clear   Anus/Rectum   Anus/Rectum (WDL) WDL   Psychosocial   Psychosocial (WDL) WDL

## 2020-03-21 NOTE — CONSULTS
Gastroenterology Consult Note    Patient:   Peder Aase   :    1945   Facility:     [address]  [address] [address]   Referring/PCP: Nasreen Ramirez MD  Date:     3/21/2020  Consultant:   Olaf Spann DO    Subjective: This 69 y.o. female was admitted 3/17/2020 with a diagnosis of \"DENAE (acute kidney injury) ([facility] [address] [N17.9]  DENAE (acute kidney injury) ([facility] [address] [N17.9]\" and is seen in consultation regarding anemia    70 yo female With history of coronary artery disease On aspirin and Plavix who was admitted to the hospital after being found down at her home. She had significant trauma to her face and legs. She has a normocytic anemia and was noted to be guaiac positive on admission. Patient had a colonoscopy in  which was reportedly normal.Patient also had an upper endoscopy in  which showed ulcers. She does not report any active GI bleeding or melena. She was noted to have contusion along the posterior right skull but no intracranial bleeding. She was diagnosed with a UTI on admission and rhabdomyolysis.       Past Medical History:   Diagnosis Date    Acute myocardial infarction, unspecified site, episode of care unspecified     h/o    Anxiety state, unspecified     Coronary atherosclerosis of unspecified type of vessel, native or graft     Essential hypertension, benign     Gastritis 3/27/2015    Osteopenia 2015    Other and unspecified hyperlipidemia     Screening mammogram 2009    (Both)Benign    Severe Osteopenia 2015    Unspecified hearing loss      Past Surgical History:   Procedure Laterality Date    PTCA      UPPER GASTROINTESTINAL ENDOSCOPY  14    Dr. Maryjane Reyes       Social:   Social History     Tobacco Use    Smoking status: Former Smoker     Packs/day: 1.00     Years: 8.00     Pack years: 8.00     Types: Cigarettes     Last attempt to quit: 1997     Years since quittin.2    Smokeless tobacco: Never Used   Substance Use Topics    Alcohol use: Yes     Family:   Family History   Problem Relation Age of Onset    Heart Attack Father     Diabetes Mother     Heart Attack Mother        Scheduled Medications:    pantoprazole  40 mg Oral BID AC    aspirin  325 mg Oral Daily    atorvastatin  80 mg Oral Daily    azelastine  1 drop Both Eyes BID    clopidogrel  75 mg Oral Daily    sodium chloride flush  10 mL Intravenous 2 times per day    heparin (porcine)  5,000 Units Subcutaneous 3 times per day    levofloxacin  250 mg Intravenous Q24H     Infusions:   PRN Medications: sodium chloride flush, acetaminophen **OR** acetaminophen, promethazine **OR** ondansetron  Allergies: Allergies   Allergen Reactions    Alcohol     Cephalosporins     Vitamins A & D     Penicillins Rash       ROS:   Review of Systems   Constitutional: Negative. HENT: Negative. Eyes: Negative. Respiratory: Negative. Cardiovascular: Negative. Gastrointestinal: Negative. Endocrine: Negative. Genitourinary: Negative. Musculoskeletal: Negative. Skin: Negative. Allergic/Immunologic: Negative. Neurological: Negative. Hematological: Negative. Psychiatric/Behavioral: Negative. Objective:     Physical Exam:   Vitals:    03/21/20 1537   BP: 126/71   Pulse: 76   Resp: 18   Temp: 99.4 °F (37.4 °C)   SpO2: 97%     I/O last 3 completed shifts:   In: 46 [P.O.:390]  Out: 1253 [Urine:1250; Stool:3]   General appearance: alert, appears stated age and cooperative  HEENT: PERRLA  Neck: no adenopathy, no carotid bruit, no JVD, supple, symmetrical, trachea midline and thyroid not enlarged, symmetric, no tenderness/mass/nodules  Lungs: clear to auscultation bilaterally  Heart: regular rate and rhythm, S1, S2 normal, no murmur, click, rub or gallop  Abdomen: soft, non-tender; bowel sounds normal; no masses,  no organomegaly  Extremities: extremities normal, atraumatic, no cyanosis or edema     Lab and Imaging Review   Labs:  CBC:   Recent Labs     03/19/20  0431 03/20/20  0448 03/21/20  0428   WBC 5.1 4.2 5.2   HGB 7.6* 7.2* 7.3*   HCT 21.1* 20.8* 21.0*   MCV 77.9* 80.1 80.3   * 102* 106*     BMP:   Recent Labs     03/19/20  0431 03/19/20  0932 03/20/20  0448 03/20/20  1149 03/21/20  0428     --  139  --  141   K 3.2*  --  3.7  --  3.8     --  106  --  109   CO2 25  --  24  --  22   PHOS  --  1.6*  --  1.7* 2.3*   BUN 32*  --  21*  --  16   CREATININE 0.8  --  0.6  --  <0.5*     LIVER PROFILE: No results for input(s): AST, ALT, LIPASE, PROT, BILIDIR, BILITOT, ALKPHOS in the last 72 hours. Invalid input(s): AMYLASE,  ALB  PT/INR: No results for input(s): INR in the last 72 hours. Invalid input(s): PT    IMAGING:  No results found. Hospital Problems           Last Modified POA    DENAE (acute kidney injury) ([facility] [address] 3/17/2020 Yes        Assessment:   70 yo female with rhabdomyolysis, UTI, and history of PUD presents with significant anemia after being found down and elevated BUN/Cr ratio    Plan:   1. Some of patient's hemoglobin drop likely reflective of significant body hematomas however given active anticoagulation which is being held will want to rule out other potential sources of bleed and loss of consciousness prior to restarting.   Clear liquid diet tomorrow, will plan EGD and colonoscopy with anesthesia on Monday      Estefany Clemons DO  5:15 PM 3/21/2020            [facility]    [address]      [address] [place] [address]     Phone: [phone]     Fax: [phone]

## 2020-03-21 NOTE — PROGRESS NOTES
Notified Dr. Chelsea Felty answering service, Tesha Slater, @ [phone] 3/21/20 re: GI consult. [phone] [facility]

## 2020-03-21 NOTE — FLOWSHEET NOTE
symmetrical   Breath Sounds   Right Upper Lobe Diminished   Right Middle Lobe Diminished   Right Lower Lobe Diminished   Left Upper Lobe Diminished   Left Lower Lobe Diminished   Cardiac   Cardiac Regularity Regular   Heart Sounds S1, S2   Cardiac Rhythm NSR  (On tele monitor)   Cardiac Monitor   Telemetry Monitor On Yes   Telemetry Box Number 6   Telemetry Audible Yes   Telemetry Alarms Set Yes   Gastrointestinal   Abdominal (WDL) WDL   Peripheral Vascular   Peripheral Vascular (WDL) WDL   Edema None   Skin Color/Condition   Skin Color Appropriate for ethnicity; Ecchymosis   Skin Condition/Temp Dry;Poor turgor; Warm   Skin Integrity   Skin Integrity (WDL)   (Rt eye bruised)   Skin Integrity Abrasion;Bruising; Redness; Tear   Location scattered BUE, BLE   Multiple Skin Integrity Sites Yes   Skin Integrity Site 2   Skin Integrity Location 2 Redness   Location 2 Coccyx, buttocks   Preventative Dressing Yes   Dressing Site Sacrum   Date Applied 03/21/20   Skin Integrity Site 3   Skin Integrity Location 3 Abrasion;Bruising;Tear    Location 3 Mike. elbow   Preventative Dressing Yes   Dressing Site Elbow   Date Applied 03/20/20   Assessed this shift? Yes   Musculoskeletal   RUE Full movement   LUE Full movement   RL Extremity Weakness   LL Extremity Weakness   Genitourinary   Genitourinary (WDL)   (Pt has purewick)   Flank Tenderness No   Suprapubic Tenderness No   Dysuria No   Urine Assessment   Incontinence Yes   Anus/Rectum   Evaluation Hemorrhoids   Psychosocial   Patient Behaviors Calm; Cooperative   Pt alert and oriented to person, disoriented to time and day \" Monday \", unable to tell time and year. Pt has no c/o pain/discomfort. Had incontinent of stool ( Soft, moderate yellow) and urine. Pt updated with POC, agrees, needs re enforcement. Pt kept warm and comfortable in bed. Incontinent care provided. Safety/fall prevention maintianed. Personal belongings and call light within reach. Will continue to monitor.  MKRN

## 2020-03-21 NOTE — PROGRESS NOTES
Department of Internal Medicine  Nephrology Progress Note        SUBJECTIVE:    We are following this patient for DENAE. The patient was seen and examined; she feels well today with no CP, SOB, nausea or vomiting. ROS: No fever or chills. Social: No family at bedside. Physical Exam:    VITALS:  /65   Pulse 77   Temp 98 °F (36.7 °C) (Oral)   Resp 18   Wt 142 lb 6.7 oz (64.6 kg)   SpO2 98%   BMI 26.91 kg/m²     General appearance: Seems comfortable, no acute distress. Neck: Trachea midline, thyroid normal.   Lungs:  Non labored breathing, CTA to anterior auscultation. Heart:  S1S2 normal, rub or gallop. No peripheral edema. Abdomen: Soft, non-tender, no organomegaly. Skin: No lesions or rashes, warm to touch. DATA:    CBC:   Lab Results   Component Value Date    WBC 5.2 03/21/2020    RBC 2.62 03/21/2020    HGB 7.3 03/21/2020    HCT 21.0 03/21/2020    MCV 80.3 03/21/2020    MCH 28.0 03/21/2020    MCHC 34.8 03/21/2020    RDW 24.3 03/21/2020     03/21/2020    MPV 7.7 03/21/2020     BMP:    Lab Results   Component Value Date     03/21/2020    K 3.8 03/21/2020    K 2.8 03/17/2020     03/21/2020    CO2 22 03/21/2020    BUN 16 03/21/2020    LABALBU 3.1 03/18/2020    CREATININE <0.5 03/21/2020    CALCIUM 8.0 03/21/2020    GFRAA >60 03/21/2020    GFRAA >60 07/12/2012    LABGLOM >60 03/21/2020    GLUCOSE 101 03/21/2020       IMPRESSION/RECOMMENDATIONS:      1- DENAE: Likely secondary to a pre-renal component with notable improvement after volume expansion. Her urinary output is well maintained and her serum creatinine is back to baseline. 2- Hypophosphatemia: S/p PRN replacement and pending repeat laboratories. 3- Hypotension: Blood pressure improved s/p volume expansion.   4- Anemia: Hemoglobin slowly trending down, will defer PRBC transfusion to primary team.

## 2020-03-21 NOTE — PROGRESS NOTES
4 Eyes Skin Assessment     The patient is being assess for   Shift Handoff    I agree that 2 RN's have performed a thorough Head to Toe Skin Assessment on the patient. ALL assessment sites listed below have been assessed. Areas assessed by both nurses:   [x]   Head, Face, and Ears   [x]   Shoulders, Back, and Chest, Abdomen  [x]   Arms, Elbows, and Hands   [x]   Coccyx, Sacrum, and Ischium  [x]   Legs, Feet, and Heels        Scattered bruising and abrasions on all extremities bilaterally; dry, calloused, reddened heels (blanchable) Heels floated, pt refusing heel protectors overnight; skin tears and scabs on bilateral elbows. Mepitel, gauze and Kerlix in place; Large bruise over right eye. **SHARE this note so that the co-signing nurse is able to place an eSignature**    Co-signer eSignature: {Esignature:[id number]}    Does the Patient have Skin Breakdown?   No          [place] Prevention initiated:  Yes   Wound Care Orders initiated:  NA      [facility] nurse consulted for Pressure Injury (Stage 3,4, Unstageable, DTI, NWPT, Complex wounds)and New or Established Ostomies:  NA      Primary Nurse eSignature: Electronically signed by Jenae Schuster RN on 3/21/20 at 7:14 AM EDT

## 2020-03-22 LAB
ABO/RH: NORMAL
ANION GAP SERPL CALCULATED.3IONS-SCNC: 9 MMOL/L (ref 3–16)
ANTIBODY SCREEN: NORMAL
BASOPHILS ABSOLUTE: 0 K/UL (ref 0–0.2)
BASOPHILS RELATIVE PERCENT: 0.4 %
BUN BLDV-MCNC: 15 MG/DL (ref 7–20)
CALCIUM SERPL-MCNC: 8.2 MG/DL (ref 8.3–10.6)
CHLORIDE BLD-SCNC: 110 MMOL/L (ref 99–110)
CO2: 23 MMOL/L (ref 21–32)
CREAT SERPL-MCNC: <0.5 MG/DL (ref 0.6–1.2)
CULTURE, BLOOD 2: NORMAL
EOSINOPHILS ABSOLUTE: 0 K/UL (ref 0–0.6)
EOSINOPHILS RELATIVE PERCENT: 0.1 %
GFR AFRICAN AMERICAN: >60
GFR NON-AFRICAN AMERICAN: >60
GLUCOSE BLD-MCNC: 101 MG/DL (ref 70–99)
HCT VFR BLD CALC: 16.7 % (ref 36–48)
HCT VFR BLD CALC: 21.6 % (ref 36–48)
HEMOGLOBIN: 5.8 G/DL (ref 12–16)
HEMOGLOBIN: 7.4 G/DL (ref 12–16)
LYMPHOCYTES ABSOLUTE: 1 K/UL (ref 1–5.1)
LYMPHOCYTES RELATIVE PERCENT: 21.5 %
MCH RBC QN AUTO: 27.9 PG (ref 26–34)
MCHC RBC AUTO-ENTMCNC: 34.3 G/DL (ref 31–36)
MCV RBC AUTO: 81.4 FL (ref 80–100)
MONOCYTES ABSOLUTE: 0.4 K/UL (ref 0–1.3)
MONOCYTES RELATIVE PERCENT: 7.8 %
NEUTROPHILS ABSOLUTE: 3.4 K/UL (ref 1.7–7.7)
NEUTROPHILS RELATIVE PERCENT: 70.2 %
PDW BLD-RTO: 24.1 % (ref 12.4–15.4)
PLATELET # BLD: 122 K/UL (ref 135–450)
PMV BLD AUTO: 7.2 FL (ref 5–10.5)
POTASSIUM SERPL-SCNC: 3.4 MMOL/L (ref 3.5–5.1)
RBC # BLD: 2.66 M/UL (ref 4–5.2)
SODIUM BLD-SCNC: 142 MMOL/L (ref 136–145)
WBC # BLD: 4.8 K/UL (ref 4–11)

## 2020-03-22 PROCEDURE — C9113 INJ PANTOPRAZOLE SODIUM, VIA: HCPCS | Performed by: NURSE PRACTITIONER

## 2020-03-22 PROCEDURE — 36415 COLL VENOUS BLD VENIPUNCTURE: CPT

## 2020-03-22 PROCEDURE — 6370000000 HC RX 637 (ALT 250 FOR IP): Performed by: INTERNAL MEDICINE

## 2020-03-22 PROCEDURE — 86923 COMPATIBILITY TEST ELECTRIC: CPT

## 2020-03-22 PROCEDURE — 2580000003 HC RX 258: Performed by: INTERNAL MEDICINE

## 2020-03-22 PROCEDURE — 86900 BLOOD TYPING SEROLOGIC ABO: CPT

## 2020-03-22 PROCEDURE — 85025 COMPLETE CBC W/AUTO DIFF WBC: CPT

## 2020-03-22 PROCEDURE — 86850 RBC ANTIBODY SCREEN: CPT

## 2020-03-22 PROCEDURE — 6360000002 HC RX W HCPCS: Performed by: NURSE PRACTITIONER

## 2020-03-22 PROCEDURE — 86901 BLOOD TYPING SEROLOGIC RH(D): CPT

## 2020-03-22 PROCEDURE — 85018 HEMOGLOBIN: CPT

## 2020-03-22 PROCEDURE — 2060000000 HC ICU INTERMEDIATE R&B

## 2020-03-22 PROCEDURE — 80048 BASIC METABOLIC PNL TOTAL CA: CPT

## 2020-03-22 PROCEDURE — 6360000002 HC RX W HCPCS: Performed by: INTERNAL MEDICINE

## 2020-03-22 PROCEDURE — 36430 TRANSFUSION BLD/BLD COMPNT: CPT

## 2020-03-22 PROCEDURE — 2580000003 HC RX 258: Performed by: NURSE PRACTITIONER

## 2020-03-22 PROCEDURE — 85014 HEMATOCRIT: CPT

## 2020-03-22 PROCEDURE — P9016 RBC LEUKOCYTES REDUCED: HCPCS

## 2020-03-22 RX ORDER — POTASSIUM CHLORIDE 20 MEQ/1
20 TABLET, EXTENDED RELEASE ORAL ONCE
Status: COMPLETED | OUTPATIENT
Start: 2020-03-22 | End: 2020-03-22

## 2020-03-22 RX ORDER — PANTOPRAZOLE SODIUM 40 MG/10ML
40 INJECTION, POWDER, LYOPHILIZED, FOR SOLUTION INTRAVENOUS 2 TIMES DAILY
Status: DISCONTINUED | OUTPATIENT
Start: 2020-03-22 | End: 2020-03-26

## 2020-03-22 RX ORDER — POLYETHYLENE GLYCOL 3350 17 G/17G
238 POWDER, FOR SOLUTION ORAL ONCE
Status: DISCONTINUED | OUTPATIENT
Start: 2020-03-22 | End: 2020-03-30 | Stop reason: HOSPADM

## 2020-03-22 RX ORDER — 0.9 % SODIUM CHLORIDE 0.9 %
500 INTRAVENOUS SOLUTION INTRAVENOUS ONCE
Status: COMPLETED | OUTPATIENT
Start: 2020-03-22 | End: 2020-03-22

## 2020-03-22 RX ORDER — 0.9 % SODIUM CHLORIDE 0.9 %
20 INTRAVENOUS SOLUTION INTRAVENOUS ONCE
Status: COMPLETED | OUTPATIENT
Start: 2020-03-22 | End: 2020-03-23

## 2020-03-22 RX ADMIN — SODIUM CHLORIDE 500 ML: 9 INJECTION, SOLUTION INTRAVENOUS at 21:06

## 2020-03-22 RX ADMIN — POLYETHYLENE GLYCOL 3350, SODIUM SULFATE ANHYDROUS, SODIUM BICARBONATE, SODIUM CHLORIDE, POTASSIUM CHLORIDE 4000 ML: 236; 22.74; 6.74; 5.86; 2.97 POWDER, FOR SOLUTION ORAL at 15:36

## 2020-03-22 RX ADMIN — BISACODYL 10 MG: 5 TABLET, COATED ORAL at 23:56

## 2020-03-22 RX ADMIN — BISACODYL 10 MG: 5 TABLET, COATED ORAL at 14:47

## 2020-03-22 RX ADMIN — HEPARIN SODIUM 5000 UNITS: 5000 INJECTION INTRAVENOUS; SUBCUTANEOUS at 06:31

## 2020-03-22 RX ADMIN — ACETAMINOPHEN 650 MG: 325 TABLET ORAL at 11:24

## 2020-03-22 RX ADMIN — PANTOPRAZOLE SODIUM 40 MG: 40 TABLET, DELAYED RELEASE ORAL at 06:31

## 2020-03-22 RX ADMIN — ONDANSETRON 4 MG: 2 INJECTION INTRAMUSCULAR; INTRAVENOUS at 11:24

## 2020-03-22 RX ADMIN — CLOPIDOGREL BISULFATE 75 MG: 75 TABLET ORAL at 08:27

## 2020-03-22 RX ADMIN — Medication 10 ML: at 21:06

## 2020-03-22 RX ADMIN — LEVOFLOXACIN 250 MG: 5 INJECTION, SOLUTION INTRAVENOUS at 11:25

## 2020-03-22 RX ADMIN — POTASSIUM CHLORIDE 20 MEQ: 1500 TABLET, EXTENDED RELEASE ORAL at 11:24

## 2020-03-22 RX ADMIN — SODIUM CHLORIDE 20 ML: 900 INJECTION, SOLUTION INTRAVENOUS at 23:16

## 2020-03-22 RX ADMIN — Medication 10 ML: at 08:27

## 2020-03-22 RX ADMIN — PANTOPRAZOLE SODIUM 40 MG: 40 TABLET, DELAYED RELEASE ORAL at 16:11

## 2020-03-22 RX ADMIN — ASPIRIN 325 MG ORAL TABLET 325 MG: 325 PILL ORAL at 08:27

## 2020-03-22 RX ADMIN — ATORVASTATIN CALCIUM 80 MG: 80 TABLET, FILM COATED ORAL at 08:27

## 2020-03-22 RX ADMIN — PANTOPRAZOLE SODIUM 40 MG: 40 INJECTION, POWDER, LYOPHILIZED, FOR SOLUTION INTRAVENOUS at 23:56

## 2020-03-22 RX ADMIN — HEPARIN SODIUM 5000 UNITS: 5000 INJECTION INTRAVENOUS; SUBCUTANEOUS at 13:49

## 2020-03-22 ASSESSMENT — PAIN SCALES - GENERAL
PAINLEVEL_OUTOF10: 0
PAINLEVEL_OUTOF10: 0

## 2020-03-22 ASSESSMENT — PAIN DESCRIPTION - PAIN TYPE: TYPE: ACUTE PAIN

## 2020-03-22 ASSESSMENT — PAIN DESCRIPTION - LOCATION: LOCATION: ABDOMEN

## 2020-03-22 NOTE — PROGRESS NOTES
Hospitalist Progress Note      PCP: Corrinne Moh, MD    Date of Admission: 3/17/2020    Chief Complaint: Patient was found down    Hospital Course: See H&P    Subjective:   Patient is up in bed, comfortable, not in distress. Denies any chest pain or shortness of breath. No new event overnight noted. Medications:  Reviewed    Infusion Medications     Scheduled Medications    pantoprazole  40 mg Oral BID AC    aspirin  325 mg Oral Daily    atorvastatin  80 mg Oral Daily    azelastine  1 drop Both Eyes BID    clopidogrel  75 mg Oral Daily    sodium chloride flush  10 mL Intravenous 2 times per day    heparin (porcine)  5,000 Units Subcutaneous 3 times per day    levofloxacin  250 mg Intravenous Q24H     PRN Meds: sodium chloride flush, acetaminophen **OR** acetaminophen, promethazine **OR** ondansetron      Intake/Output Summary (Last 24 hours) at 3/22/2020 1035  Last data filed at 3/22/2020 0916  Gross per 24 hour   Intake 370 ml   Output 800 ml   Net -430 ml       Physical Exam Performed:    /74   Pulse 80   Temp 98.8 °F (37.1 °C) (Oral)   Resp 19   Wt 139 lb 1.8 oz (63.1 kg)   SpO2 98%   BMI 26.28 kg/m²     General appearance: No apparent distress, appears stated age and cooperative. HEENT: Pupils equal, round, and reactive to light. Conjunctivae/corneas clear. Neck: Supple, with full range of motion. No jugular venous distention. Trachea midline. Respiratory:  Normal respiratory effort. Clear to auscultation, bilaterally without Rales/Wheezes/Rhonchi. Cardiovascular: Regular rate and rhythm with normal S1/S2 without murmurs, rubs or gallops. Abdomen: Soft, non-tender, non-distended with normal bowel sounds. Musculoskeletal: No clubbing, cyanosis or edema bilaterally. Full range of motion without deformity. Skin: Skin color, texture, turgor normal.  No rashes or lesions. Neurologic:  Neurovascularly intact without any focal sensory/motor deficits.  Cranial nerves: II-XII intact, grossly non-focal.  Psychiatric: Alert and oriented, thought content appropriate, normal insight  Capillary Refill: Brisk,< 3 seconds   Peripheral Pulses: +2 palpable, equal bilaterally       Labs:   Recent Labs     03/20/20 0448 03/21/20  0428 03/22/20  0503   WBC 4.2 5.2 4.8   HGB 7.2* 7.3* 7.4*   HCT 20.8* 21.0* 21.6*   * 106* 122*     Recent Labs     03/20/20 0448 03/20/20  1149 03/21/20  0428 03/22/20  0503     --  141 142   K 3.7  --  3.8 3.4*     --  109 110   CO2 24  --  22 23   BUN 21*  --  16 15   CREATININE 0.6  --  <0.5* <0.5*   CALCIUM 7.6*  --  8.0* 8.2*   PHOS  --  1.7* 2.3*  --        Urinalysis:      Lab Results   Component Value Date    NITRU Negative 03/17/2020    WBCUA see below 03/17/2020    BACTERIA 3+ 03/17/2020    RBCUA 3-4 03/17/2020    BLOODU LARGE 03/17/2020    SPECGRAV 1.025 03/17/2020    GLUCOSEU Negative 03/17/2020       Radiology:  CT Head WO Contrast   Final Result   Somewhat limited by motion artifact. No intracranial bleed. Contusion along the posterior right skull. CT Cervical Spine WO Contrast   Final Result   No acute abnormality of the cervical spine. XR KNEE RIGHT (1-2 VIEWS)   Final Result   No acute finding of the bilateral elbows, bilateral knees or left   tibia/fibula. XR KNEE LEFT (1-2 VIEWS)   Final Result   No acute finding of the bilateral elbows, bilateral knees or left   tibia/fibula. XR TIBIA FIBULA LEFT (2 VIEWS)   Final Result   No acute finding of the bilateral elbows, bilateral knees or left   tibia/fibula. XR ELBOW RIGHT (MIN 3 VIEWS)   Final Result   No acute finding of the bilateral elbows, bilateral knees or left   tibia/fibula. XR ELBOW LEFT (MIN 3 VIEWS)   Final Result   No acute finding of the bilateral elbows, bilateral knees or left   tibia/fibula. XR CHEST PORTABLE   Final Result   No significant findings or interval change. Assessment/Plan:    Active Hospital Problems    Diagnosis    DENAE (acute kidney injury) ([facility] [address] [N17.9]     Rhabdomyolysis 2/2 fall  - CK >1000 on admission  - started on IVF  - continue to trend CK levels  - no evidence of trauma on imaging  Clinically improving gradually, continue IV hydration. Nontraumatic rhabdomyolysis     DENAE  - 2/2 rhabdo vs prerenal  - started on IVF  - continue to monitor closely  Gradually improving with IV hydration, nephrology consulted. Clinically resolved.     UTI  - no evidence of sepsis  - noted on UA. Urine culture pending  - started on levaquin due to allergies  - continue IVF  Clinically improving.     Acute metabolic encephalopathy  - likely due to DENAE, UTI  - continue supportive treatment    Anemia, unknown etiology, patient was found Hemoccult positive, no need for blood transfusion for now, started on PPI, GI consult. GI input noted, plan for EGD and colonoscopy in a.m.     Elevated troponin with known CAD  - mild. No chest pain reported  - likely 2/2 DENAE and rhabdo. Low concern for ACS  - continue to trend trops for now  - continue home ASA, plavix, statin. Holding BB due to hypotension     Bradycardia  - unclear etiology. Appears to be asymptomatic at present  - holding home BB  Continue to monitor on telemetry. Resolved.     Hyponatremia  - likely hypovolemic  - started on IVF  - continue to monitor.  Goal level around 129 in 24 hours     Hypokalemia  - monitor and replete     HTN  - hypotensive on admission  - holding home BP meds     HLD  - continue home statin    DVT Prophylaxis: Subcu heparin  Diet: DIET CLEAR LIQUID;  Code Status: Full Code    PT/OT Eval Status:  ordered    Dispo - 1 to 2 days    Delfina Delgado MD

## 2020-03-22 NOTE — PROGRESS NOTES
4 Eyes Skin Assessment     The patient is being assess for   Low beverly score    I agree that 2 RN's have performed a thorough Head to Toe Skin Assessment on the patient. ALL assessment sites listed below have been assessed. Areas assessed by both nurses:   [x]   Head, Face, and Ears   [x]   Shoulders, Back, and Chest, Abdomen  [x]   Arms, Elbows, and Hands   [x]   Coccyx, Sacrum, and Ischium  [x]   Legs, Feet, and Heels        Sacrum red, blanchable, skin tears @ elbows with dressing CDI    **SHARE this note so that the co-signing nurse is able to place an eSignature**    Co-signer eSignature: Electronically signed by Rajiv Umanzor RN on 3/21/20 at 9:52 PM EDT    Does the Patient have Skin Breakdown?   Yes LDA WOUND CARE was Initiated documentation include the Becca-wound, Wound Assessment, Measurements, Dressing Treatment, Drainage, and Color\",          [place] Prevention initiated:  Yes   Wound Care Orders initiated:  Yes      [address]  nurse consulted for Pressure Injury (Stage 3,4, Unstageable, DTI, NWPT, Complex wounds)and New or Established Ostomies:  No      Primary Nurse eSignature: Electronically signed by Mejia Barragan RN on 3/21/20 at 8:22 PM EDT       eyed

## 2020-03-22 NOTE — PROGRESS NOTES
Patient going to bathroom and defers examination. Discussed plans for endoscopy tomorrow.   EGD/Colon at 730 am.  Give colon prep, npo after midnight

## 2020-03-22 NOTE — PROGRESS NOTES
Attempted to give miralax prep to pt. But she refuses to drink any more. Notified Dr. Goldie Dupree that she would be agreeable to enemas.

## 2020-03-23 ENCOUNTER — ANESTHESIA EVENT (OUTPATIENT)
Dept: INPATIENT UNIT | Age: 75
DRG: 682 | End: 2020-03-23
Payer: MEDICARE

## 2020-03-23 ENCOUNTER — ANESTHESIA (OUTPATIENT)
Dept: INPATIENT UNIT | Age: 75
DRG: 682 | End: 2020-03-23
Payer: MEDICARE

## 2020-03-23 LAB
ANION GAP SERPL CALCULATED.3IONS-SCNC: 9 MMOL/L (ref 3–16)
BASOPHILS ABSOLUTE: 0 K/UL (ref 0–0.2)
BASOPHILS RELATIVE PERCENT: 0.2 %
BLOOD BANK DISPENSE STATUS: NORMAL
BLOOD BANK PRODUCT CODE: NORMAL
BPU ID: NORMAL
BUN BLDV-MCNC: 20 MG/DL (ref 7–20)
CALCIUM SERPL-MCNC: 7.9 MG/DL (ref 8.3–10.6)
CHLORIDE BLD-SCNC: 108 MMOL/L (ref 99–110)
CO2: 23 MMOL/L (ref 21–32)
CREAT SERPL-MCNC: 0.7 MG/DL (ref 0.6–1.2)
DESCRIPTION BLOOD BANK: NORMAL
EOSINOPHILS ABSOLUTE: 0 K/UL (ref 0–0.6)
EOSINOPHILS RELATIVE PERCENT: 0 %
GFR AFRICAN AMERICAN: >60
GFR NON-AFRICAN AMERICAN: >60
GLUCOSE BLD-MCNC: 164 MG/DL (ref 70–99)
HCT VFR BLD CALC: 18.7 % (ref 36–48)
HCT VFR BLD CALC: 22.7 % (ref 36–48)
HCT VFR BLD CALC: 22.7 % (ref 36–48)
HEMOGLOBIN: 6.3 G/DL (ref 12–16)
HEMOGLOBIN: 7.7 G/DL (ref 12–16)
HEMOGLOBIN: 7.8 G/DL (ref 12–16)
LYMPHOCYTES ABSOLUTE: 1.1 K/UL (ref 1–5.1)
LYMPHOCYTES RELATIVE PERCENT: 12.3 %
MCH RBC QN AUTO: 29.5 PG (ref 26–34)
MCHC RBC AUTO-ENTMCNC: 34.6 G/DL (ref 31–36)
MCV RBC AUTO: 85.3 FL (ref 80–100)
MONOCYTES ABSOLUTE: 0.7 K/UL (ref 0–1.3)
MONOCYTES RELATIVE PERCENT: 7.8 %
NEUTROPHILS ABSOLUTE: 7.4 K/UL (ref 1.7–7.7)
NEUTROPHILS RELATIVE PERCENT: 79.7 %
PDW BLD-RTO: 20.6 % (ref 12.4–15.4)
PLATELET # BLD: 140 K/UL (ref 135–450)
PMV BLD AUTO: 7.6 FL (ref 5–10.5)
POTASSIUM SERPL-SCNC: 4.4 MMOL/L (ref 3.5–5.1)
RBC # BLD: 2.66 M/UL (ref 4–5.2)
SODIUM BLD-SCNC: 140 MMOL/L (ref 136–145)
WBC # BLD: 9.3 K/UL (ref 4–11)

## 2020-03-23 PROCEDURE — 6370000000 HC RX 637 (ALT 250 FOR IP): Performed by: INTERNAL MEDICINE

## 2020-03-23 PROCEDURE — 85018 HEMOGLOBIN: CPT

## 2020-03-23 PROCEDURE — 51798 US URINE CAPACITY MEASURE: CPT

## 2020-03-23 PROCEDURE — P9016 RBC LEUKOCYTES REDUCED: HCPCS

## 2020-03-23 PROCEDURE — 2060000000 HC ICU INTERMEDIATE R&B

## 2020-03-23 PROCEDURE — 94761 N-INVAS EAR/PLS OXIMETRY MLT: CPT

## 2020-03-23 PROCEDURE — 85014 HEMATOCRIT: CPT

## 2020-03-23 PROCEDURE — 36430 TRANSFUSION BLD/BLD COMPNT: CPT

## 2020-03-23 PROCEDURE — 2700000000 HC OXYGEN THERAPY PER DAY

## 2020-03-23 PROCEDURE — 2580000003 HC RX 258: Performed by: INTERNAL MEDICINE

## 2020-03-23 PROCEDURE — 36415 COLL VENOUS BLD VENIPUNCTURE: CPT

## 2020-03-23 PROCEDURE — 51701 INSERT BLADDER CATHETER: CPT

## 2020-03-23 PROCEDURE — 6360000002 HC RX W HCPCS: Performed by: INTERNAL MEDICINE

## 2020-03-23 PROCEDURE — 80048 BASIC METABOLIC PNL TOTAL CA: CPT

## 2020-03-23 PROCEDURE — 85025 COMPLETE CBC W/AUTO DIFF WBC: CPT

## 2020-03-23 PROCEDURE — 6360000002 HC RX W HCPCS: Performed by: NURSE PRACTITIONER

## 2020-03-23 PROCEDURE — C9113 INJ PANTOPRAZOLE SODIUM, VIA: HCPCS | Performed by: NURSE PRACTITIONER

## 2020-03-23 RX ORDER — SODIUM CHLORIDE 9 MG/ML
INJECTION, SOLUTION INTRAVENOUS CONTINUOUS
Status: DISCONTINUED | OUTPATIENT
Start: 2020-03-23 | End: 2020-03-27

## 2020-03-23 RX ORDER — LEVOFLOXACIN 5 MG/ML
500 INJECTION, SOLUTION INTRAVENOUS EVERY 24 HOURS
Status: DISCONTINUED | OUTPATIENT
Start: 2020-03-23 | End: 2020-03-24

## 2020-03-23 RX ORDER — SODIUM CHLORIDE 9 MG/ML
INJECTION, SOLUTION INTRAVENOUS
Status: DISPENSED
Start: 2020-03-23 | End: 2020-03-23

## 2020-03-23 RX ORDER — 0.9 % SODIUM CHLORIDE 0.9 %
20 INTRAVENOUS SOLUTION INTRAVENOUS ONCE
Status: DISCONTINUED | OUTPATIENT
Start: 2020-03-23 | End: 2020-03-30 | Stop reason: HOSPADM

## 2020-03-23 RX ADMIN — Medication 10 ML: at 22:50

## 2020-03-23 RX ADMIN — SODIUM CHLORIDE: 9 INJECTION, SOLUTION INTRAVENOUS at 10:11

## 2020-03-23 RX ADMIN — Medication 10 ML: at 10:13

## 2020-03-23 RX ADMIN — PANTOPRAZOLE SODIUM 40 MG: 40 INJECTION, POWDER, LYOPHILIZED, FOR SOLUTION INTRAVENOUS at 22:38

## 2020-03-23 RX ADMIN — Medication 10 ML: at 22:39

## 2020-03-23 RX ADMIN — ATORVASTATIN CALCIUM 80 MG: 80 TABLET, FILM COATED ORAL at 10:10

## 2020-03-23 RX ADMIN — SODIUM CHLORIDE: 9 INJECTION, SOLUTION INTRAVENOUS at 21:55

## 2020-03-23 RX ADMIN — LEVOFLOXACIN 500 MG: 5 INJECTION, SOLUTION INTRAVENOUS at 11:47

## 2020-03-23 RX ADMIN — PANTOPRAZOLE SODIUM 40 MG: 40 INJECTION, POWDER, LYOPHILIZED, FOR SOLUTION INTRAVENOUS at 10:10

## 2020-03-23 ASSESSMENT — PAIN SCALES - GENERAL
PAINLEVEL_OUTOF10: 0

## 2020-03-23 NOTE — PROGRESS NOTES
/72   Pulse 118   Temp 98.9 °F (37.2 °C) (Oral)   Resp 16   Ht 5' 3\" (1.6 m)   Wt 143 lb 4.8 oz (65 kg)   SpO2 93%   BMI 25.38 kg/m²  on room air. Pt alert and oriented to some situational questions, with confusion. Pt denies pain, discomfort or shortness of breath. Lungs clear, diminished. NSR/ST on tele. IVF's infusing at ordered rate 125mL/hr. Pt continues to be turned and repositioned with check and change; no bowel movements thus far this shift; purewick catheter in place. BLE remain elevated off bed onto pillow with heels floated off pillow. Pt with no urine this shift, bladder scan showing 90mL. Pt drinking some fluids, declines any food thus far this shift, writer continues to encourage. Writer will continue to monitor. Bedside table and call light within reach. Bed alarm in place and activated. Will continue to monitor.   Tootie Nielsen  3/23/2020

## 2020-03-23 NOTE — CARE COORDINATION
This CM made aware that patient is still very weak and not safe to go home, especially given the fact that she lives home alone. Placed call to patient's grandson, Meghna Artis, as he was supposed to talk to patient and family over the weekend to see if they can convince patient to go to SNF for a period of time. He states that he is working on it and he does think that patient will change her mind. He would like to go ahead and have referral to [facility]. Placed call to [facility] [place] with [facility] and notified of referral. Onel Copeland faxed through epic.

## 2020-03-23 NOTE — PROGRESS NOTES
Writer spoke with Dr. Svetlana Knight during rounds regarding plan of care and SBP fluctuating 's. See order for NS continuous, H&H every 6 hours and general diet.   Aneta Manuel  3/23/2020

## 2020-03-23 NOTE — PROGRESS NOTES
Pt still has not voided since being straight cath'd around 0500 a.m, bladder scans showing 70-90mL. Writer sent Dr. Mercedes Benjamin message regarding. Abhay Knight  3/23/2020    Writer received call from Dr. Mercedes Benjamin, to monitor at this time.   Abhay Knight  3/23/2020

## 2020-03-23 NOTE — PROGRESS NOTES
2040: NP paged- \"Pt admitted for falls at home in [place]. Pt has tested positive for occult stool, suspected GI bleed, being bowel prepped. Stool has visible blood in it. Pt very pale at this time, not as active compared to other staff who has had her previous nights. Last BP at 1541 was 132/76. When I checked her BP they are 60's over 30's, highest that I was able to get was 95/62. Could I get a STAT H&H and potentially a bolus? Please advise. Thank you!\"    2106: 500 mL bolus started, pt's BP's currently cycling. Will continue to monitor. 2140: NP paged- \"FYI- H&H came back 5.8, 16.7\"    2150: Pt confused at this time. Pt's granddanielle Arciniega contacted via telephone for verbal consent for blood transfusion. Verbal consent verified by Alondra Avila, RN and Ben Miles RN.     0669: 1 unit RBC verified by Rachel Rosales, and Jostin Montes RN. Will begin transfusion at 100 mL/hr.     2247: Blood has hit pt's IV. Will monitor for any transfusion reactions. 2302: 15 minutes spent with pt. Vitals checked Q5 mins. Pt tolerated well. Will continue to monitor. 0227: 1 unit RBC verified by Rachel Rosales and [address] [place] [address]. Will begin transfusion at 175 mL/hr.    0242: Blood has hit pt's IV. Will monitor for any transfusion reactions. 0257: 15 minutes spent with pt. Vitals checked Q5 mins. Pt tolerated well. Will continue to monitor. 0420: Pt has received 2 unit's RBC total. Follow up H&H put in for 0520. Will continue to monitor. 56: NP paged- \"Pt with no urine output this shift, bladder scan showing 488 in bladder. Would you like a straight cath? Please advise. Thank you!\"    3160: Pt straight cathed, 375 mL out of bladder. Yellow, cloudy with sediment. Pt tolerated fairly well. Will continue to monitor. 5: Spoke with Dr. Rocky Sood over the phone discussing the patient's lack of bowel movements due to refusal for bowel prep, discussed that pt may not be clear for colonoscopy today. MD stated he will resume with EGD.  Will continue to monitor.

## 2020-03-23 NOTE — ANESTHESIA PRE PROCEDURE
EK-MAR-2020 Marked sinus bradycardia 49; Left ventricular hypertrophy with repolarization abnormality; Nonspecific ST & T wave changes; Prolonged QT; No previous ECGs available     Neuro/Psych:   (+) depression/anxiety             GI/Hepatic/Renal:   (+) PUD,          ROS comment: See HPI. Endo/Other:        (-) diabetes mellitus, hypothyroidism               Abdominal:           Vascular:                                        Anesthesia Plan      general     ASA 3 - emergent       Induction: intravenous. MIPS: Prophylactic antiemetics administered. Anesthetic plan and risks discussed with patient. Plan discussed with CRNA.             Georgette Estrada MD

## 2020-03-23 NOTE — PROGRESS NOTES
Hospitalist Progress Note      PCP: Anjali He MD    Date of Admission: 3/17/2020    Chief Complaint: Patient was found down    Hospital Course: See H&P    Subjective:   Patient is up in bed, comfortable, not in distress. Denies any chest pain or shortness of breath. Patient was hypotensive overnight, also from low hemoglobin at 5.8, patient was given 2 unit of packed RBC transfusion overnight. Medications:  Reviewed    Infusion Medications    sodium chloride      sodium chloride 125 mL/hr at 03/23/20 1011     Scheduled Medications    levofloxacin  500 mg Intravenous Q24H    polyethylene glycol  238 g Oral Once    pantoprazole  40 mg Intravenous BID    [Held by provider] aspirin  325 mg Oral Daily    atorvastatin  80 mg Oral Daily    azelastine  1 drop Both Eyes BID    [Held by provider] clopidogrel  75 mg Oral Daily    sodium chloride flush  10 mL Intravenous 2 times per day    [Held by provider] heparin (porcine)  5,000 Units Subcutaneous 3 times per day     PRN Meds: sodium chloride flush, acetaminophen **OR** acetaminophen, promethazine **OR** ondansetron      Intake/Output Summary (Last 24 hours) at 3/23/2020 1256  Last data filed at 3/23/2020 1050  Gross per 24 hour   Intake 1710 ml   Output 575 ml   Net 1135 ml       Physical Exam Performed:    BP 98/66   Pulse 93   Temp 98.1 °F (36.7 °C) (Oral)   Resp 16   Ht 5' 3\" (1.6 m)   Wt 143 lb 4.8 oz (65 kg)   SpO2 100%   BMI 25.38 kg/m²     General appearance: No apparent distress, appears stated age and cooperative. HEENT: Pupils equal, round, and reactive to light. Conjunctivae/corneas clear. Neck: Supple, with full range of motion. No jugular venous distention. Trachea midline. Respiratory:  Normal respiratory effort. Clear to auscultation, bilaterally without Rales/Wheezes/Rhonchi. Cardiovascular: Regular rate and rhythm with normal S1/S2 without murmurs, rubs or gallops.   Abdomen: Soft, non-tender, non-distended with normal tibia/fibula. XR ELBOW LEFT (MIN 3 VIEWS)   Final Result   No acute finding of the bilateral elbows, bilateral knees or left   tibia/fibula. XR CHEST PORTABLE   Final Result   No significant findings or interval change. Assessment/Plan:    Active Hospital Problems    Diagnosis    DENAE (acute kidney injury) ([facility] [address] [N17.9]     Rhabdomyolysis 2/2 fall  - CK >1000 on admission  - started on IVF  - continue to trend CK levels  - no evidence of trauma on imaging  Clinically improving gradually, continue IV hydration. Nontraumatic rhabdomyolysis     DENAE  - 2/2 rhabdo vs prerenal  - started on IVF  - continue to monitor closely  Gradually improving with IV hydration, nephrology consulted. Clinically resolved.     UTI  - no evidence of sepsis  - noted on UA. Urine culture pending  - started on levaquin due to allergies  - continue IVF  Clinically improving.     Acute metabolic encephalopathy  - likely due to DENAE, UTI  - continue supportive treatment    Anemia, unknown etiology, patient was found Hemoccult positive, no need for blood transfusion for now, started on PPI, GI consult. GI input noted, plan for EGD and colonoscopy in a.m. EGD and colonoscopy has been postponed due to hypotension. Acute blood loss anemia, hemoglobin dropped to 5.8 overnight, patient was given 2 unit of packed RBC transfusion with subsequent hemoglobin at 7.8, continue IV hydration, maintain blood pressure to keep map more than 65, continuing PPI, patient need GI evaluation in form of EGD and colonoscopy. Further management as per GI service.     Elevated troponin with known CAD  - mild. No chest pain reported  - likely 2/2 DENAE and rhabdo. Low concern for ACS  - continue to trend trops for now  - continue home ASA, plavix, statin. Holding BB due to hypotension. In view of active GI bleed will be holding aspirin and Plavix.     Bradycardia  - unclear etiology.  Appears to be asymptomatic at present  - holding home BB  Continue to monitor on telemetry. Resolved.     Hyponatremia  - likely hypovolemic  - started on IVF  - continue to monitor.  Goal level around 129 in 24 hours     Hypokalemia  - monitor and replete     HTN  - hypotensive on admission  - holding home BP meds     HLD  - continue home statin    DVT Prophylaxis: Subcu heparin  Diet: DIET GENERAL;  Code Status: Full Code    PT/OT Eval Status:  ordered    Dispo - 1 to 2 days    Latrice Lindsay MD

## 2020-03-23 NOTE — PLAN OF CARE
Problem: Injury - Risk of, Physical Injury:  Goal: Will remain free from falls  Description: Will remain free from falls  Note: Pt will remain free of falls this shift. Bedside table and call light within reach, bed alarm in place. Pt instructed to call out when in need of assistance, no evidence of understanding. Will continue to monitor.

## 2020-03-23 NOTE — PROGRESS NOTES
Unit of blood infusing. Pt tolerating well. Vital signs stable. Pt resting quietly in bed at this time.   Russ Akbar  3/23/2020

## 2020-03-23 NOTE — PROGRESS NOTES
/73   Pulse 97   Temp 98.2 °F (36.8 °C) (Oral)   Resp 16   Ht 5' 3\" (1.6 m)   Wt 143 lb 4.8 oz (65 kg)   SpO2 98%   BMI 25.38 kg/m²  on 4L O2 per nasal cannula, non re-breather removed, will continue to wean O2 as able. Pt alert, able to answer some situational questions, although with confusion. Pt denies pain, discomfort or shortness of breath. Lungs diminished. NSR/ST on tele. Pt turned and repositioned with check and change; clean and dry at this time; barrier cream applied. BLE elevated off bed onto pillow with heels floated off pillow. Pt denies any needs at this time. Bed alarm in place and activated. avasys in place.   Mary Rounds  3/23/2020

## 2020-03-23 NOTE — H&P
Date End Date Taking? Authorizing Provider   omeprazole (PRILOSEC) 40 MG delayed release capsule Take 1 capsule by mouth daily 3/5/20   Faizan Mcleod MD   losartan-hydrochlorothiazide [facility]) 100-12.5 MG per tablet Take 1 tablet by mouth daily 1/3/20   Faizan Mcleod MD   LORazepam (ATIVAN) 0.5 MG tablet Take 1 tablet by mouth every 6 hours as needed for Anxiety for up to 120 days. 1/3/20 5/2/20  Faizan Mcleod MD   metroNIDAZOLE (METROGEL) 0.75 % gel Apply topically 2 times daily. 10/4/19   Faizan Mcleod MD   aspirin 325 MG tablet Take 325 mg by mouth daily    Historical Provider, MD   atorvastatin (LIPITOR) 80 MG tablet Take 1 tablet by mouth daily. 9/12/14   Faizan Mcleod MD   Cholecalciferol (VITAMIN D) 2000 UNITS CAPS capsule Take 1 capsule by mouth daily. 10/25/12   Faizan Mcleod MD   azelastine (OPTIVAR) 0.05 % ophthalmic solution 1 drop 2 times daily. Historical Provider, MD   cycloSPORINE (RESTASIS) 0.05 % ophthalmic emulsion 1 drop 2 times daily. Historical Provider, MD   nitroGLYCERIN (NITROSTAT) 0.4 MG SL tablet Place 0.4 mg under the tongue every 5 minutes as needed. Historical Provider, MD   atenolol (TENORMIN) 50 MG tablet Take 50 mg by mouth daily. Historical Provider, MD   ezetimibe (ZETIA) 10 MG tablet Take 10 mg by mouth daily. Historical Provider, MD   clopidogrel (PLAVIX) 75 MG tablet Take 75 mg by mouth daily.     Historical Provider, MD         Current Facility-Administered Medications:     sodium chloride 0.9 % infusion, , , ,     polyethylene glycol (GLYCOLAX) powder 238 g, 238 g, Oral, Once, Barnetta Angelucci Lam,     pantoprazole (PROTONIX) injection 40 mg, 40 mg, Intravenous, BID, DAVE Staton NP, 40 mg at 03/22/20 1156    [Held by provider] aspirin tablet 325 mg, 325 mg, Oral, Daily, Kenneth Nolan MD, 325 mg at 03/22/20 0827    atorvastatin (LIPITOR) tablet 80 mg, 80 mg, Oral, Daily, Kenneth Nolan MD, 80 mg at 03/22/20 0827    azelastine (OPTIVAR) 0.05 % ophthalmic solution 1 drop, 1 drop, Both Eyes, BID, Antonieta De Souza MD    [Held by provider] clopidogrel (PLAVIX) tablet 75 mg, 75 mg, Oral, Daily, Antonieta De Souza MD, 75 mg at 03/22/20 0827    sodium chloride flush 0.9 % injection 10 mL, 10 mL, Intravenous, 2 times per day, Antonieta De Souza MD, 10 mL at 03/22/20 2106    sodium chloride flush 0.9 % injection 10 mL, 10 mL, Intravenous, PRN, MD [facility]  acetaminophen (TYLENOL) tablet 650 mg, 650 mg, Oral, Q6H PRN, 650 mg at 03/22/20 1124 **OR** acetaminophen (TYLENOL) suppository 650 mg, 650 mg, Rectal, Q6H PRN, Antonieta De Souza MD    promethazine (PHENERGAN) tablet 12.5 mg, 12.5 mg, Oral, Q6H PRN **OR** ondansetron (ZOFRAN) injection 4 mg, 4 mg, Intravenous, Q6H PRN, Antonieta De Souza MD, 4 mg at 03/22/20 1124    [Held by provider] heparin (porcine) injection 5,000 Units, 5,000 Units, Subcutaneous, 3 times per day, Antonieta De Souza MD, 5,000 Units at 03/22/20 1349    levofloxacin (LEVAQUIN) 250 MG/50ML infusion 250 mg, 250 mg, Intravenous, Q24H, Antonieta De Souza MD, Stopped at 03/22/20 1300      Infusions:    sodium chloride       PRN Medications: sodium chloride flush, acetaminophen **OR** acetaminophen, promethazine **OR** ondansetron  Allergies:    Allergies   Allergen Reactions    Alcohol     Cephalosporins     Vitamins A & D     Penicillins Rash         Objective:     Physical Exam:   /76   Pulse 92   Temp 98.5 °F (36.9 °C) (Oral)   Resp 16   Ht 5' 3\" (1.6 m)   Wt 143 lb 4.8 oz (65 kg)   SpO2 97%   BMI 25.38 kg/m²     HEENT: NCAT  Lungs: CTAB  CV: RRR  Abd: soft, ntd  Ext: dpi    Lab and Imaging Review   Labs:  CBC:   Recent Labs     03/21/20  0428 03/22/20  0503 03/22/20  2100 03/23/20  0658   WBC 5.2 4.8  --  9.3   HGB 7.3* 7.4* 5.8* 7.8*   HCT 21.0* 21.6* 16.7* 22.7*   MCV 80.3 81.4  --  85.3   * 122*  --  140     BMP:   Recent Labs     03/20/20  1149 03/21/20  0428 03/22/20  0503 03/23/20  0658   NA  --  141 142 140   K  --  3.8

## 2020-03-24 LAB
ANION GAP SERPL CALCULATED.3IONS-SCNC: 11 MMOL/L (ref 3–16)
BASOPHILS ABSOLUTE: 0 K/UL (ref 0–0.2)
BASOPHILS RELATIVE PERCENT: 0.1 %
BLOOD BANK DISPENSE STATUS: NORMAL
BLOOD BANK PRODUCT CODE: NORMAL
BPU ID: NORMAL
BUN BLDV-MCNC: 32 MG/DL (ref 7–20)
CALCIUM SERPL-MCNC: 7.7 MG/DL (ref 8.3–10.6)
CHLORIDE BLD-SCNC: 110 MMOL/L (ref 99–110)
CO2: 21 MMOL/L (ref 21–32)
CREAT SERPL-MCNC: 1 MG/DL (ref 0.6–1.2)
DESCRIPTION BLOOD BANK: NORMAL
EOSINOPHILS ABSOLUTE: 0 K/UL (ref 0–0.6)
EOSINOPHILS RELATIVE PERCENT: 0 %
GFR AFRICAN AMERICAN: >60
GFR NON-AFRICAN AMERICAN: 54
GLUCOSE BLD-MCNC: 116 MG/DL (ref 70–99)
HCT VFR BLD CALC: 19.9 % (ref 36–48)
HCT VFR BLD CALC: 22 % (ref 36–48)
HCT VFR BLD CALC: 23.1 % (ref 36–48)
HCT VFR BLD CALC: 24.1 % (ref 36–48)
HEMOGLOBIN: 6.9 G/DL (ref 12–16)
HEMOGLOBIN: 7.3 G/DL (ref 12–16)
HEMOGLOBIN: 7.9 G/DL (ref 12–16)
HEMOGLOBIN: 8.2 G/DL (ref 12–16)
LYMPHOCYTES ABSOLUTE: 1 K/UL (ref 1–5.1)
LYMPHOCYTES RELATIVE PERCENT: 11 %
MCH RBC QN AUTO: 29.4 PG (ref 26–34)
MCHC RBC AUTO-ENTMCNC: 34.6 G/DL (ref 31–36)
MCV RBC AUTO: 85 FL (ref 80–100)
MONOCYTES ABSOLUTE: 0.8 K/UL (ref 0–1.3)
MONOCYTES RELATIVE PERCENT: 8.9 %
NEUTROPHILS ABSOLUTE: 7 K/UL (ref 1.7–7.7)
NEUTROPHILS RELATIVE PERCENT: 80 %
PDW BLD-RTO: 18.7 % (ref 12.4–15.4)
PLATELET # BLD: 115 K/UL (ref 135–450)
PMV BLD AUTO: 7.7 FL (ref 5–10.5)
POTASSIUM SERPL-SCNC: 4.2 MMOL/L (ref 3.5–5.1)
RBC # BLD: 2.35 M/UL (ref 4–5.2)
SODIUM BLD-SCNC: 142 MMOL/L (ref 136–145)
WBC # BLD: 8.8 K/UL (ref 4–11)

## 2020-03-24 PROCEDURE — 6360000002 HC RX W HCPCS: Performed by: INTERNAL MEDICINE

## 2020-03-24 PROCEDURE — 2580000003 HC RX 258: Performed by: INTERNAL MEDICINE

## 2020-03-24 PROCEDURE — 6360000002 HC RX W HCPCS: Performed by: NURSE PRACTITIONER

## 2020-03-24 PROCEDURE — 97110 THERAPEUTIC EXERCISES: CPT

## 2020-03-24 PROCEDURE — 36415 COLL VENOUS BLD VENIPUNCTURE: CPT

## 2020-03-24 PROCEDURE — 97116 GAIT TRAINING THERAPY: CPT

## 2020-03-24 PROCEDURE — 6370000000 HC RX 637 (ALT 250 FOR IP): Performed by: INTERNAL MEDICINE

## 2020-03-24 PROCEDURE — 85018 HEMOGLOBIN: CPT

## 2020-03-24 PROCEDURE — C9113 INJ PANTOPRAZOLE SODIUM, VIA: HCPCS | Performed by: NURSE PRACTITIONER

## 2020-03-24 PROCEDURE — 80048 BASIC METABOLIC PNL TOTAL CA: CPT

## 2020-03-24 PROCEDURE — 97530 THERAPEUTIC ACTIVITIES: CPT

## 2020-03-24 PROCEDURE — P9016 RBC LEUKOCYTES REDUCED: HCPCS

## 2020-03-24 PROCEDURE — 36430 TRANSFUSION BLD/BLD COMPNT: CPT

## 2020-03-24 PROCEDURE — 85025 COMPLETE CBC W/AUTO DIFF WBC: CPT

## 2020-03-24 PROCEDURE — 6370000000 HC RX 637 (ALT 250 FOR IP): Performed by: NURSE PRACTITIONER

## 2020-03-24 PROCEDURE — 85014 HEMATOCRIT: CPT

## 2020-03-24 PROCEDURE — 2060000000 HC ICU INTERMEDIATE R&B

## 2020-03-24 RX ORDER — 0.9 % SODIUM CHLORIDE 0.9 %
20 INTRAVENOUS SOLUTION INTRAVENOUS ONCE
Status: COMPLETED | OUTPATIENT
Start: 2020-03-24 | End: 2020-03-24

## 2020-03-24 RX ORDER — ATENOLOL 25 MG/1
50 TABLET ORAL DAILY
Status: DISCONTINUED | OUTPATIENT
Start: 2020-03-24 | End: 2020-03-30 | Stop reason: HOSPADM

## 2020-03-24 RX ORDER — HALOPERIDOL 5 MG/ML
0.5 INJECTION INTRAMUSCULAR EVERY 30 MIN PRN
Status: DISPENSED | OUTPATIENT
Start: 2020-03-24 | End: 2020-03-25

## 2020-03-24 RX ORDER — LEVOFLOXACIN 5 MG/ML
250 INJECTION, SOLUTION INTRAVENOUS EVERY 24 HOURS
Status: DISCONTINUED | OUTPATIENT
Start: 2020-03-24 | End: 2020-03-25

## 2020-03-24 RX ADMIN — Medication 10 ML: at 10:08

## 2020-03-24 RX ADMIN — PANTOPRAZOLE SODIUM 40 MG: 40 INJECTION, POWDER, LYOPHILIZED, FOR SOLUTION INTRAVENOUS at 20:40

## 2020-03-24 RX ADMIN — Medication 10 ML: at 20:41

## 2020-03-24 RX ADMIN — ATORVASTATIN CALCIUM 80 MG: 80 TABLET, FILM COATED ORAL at 10:01

## 2020-03-24 RX ADMIN — PANTOPRAZOLE SODIUM 40 MG: 40 INJECTION, POWDER, LYOPHILIZED, FOR SOLUTION INTRAVENOUS at 10:01

## 2020-03-24 RX ADMIN — HALOPERIDOL LACTATE 0.5 MG: 5 INJECTION INTRAMUSCULAR at 05:52

## 2020-03-24 RX ADMIN — LEVOFLOXACIN 250 MG: 5 INJECTION, SOLUTION INTRAVENOUS at 13:11

## 2020-03-24 RX ADMIN — SODIUM CHLORIDE: 9 INJECTION, SOLUTION INTRAVENOUS at 06:02

## 2020-03-24 RX ADMIN — HALOPERIDOL LACTATE 0.5 MG: 5 INJECTION INTRAMUSCULAR at 15:41

## 2020-03-24 RX ADMIN — SODIUM CHLORIDE 20 ML: 9 INJECTION, SOLUTION INTRAVENOUS at 11:37

## 2020-03-24 ASSESSMENT — PAIN SCALES - GENERAL
PAINLEVEL_OUTOF10: 0

## 2020-03-24 NOTE — PROGRESS NOTES
Occupational Therapy  Facility/Department: [facility] C4 PCU  Daily Treatment Note  NAME: Kary Neri  : 1945  MRN: [id number]    Date of Service: 3/24/2020    Discharge Recommendations:  Subacute/Skilled Nursing Facility  OT Equipment Recommendations  Other: defer to next level of care    Assessment   Performance deficits / Impairments: Decreased functional mobility ; Decreased balance;Decreased safe awareness;Decreased ADL status; Decreased cognition;Decreased endurance;Decreased strength;Decreased high-level IADLs    Assessment: Pt pleasant and agreeable to therapy but continues to be limited due to decreased cognition. Pt required mod Ax2 for transfer with RW from bed to chair due to posterior lean. Pt demo'd poor body awareness with ambulation. Pt required cues for attending to task and RW management. HR tachy with movement but returned to 104 at rest. MD and RN aware. Pt returned to supine at end of session. Cont with POC. Prognosis: Good  OT Education: OT Role;ADL Adaptive Strategies;Transfer Training;Plan of Care;Orientation  Patient Education: importance of mobility   Barriers to Learning: cognition  REQUIRES OT FOLLOW UP: Yes  Activity Tolerance  Activity Tolerance: Patient Tolerated treatment well  Activity Tolerance: /80 at rest, 131/88  after ambulation, Per RN, Pt's HR tachy with transfer. MD requested pt returned to bed due to H+H levels and HR, check in with RN prior to seeing PT  Safety Devices  Safety Devices in place: Yes  Type of devices: Left in bed;Call light within reach; Bed alarm in place;Nurse notified;Gait belt         Patient Diagnosis(es): The primary encounter diagnosis was DENAE (acute kidney injury) ([facility] [address] Diagnoses of Non-traumatic rhabdomyolysis, Hypokalemia, Hyponatremia, Elevated troponin, Elevated lactic acid level, Multiple falls, Contusion, multiple sites, and Acute cystitis without hematuria were also pertinent to this visit.       has a past medical history Cognition  Overall Cognitive Status: Exceptions  Arousal/Alertness: Delayed responses to stimuli  Following Commands: Follows one step commands with increased time; Follows one step commands with repetition  Attention Span: Attends with cues to redirect; Difficulty dividing attention  Memory: Decreased recall of recent events;Decreased short term memory;Decreased recall of biographical Information  Safety Judgement: Decreased awareness of need for assistance;Decreased awareness of need for safety  Problem Solving: Assistance required to generate solutions;Assistance required to implement solutions  Insights: Decreased awareness of deficits  Initiation: Requires cues for some  Sequencing: Requires cues for some                    Type of ROM/Therapeutic Exercise  Type of ROM/Therapeutic Exercise: AROM  Comment: seated  Exercises  Elbow Extension: 10x  Supination: 10x  Pronation: 10x  Wrist Flexion: 10x  Wrist Extension: 10x  Grasp/Release: 10x                    Plan   Plan  Times per week: 3-5x/week   Current Treatment Recommendations: Strengthening, Balance Training, Functional Mobility Training, Endurance Training, Patient/Caregiver Education & Training, Equipment Evaluation, Education, & procurement, Self-Care / ADL, Cognitive Reorientation, Positioning, Home Management Training, Safety Education & Training, Pain Management, Gait Training    AM-[facility] Score        AM-[facility] Inpatient Daily Activity Raw Score: 13 (03/24/20 1532)  AM-[facility] Inpatient ADL T-Scale Score : 32.03 (03/24/20 1532)  ADL Inpatient CMS 0-100% Score: 63.03 (03/24/20 1532)  ADL Inpatient CMS G-Code Modifier : CL (03/24/20 1532)    Goals  Short term goals  Time Frame for Short term goals: 1 week (by 3/25/20)  Short term goal 1: Pt will complete functional transfers with Min A x1 and LRAD  Short term goal 2: Pt will complete LE dressing with Min A  Short term goal 3: Pt will perform 2-3 minutes dynamic standing with Mod A by 3/22  Patient Goals   Patient goals : \"to go home\"       Therapy Time   Individual Concurrent Group Co-treatment   Time In 0686         Time Out 6521         Minutes 25         Timed Code Treatment Minutes: 25 Minutes       Lorie Malloy OTR/L

## 2020-03-24 NOTE — PROGRESS NOTES
Perfect Serve to Camilo Orr CNP who is taking call [facility] for critical lab result. [address] [place] [address] or Facility: [facility] From: Jorgito Weir RE: Jeff Ma 1945 RM: 200 Pt has order for H/H q6 hrs. Reporting pt's critical lab value. Hgb 6.9 and Hct 19.9. Pt received 1 unit of pRBC's yesterday evening (3/23) for hgb 6.3. Pt received 2 units prbc's on 3/22 for hgb 5.8. Pt's EGD was canceled yesterday d/t pt with hypoxia, soft BP, and pt not cooperative taking off dentures per anesthesia. Pt's current vitals stable. /64 (lying) and BP sitting up in bed 112/74. Pt remains sinus rhythm on telemetry with heart rate 80's. Unable to take BP standing up as pt is too weak to stand, confused, and not following directions. No active bleeding noted. Please advise for critical H/H?  Thanks, Mansoor Fry RN

## 2020-03-24 NOTE — PLAN OF CARE
Problem: Falls - Risk of:  Goal: Will remain free from falls  Description: Will remain free from falls  Outcome: Met This Shift  Note: Pt is free from falls this shift. Bed in lowest position, side rails up x2, non skid footwear in place, fall id bracelet in place, bed alarm in place and activated, avasys in place. Call light within reach. Problem: Respiratory  Goal: O2 Sat > 90%  Outcome: Met This Shift  Note: O2 sats greater than 92% on room air most of the shift. Pt on non re-breather once returned from endo, weaned from 4L to room air successfully, continuous pulse ox in place. Problem: Injury - Risk of, Physical Injury:  Goal: Will remain free from falls  Description: Will remain free from falls  Outcome: Met This Shift  Note: Pt is free from falls this shift. Bed in lowest position, side rails up x2, non skid footwear in place, fall id bracelet in place, bed alarm in place and activated, avasys in place. Call light within reach. Problem: Skin Integrity/Risk  Goal: No skin breakdown during hospitalization  Outcome: Ongoing  Note: No new areas of skin break down noted this shift. Pt turned and repositioned every 2 hours with check and change. Pt with no bowel movements this shift. No urine output noted; following with bladder scanning. Problem: Bleeding:  Goal: Will show no signs and symptoms of excessive bleeding  Description: Will show no signs and symptoms of excessive bleeding  Outcome: Ongoing  Note: Pt with no bowel movements this shift. H&H every 6 hours. H&H this afternoon 6.3/18.7, pt receiving 1 unit of blood.

## 2020-03-24 NOTE — PROGRESS NOTES
Score: 68.66 (03/24/20 1550)  Mobility Inpatient CMS G-Code Modifier : CL (03/24/20 1550)          Goals  Short term goals  Time Frame for Short term goals: 3/25/20 unless noted  Short term goal 1: Pt will perform bed mobility with CGA by 3/24/20 --3/24 supine to sit min Ax1  Short term goal 2: Pt will perform transfers with Celia x 1; Celia for sit<>stand 3/19 --3/24 mod Ax1  Short term goal 3: Pt will ambulate 15 ft with RW and Celia x 1-2 for safety --3/24 RW 13 Min A x2  Patient Goals   Patient goals : \"to go home\"    Plan    Plan  Times per week: 3-5  Current Treatment Recommendations: Strengthening, ADL/Self-care Training, ROM, Balance Training, Functional Mobility Training, Endurance Training, Transfer Training  Safety Devices  Type of devices:  All fall risk precautions in place, Call light within reach, Nurse notified, Gait belt, Patient at risk for falls, Bed alarm in place, Left in bed     Therapy Time   Individual Concurrent Group Co-treatment   Time In 0859         Time Out 0924         Minutes 25         Timed Code Treatment Minutes: 36[facility]

## 2020-03-24 NOTE — PROGRESS NOTES
Jf Flanaganerfield CNP replied back and aware regarding pt's critical hgb 6.9 and HCT 19.9 and stated hold off blood transfusion for now as pt's heart rate and BP is stable for now. Continue with H/H q6 hrs.

## 2020-03-24 NOTE — PROGRESS NOTES
Hospitalist Progress Note      PCP: Corrinne Moh, MD    Date of Admission: 3/17/2020    Chief Complaint: Patient was found down    Hospital Course: See H&P    Subjective:   Patient is up in bed, comfortable, not in distress. Denies any chest pain or shortness of breath. No new event overnight noted. Medications:  Reviewed    Infusion Medications    sodium chloride 125 mL/hr at 03/24/20 0602     Scheduled Medications    levofloxacin  250 mg Intravenous Q24H    sodium chloride  20 mL Intravenous Once    sodium chloride  20 mL Intravenous Once    polyethylene glycol  238 g Oral Once    pantoprazole  40 mg Intravenous BID    [Held by provider] aspirin  325 mg Oral Daily    atorvastatin  80 mg Oral Daily    azelastine  1 drop Both Eyes BID    [Held by provider] clopidogrel  75 mg Oral Daily    sodium chloride flush  10 mL Intravenous 2 times per day    [Held by provider] heparin (porcine)  5,000 Units Subcutaneous 3 times per day     PRN Meds: haloperidol lactate, sodium chloride flush, acetaminophen **OR** acetaminophen, promethazine **OR** ondansetron      Intake/Output Summary (Last 24 hours) at 3/24/2020 1057  Last data filed at 3/24/2020 8225  Gross per 24 hour   Intake 2723.33 ml   Output 150 ml   Net 2573.33 ml       Physical Exam Performed:    /62   Pulse 81   Temp 98.5 °F (36.9 °C) (Oral)   Resp 18   Ht 5' 3\" (1.6 m)   Wt 145 lb 8.1 oz (66 kg)   SpO2 98%   BMI 25.77 kg/m²     General appearance: No apparent distress, appears stated age and cooperative. HEENT: Pupils equal, round, and reactive to light. Conjunctivae/corneas clear. Neck: Supple, with full range of motion. No jugular venous distention. Trachea midline. Respiratory:  Normal respiratory effort. Clear to auscultation, bilaterally without Rales/Wheezes/Rhonchi. Cardiovascular: Regular rate and rhythm with normal S1/S2 without murmurs, rubs or gallops.   Abdomen: Soft, non-tender, non-distended with normal bowel continue to trend trops for now  - continue home ASA, plavix, statin. Holding BB due to hypotension. In view of active GI bleed will be holding aspirin and Plavix.     Bradycardia  - unclear etiology. Appears to be asymptomatic at present  - holding home BB  Continue to monitor on telemetry. Resolved.     Hyponatremia  - likely hypovolemic  - started on IVF  - continue to monitor.  Goal level around 129 in 24 hours     Hypokalemia  - monitor and replete     HTN  - hypotensive on admission  - holding home BP meds     HLD  - continue home statin    DVT Prophylaxis: Subcu heparin  Diet: DIET GENERAL;  Code Status: Full Code    PT/OT Eval Status:  ordered    Dispo - 1 to 2 days    Prudence MD [place]

## 2020-03-24 NOTE — CARE COORDINATION
Received call from [facility] [place] [facility] and he states they are able to accept at discharge should patient change her mind regarding discharging to SNF.

## 2020-03-25 LAB
ANION GAP SERPL CALCULATED.3IONS-SCNC: 11 MMOL/L (ref 3–16)
BASOPHILS ABSOLUTE: 0.1 K/UL (ref 0–0.2)
BASOPHILS RELATIVE PERCENT: 1.5 %
BUN BLDV-MCNC: 23 MG/DL (ref 7–20)
CALCIUM SERPL-MCNC: 7.8 MG/DL (ref 8.3–10.6)
CHLORIDE BLD-SCNC: 116 MMOL/L (ref 99–110)
CO2: 18 MMOL/L (ref 21–32)
CREAT SERPL-MCNC: 0.6 MG/DL (ref 0.6–1.2)
EOSINOPHILS ABSOLUTE: 0 K/UL (ref 0–0.6)
EOSINOPHILS RELATIVE PERCENT: 0.4 %
GFR AFRICAN AMERICAN: >60
GFR NON-AFRICAN AMERICAN: >60
GLUCOSE BLD-MCNC: 102 MG/DL (ref 70–99)
HCT VFR BLD CALC: 21.7 % (ref 36–48)
HCT VFR BLD CALC: 22.7 % (ref 36–48)
HEMOGLOBIN: 7.6 G/DL (ref 12–16)
IMMATURE RETIC FRACT: 0.74 (ref 0.21–0.37)
LYMPHOCYTES ABSOLUTE: 0.5 K/UL (ref 1–5.1)
LYMPHOCYTES RELATIVE PERCENT: 7.6 %
MCH RBC QN AUTO: 29.6 PG (ref 26–34)
MCHC RBC AUTO-ENTMCNC: 33.6 G/DL (ref 31–36)
MCV RBC AUTO: 88 FL (ref 80–100)
MONOCYTES ABSOLUTE: 0.5 K/UL (ref 0–1.3)
MONOCYTES RELATIVE PERCENT: 8.3 %
NEUTROPHILS ABSOLUTE: 5.2 K/UL (ref 1.7–7.7)
NEUTROPHILS RELATIVE PERCENT: 82.2 %
PDW BLD-RTO: 17.7 % (ref 12.4–15.4)
PLATELET # BLD: 94 K/UL (ref 135–450)
PLATELET SLIDE REVIEW: ABNORMAL
PMV BLD AUTO: 7.7 FL (ref 5–10.5)
POTASSIUM SERPL-SCNC: 3.5 MMOL/L (ref 3.5–5.1)
RBC # BLD: 2.58 M/UL (ref 4–5.2)
RETICULOCYTE ABSOLUTE COUNT: 0.1 M/UL (ref 0.02–0.1)
RETICULOCYTE COUNT PCT: 4.16 % (ref 0.5–2.18)
SLIDE REVIEW: ABNORMAL
SODIUM BLD-SCNC: 145 MMOL/L (ref 136–145)
WBC # BLD: 6.3 K/UL (ref 4–11)

## 2020-03-25 PROCEDURE — 85045 AUTOMATED RETICULOCYTE COUNT: CPT

## 2020-03-25 PROCEDURE — 2060000000 HC ICU INTERMEDIATE R&B

## 2020-03-25 PROCEDURE — 6360000002 HC RX W HCPCS: Performed by: INTERNAL MEDICINE

## 2020-03-25 PROCEDURE — C9113 INJ PANTOPRAZOLE SODIUM, VIA: HCPCS | Performed by: NURSE PRACTITIONER

## 2020-03-25 PROCEDURE — 85025 COMPLETE CBC W/AUTO DIFF WBC: CPT

## 2020-03-25 PROCEDURE — 2580000003 HC RX 258: Performed by: INTERNAL MEDICINE

## 2020-03-25 PROCEDURE — 6370000000 HC RX 637 (ALT 250 FOR IP): Performed by: INTERNAL MEDICINE

## 2020-03-25 PROCEDURE — 6370000000 HC RX 637 (ALT 250 FOR IP): Performed by: NURSE PRACTITIONER

## 2020-03-25 PROCEDURE — 6360000002 HC RX W HCPCS: Performed by: NURSE PRACTITIONER

## 2020-03-25 PROCEDURE — 36415 COLL VENOUS BLD VENIPUNCTURE: CPT

## 2020-03-25 PROCEDURE — 80048 BASIC METABOLIC PNL TOTAL CA: CPT

## 2020-03-25 PROCEDURE — 82668 ASSAY OF ERYTHROPOIETIN: CPT

## 2020-03-25 RX ORDER — LEVOFLOXACIN 5 MG/ML
500 INJECTION, SOLUTION INTRAVENOUS EVERY 24 HOURS
Status: DISCONTINUED | OUTPATIENT
Start: 2020-03-25 | End: 2020-03-26

## 2020-03-25 RX ADMIN — ATENOLOL 50 MG: 25 TABLET ORAL at 09:32

## 2020-03-25 RX ADMIN — PANTOPRAZOLE SODIUM 40 MG: 40 INJECTION, POWDER, LYOPHILIZED, FOR SOLUTION INTRAVENOUS at 09:32

## 2020-03-25 RX ADMIN — Medication 10 ML: at 21:57

## 2020-03-25 RX ADMIN — PANTOPRAZOLE SODIUM 40 MG: 40 INJECTION, POWDER, LYOPHILIZED, FOR SOLUTION INTRAVENOUS at 21:57

## 2020-03-25 RX ADMIN — Medication 10 ML: at 09:33

## 2020-03-25 RX ADMIN — SODIUM CHLORIDE: 9 INJECTION, SOLUTION INTRAVENOUS at 11:07

## 2020-03-25 RX ADMIN — ATORVASTATIN CALCIUM 80 MG: 80 TABLET, FILM COATED ORAL at 09:32

## 2020-03-25 RX ADMIN — LEVOFLOXACIN 500 MG: 5 INJECTION, SOLUTION INTRAVENOUS at 12:09

## 2020-03-25 RX ADMIN — SODIUM CHLORIDE: 9 INJECTION, SOLUTION INTRAVENOUS at 02:02

## 2020-03-25 ASSESSMENT — PAIN SCALES - GENERAL
PAINLEVEL_OUTOF10: 0
PAINLEVEL_OUTOF10: 0

## 2020-03-25 NOTE — PLAN OF CARE
Problem: Falls - Risk of:  Goal: Will remain free from falls  Description: Will remain free from falls  Outcome: Ongoing  Goal: Absence of physical injury  Description: Absence of physical injury  Outcome: Ongoing     Problem: Cardiovascular  Goal: No DVT, peripheral vascular complications  Outcome: Ongoing  Goal: Hemodynamic stability  Outcome: Ongoing  Goal: Anticoagulate/Hct stable  Outcome: Ongoing  Goal: Agreement to quit smoking  Outcome: Ongoing  Goal: Weight maintained or lost  Outcome: Ongoing  Goal: Understanding of dietary restrictions  Outcome: Ongoing     Problem: Respiratory  Goal: No pulmonary complications  Outcome: Ongoing  Goal: O2 Sat > 90%  Outcome: Ongoing  Goal: Supplemental O2 requirements decreased  Outcome: Ongoing  Goal: Agreement to quit smoking  Outcome: Ongoing  Goal: Pneumonia vaccine at discharge as needed  Outcome: Ongoing     Problem: Nutrition  Goal: Optimal nutrition therapy  Outcome: Ongoing  Goal: Understanding of nutritional guidelines  Outcome: Ongoing     Problem: Skin Integrity/Risk  Goal: No skin breakdown during hospitalization  Outcome: Ongoing  Goal: Wound healing  Outcome: Ongoing     Problem: Musculor/Skeletal Functional Status  Goal: Highest potential functional level  Outcome: Ongoing  Goal: Absence of falls  Outcome: Ongoing     Problem: Safety:  Goal: Free from accidental physical injury  Description: Free from accidental physical injury  Outcome: Ongoing  Goal: Free from intentional harm  Description: Free from intentional harm  Outcome: Ongoing     Problem: Daily Care:  Goal: Daily care needs are met  Description: Daily care needs are met  Outcome: Ongoing     Problem: Pain:  Goal: Patient's pain/discomfort is manageable  Description: Patient's pain/discomfort is manageable  Outcome: Ongoing     Problem: Skin Integrity:  Goal: Skin integrity will stabilize  Description: Skin integrity will stabilize  Outcome: Ongoing     Problem: Discharge Planning:  Goal: Patients continuum of care needs are met  Description: Patients continuum of care needs are met  Outcome: Ongoing     Problem: Anxiety:  Goal: Level of anxiety will decrease  Description: Level of anxiety will decrease  Outcome: Ongoing     Problem: Confusion - Acute:  Goal: Absence of continued neurological deterioration signs and symptoms  Description: Absence of continued neurological deterioration signs and symptoms  Outcome: Ongoing  Goal: Mental status will be restored to baseline  Description: Mental status will be restored to baseline  Outcome: Ongoing     Problem: Discharge Planning:  Goal: Ability to perform activities of daily living will improve  Description: Ability to perform activities of daily living will improve  Outcome: Ongoing  Goal: Participates in care planning  Description: Participates in care planning  Outcome: Ongoing     Problem: Injury - Risk of, Physical Injury:  Goal: Will remain free from falls  Description: Will remain free from falls  Outcome: Ongoing  Goal: Absence of physical injury  Description: Absence of physical injury  Outcome: Ongoing     Problem: Mood - Altered:  Goal: Mood stable  Description: Mood stable  Outcome: Ongoing  Goal: Absence of abusive behavior  Description: Absence of abusive behavior  Outcome: Ongoing  Goal: Verbalizations of feeling emotionally comfortable while being cared for will increase  Description: Verbalizations of feeling emotionally comfortable while being cared for will increase  Outcome: Ongoing     Problem: Psychomotor Activity - Altered:  Goal: Absence of psychomotor disturbance signs and symptoms  Description: Absence of psychomotor disturbance signs and symptoms  Outcome: Ongoing     Problem: Sensory Perception - Impaired:  Goal: Demonstrations of improved sensory functioning will increase  Description: Demonstrations of improved sensory functioning will increase  Outcome: Ongoing  Goal: Decrease in sensory misperception

## 2020-03-25 NOTE — PROGRESS NOTES
Occupational Therapy  Attempted to see pt for OT follow-up, with pt declining any in-bed ADL/exercises or EOB/OOB activity at time of attempt, seemingly confused and continued to refuse despite education on role of OT and encouragement for mobility. Will re-attempt as schedule permits and pt is willing to participate.      Ashley June, OTR/L

## 2020-03-25 NOTE — PROGRESS NOTES
Hospitalist Progress Note      PCP: Malika Car MD    Date of Admission: 3/17/2020    Chief Complaint: Patient was found down    Hospital Course: See H&P    Subjective:   Patient is up in bed, comfortable, not in distress. Denies any chest pain or shortness of breath. Patient was tachycardic overnight, restarted home regimen of atenolol. Medications:  Reviewed    Infusion Medications    sodium chloride 125 mL/hr at 03/25/20 1107     Scheduled Medications    levofloxacin  500 mg Intravenous Q24H    atenolol  50 mg Oral Daily    sodium chloride  20 mL Intravenous Once    polyethylene glycol  238 g Oral Once    pantoprazole  40 mg Intravenous BID    [Held by provider] aspirin  325 mg Oral Daily    atorvastatin  80 mg Oral Daily    azelastine  1 drop Both Eyes BID    [Held by provider] clopidogrel  75 mg Oral Daily    sodium chloride flush  10 mL Intravenous 2 times per day    [Held by provider] heparin (porcine)  5,000 Units Subcutaneous 3 times per day     PRN Meds: sodium chloride flush, acetaminophen **OR** acetaminophen, promethazine **OR** ondansetron      Intake/Output Summary (Last 24 hours) at 3/25/2020 1116  Last data filed at 3/25/2020 0214  Gross per 24 hour   Intake 2157 ml   Output 550 ml   Net 1607 ml       Physical Exam Performed:    /68   Pulse 86   Temp 98.3 °F (36.8 °C)   Resp 18   Ht 5' 3\" (1.6 m)   Wt 151 lb 10.8 oz (68.8 kg)   SpO2 97%   BMI 26.87 kg/m²     General appearance: No apparent distress, appears stated age and cooperative. HEENT: Pupils equal, round, and reactive to light. Conjunctivae/corneas clear. Neck: Supple, with full range of motion. No jugular venous distention. Trachea midline. Respiratory:  Normal respiratory effort. Clear to auscultation, bilaterally without Rales/Wheezes/Rhonchi. Cardiovascular: Regular rate and rhythm with normal S1/S2 without murmurs, rubs or gallops.   Abdomen: Soft, non-tender, non-distended with normal bowel pain reported  - likely 2/2 DENAE and rhabdo. Low concern for ACS  - continue to trend trops for now  - continue home ASA, plavix, statin. Holding BB due to hypotension. In view of active GI bleed will be holding aspirin and Plavix.     Bradycardia  - unclear etiology. Appears to be asymptomatic at present  - holding home BB  Continue to monitor on telemetry. Resolved.     Hyponatremia  - likely hypovolemic  - started on IVF  - continue to monitor.  Goal level around 129 in 24 hours     Hypokalemia  - monitor and replete     HTN  - hypotensive on admission  - holding home BP meds     HLD  - continue home statin    DVT Prophylaxis: Subcu heparin  Diet: DIET GENERAL;  Code Status: Full Code    PT/OT Eval Status:  ordered    Dispo - 1 to 2 days    Rodney Proctor MD

## 2020-03-25 NOTE — PROGRESS NOTES
PROGRESS NOTE  S:74 yrs Patient  admitted on 3/17/2020 with DENAE (acute [facility]) ([facility] [N17.9]  DENAE (acute kidney [facility]) ([facility] [N17.9] . Patient with persistent anemia. Has history of PUD and elevated BUN/Cr ratio on admission. Unable to tolerate prep for colonoscopy. Baseline dementia. Current Hospital Schedued Meds   levofloxacin  500 mg Intravenous Q24H    atenolol  50 mg Oral Daily    sodium chloride  20 mL Intravenous Once    polyethylene glycol  238 g Oral Once    pantoprazole  40 mg Intravenous BID    [Held by provider] aspirin  325 mg Oral Daily    atorvastatin  80 mg Oral Daily    azelastine  1 drop Both Eyes BID    [Held by provider] clopidogrel  75 mg Oral Daily    sodium chloride flush  10 mL Intravenous 2 times per day    [Held by provider] heparin (porcine)  5,000 Units Subcutaneous 3 times per day     Current Hospital IV Meds   sodium chloride 125 mL/hr at 03/25/20 1107     Current Hospital PRN Meds  sodium chloride flush, acetaminophen **OR** acetaminophen, promethazine **OR** ondansetron    Exam:   Vitals:    03/25/20 1200   BP: 129/70   Pulse: 69   Resp: 18   Temp: 98.2 °F (36.8 °C)   SpO2: 97%     I/O last 3 completed shifts:   In: 2157 [I.V.:2157]  Out: 400 [Urine:400]   General appearance: alert, appears stated age and cooperative  HEENT: PERRLA and EOMI  Neck: no adenopathy, no carotid bruit, no JVD, supple, symmetrical, trachea midline and thyroid not enlarged, symmetric, no tenderness/mass/nodules  Lungs: clear to auscultation bilaterally  Heart: regular rate and rhythm, S1, S2 normal, no murmur, click, rub or gallop  Abdomen: soft, non-tender; bowel sounds normal; no masses,  no organomegaly  Extremities: extremities normal, atraumatic, no cyanosis or edema     Labs:  CBC:   Recent Labs     03/23/20  0658  03/24/20  0334  03/24/20  1619 03/24/20  2122 03/25/20  0309   WBC 9.3  --  8.8  --   --   --  6.3   HGB 7.8*   < > 6.9*   < > 8. 2* 7.9* 7.6*   HCT 22.7*   < > 19.9*   < > 24.1* 23.1* 22.7*   MCV 85.3  --  85.0  --   --   --  88.0     --  115*  --   --   --  94*    < > = values in this interval not displayed. BMP:   Recent Labs     03/23/20  0658 03/24/20  0334 03/25/20  0309    142 145   K 4.4 4.2 3.5    110 116*   CO2 23 21 18*   BUN 20 32* 23*   CREATININE 0.7 1.0 0.6     LIVER PROFILE: No results for input(s): AST, ALT, LIPASE, PROT, BILIDIR, BILITOT, ALKPHOS in the last 72 hours. Invalid input(s): AMYLASE,  ALB  PT/INR: No results for input(s): INR in the last 72 hours. Invalid input(s): PT    IMAGING:  No results found. Hospital Problems           Last Modified POA    DENAE (acute kidney injury) ([facility] 3/17/2020 Yes         Impression:  68 yo female with persistent anemia, history of PUD, and elevated BUN/Cr ratio on admission    Recommendation:  1. Check iron levels, retic count, epo, crp  2. NPO after midnight for possible EGD  3.  Hold on colonoscopy given failed/poor tolerance to prep and no obvious lower bleed      Tyra Padilla DO  3:56 PM 3/25/2020            [facility]    [address] [place] [address] L[place]     Phone: [phone]     Fax: [phone]

## 2020-03-25 NOTE — CARE COORDINATION
Received call from Krystle, patient's grandson. He is calling to get an update on patient. Updated him on patient's condition. He states he has not really had a chance to talk to his grandma regarding SNF due to she has been so tired and not really participating in conversation. He states he is going to try again today. Will follow up at a later time. Patient not ready for discharge at this time.

## 2020-03-26 LAB
ANION GAP SERPL CALCULATED.3IONS-SCNC: 10 MMOL/L (ref 3–16)
BASOPHILS ABSOLUTE: 0 K/UL (ref 0–0.2)
BASOPHILS RELATIVE PERCENT: 0.4 %
BUN BLDV-MCNC: 18 MG/DL (ref 7–20)
CALCIUM SERPL-MCNC: 7.6 MG/DL (ref 8.3–10.6)
CHLORIDE BLD-SCNC: 119 MMOL/L (ref 99–110)
CO2: 17 MMOL/L (ref 21–32)
CREAT SERPL-MCNC: <0.5 MG/DL (ref 0.6–1.2)
EOSINOPHILS ABSOLUTE: 0 K/UL (ref 0–0.6)
EOSINOPHILS RELATIVE PERCENT: 0.1 %
GFR AFRICAN AMERICAN: >60
GFR NON-AFRICAN AMERICAN: >60
GLUCOSE BLD-MCNC: 80 MG/DL (ref 70–99)
HCT VFR BLD CALC: 21.4 % (ref 36–48)
HEMOGLOBIN: 7.3 G/DL (ref 12–16)
LYMPHOCYTES ABSOLUTE: 0.7 K/UL (ref 1–5.1)
LYMPHOCYTES RELATIVE PERCENT: 12.3 %
MCH RBC QN AUTO: 30.1 PG (ref 26–34)
MCHC RBC AUTO-ENTMCNC: 34 G/DL (ref 31–36)
MCV RBC AUTO: 88.6 FL (ref 80–100)
MONOCYTES ABSOLUTE: 0.4 K/UL (ref 0–1.3)
MONOCYTES RELATIVE PERCENT: 7.2 %
NEUTROPHILS ABSOLUTE: 4.3 K/UL (ref 1.7–7.7)
NEUTROPHILS RELATIVE PERCENT: 80 %
PDW BLD-RTO: 18.6 % (ref 12.4–15.4)
PLATELET # BLD: 100 K/UL (ref 135–450)
PMV BLD AUTO: 7.8 FL (ref 5–10.5)
POTASSIUM SERPL-SCNC: 3.8 MMOL/L (ref 3.5–5.1)
RBC # BLD: 2.41 M/UL (ref 4–5.2)
REASON FOR REJECTION: NORMAL
REJECTED TEST: NORMAL
SODIUM BLD-SCNC: 146 MMOL/L (ref 136–145)
WBC # BLD: 5.4 K/UL (ref 4–11)

## 2020-03-26 PROCEDURE — 85025 COMPLETE CBC W/AUTO DIFF WBC: CPT

## 2020-03-26 PROCEDURE — 80048 BASIC METABOLIC PNL TOTAL CA: CPT

## 2020-03-26 PROCEDURE — 99152 MOD SED SAME PHYS/QHP 5/>YRS: CPT | Performed by: INTERNAL MEDICINE

## 2020-03-26 PROCEDURE — 97530 THERAPEUTIC ACTIVITIES: CPT

## 2020-03-26 PROCEDURE — 97110 THERAPEUTIC EXERCISES: CPT

## 2020-03-26 PROCEDURE — 97535 SELF CARE MNGMENT TRAINING: CPT

## 2020-03-26 PROCEDURE — 3609012400 HC EGD TRANSORAL BIOPSY SINGLE/MULTIPLE: Performed by: INTERNAL MEDICINE

## 2020-03-26 PROCEDURE — 2709999900 HC NON-CHARGEABLE SUPPLY: Performed by: INTERNAL MEDICINE

## 2020-03-26 PROCEDURE — 6360000002 HC RX W HCPCS: Performed by: NURSE PRACTITIONER

## 2020-03-26 PROCEDURE — 6370000000 HC RX 637 (ALT 250 FOR IP): Performed by: INTERNAL MEDICINE

## 2020-03-26 PROCEDURE — C9113 INJ PANTOPRAZOLE SODIUM, VIA: HCPCS | Performed by: NURSE PRACTITIONER

## 2020-03-26 PROCEDURE — 7100000011 HC PHASE II RECOVERY - ADDTL 15 MIN: Performed by: INTERNAL MEDICINE

## 2020-03-26 PROCEDURE — 7100000010 HC PHASE II RECOVERY - FIRST 15 MIN: Performed by: INTERNAL MEDICINE

## 2020-03-26 PROCEDURE — 6360000002 HC RX W HCPCS: Performed by: INTERNAL MEDICINE

## 2020-03-26 PROCEDURE — 2060000000 HC ICU INTERMEDIATE R&B

## 2020-03-26 PROCEDURE — 0DB68ZX EXCISION OF STOMACH, VIA NATURAL OR ARTIFICIAL OPENING ENDOSCOPIC, DIAGNOSTIC: ICD-10-PCS | Performed by: INTERNAL MEDICINE

## 2020-03-26 PROCEDURE — 2580000003 HC RX 258: Performed by: INTERNAL MEDICINE

## 2020-03-26 PROCEDURE — 88305 TISSUE EXAM BY PATHOLOGIST: CPT

## 2020-03-26 PROCEDURE — 6370000000 HC RX 637 (ALT 250 FOR IP): Performed by: NURSE PRACTITIONER

## 2020-03-26 PROCEDURE — 36415 COLL VENOUS BLD VENIPUNCTURE: CPT

## 2020-03-26 RX ORDER — FENTANYL CITRATE 50 UG/ML
INJECTION, SOLUTION INTRAMUSCULAR; INTRAVENOUS PRN
Status: DISCONTINUED | OUTPATIENT
Start: 2020-03-26 | End: 2020-03-26 | Stop reason: ALTCHOICE

## 2020-03-26 RX ORDER — PANTOPRAZOLE SODIUM 40 MG/1
40 TABLET, DELAYED RELEASE ORAL
Status: DISCONTINUED | OUTPATIENT
Start: 2020-03-27 | End: 2020-03-30 | Stop reason: HOSPADM

## 2020-03-26 RX ORDER — MIDAZOLAM HYDROCHLORIDE 5 MG/ML
INJECTION INTRAMUSCULAR; INTRAVENOUS PRN
Status: DISCONTINUED | OUTPATIENT
Start: 2020-03-26 | End: 2020-03-26 | Stop reason: ALTCHOICE

## 2020-03-26 RX ADMIN — SODIUM CHLORIDE: 9 INJECTION, SOLUTION INTRAVENOUS at 13:49

## 2020-03-26 RX ADMIN — HEPARIN SODIUM 5000 UNITS: 5000 INJECTION INTRAVENOUS; SUBCUTANEOUS at 21:13

## 2020-03-26 RX ADMIN — ATORVASTATIN CALCIUM 80 MG: 80 TABLET, FILM COATED ORAL at 09:20

## 2020-03-26 RX ADMIN — Medication 10 ML: at 09:21

## 2020-03-26 RX ADMIN — ATENOLOL 50 MG: 25 TABLET ORAL at 09:20

## 2020-03-26 RX ADMIN — PANTOPRAZOLE SODIUM 40 MG: 40 INJECTION, POWDER, LYOPHILIZED, FOR SOLUTION INTRAVENOUS at 09:21

## 2020-03-26 RX ADMIN — HEPARIN SODIUM 5000 UNITS: 5000 INJECTION INTRAVENOUS; SUBCUTANEOUS at 13:49

## 2020-03-26 ASSESSMENT — PAIN - FUNCTIONAL ASSESSMENT: PAIN_FUNCTIONAL_ASSESSMENT: 0-10

## 2020-03-26 ASSESSMENT — PAIN SCALES - GENERAL
PAINLEVEL_OUTOF10: 0

## 2020-03-26 NOTE — PROGRESS NOTES
Occupational Therapy  Facility/Department: [facility] C4 PCU  Daily Treatment Note  NAME: Peter Cuadra  : 1945  MRN: [id number]    Date of Service: 3/26/2020    Discharge Recommendations:  Subacute/Skilled Nursing Facility     Assessment   Performance deficits / Impairments: Decreased functional mobility ; Decreased balance;Decreased safe awareness;Decreased ADL status; Decreased cognition;Decreased endurance;Decreased strength;Decreased high-level IADLs  Assessment: Pt agreeable to OT tx after EGD. Pt was max x1 for standing balance and to side step to [facility] with RW. Demo's posterior lean upon standing. Pt reports general weakness and reports fear of falling during mobility. Recommend skilled OT services at SNF. Cont OT. Prognosis: Good  OT Education: OT Role;ADL Adaptive Strategies;Transfer Training;Plan of Care;Orientation  REQUIRES OT FOLLOW UP: Yes  Activity Tolerance  Activity Tolerance: Patient Tolerated treatment well  Safety Devices  Type of devices: Left in bed;Call light within reach; Bed alarm in place;Nurse notified;Gait belt       Patient Diagnosis(es): The primary encounter diagnosis was DENAE (acute kidney injury) ([facility] Diagnoses of Non-traumatic rhabdomyolysis, Hypokalemia, Hyponatremia, Elevated troponin, Elevated lactic acid level, Multiple falls, Contusion, multiple sites, and Acute cystitis without hematuria were also pertinent to this visit. has a past medical history of Acute myocardial infarction, unspecified site, episode of care unspecified, Anxiety state, unspecified, Coronary atherosclerosis of unspecified type of vessel, native or graft, Essential hypertension, benign, Gastritis, Osteopenia, Other and unspecified hyperlipidemia, Screening mammogram, Severe Osteopenia, and Unspecified hearing loss.    has a past surgical history that includes Percutaneous Transluminal Coronary Angio and Upper gastrointestinal endoscopy (11/18/14). Restrictions  Restrictions/Precautions  Restrictions/Precautions: General Precautions, Fall Risk, Up as Tolerated  Position Activity Restriction  Other position/activity restrictions: IV, Avasys   Subjective   General  Chart Reviewed: Yes  Patient assessed for rehabilitation services?: Yes  Family / Caregiver Present: No  Referring Practitioner: Suze Bro MD  Diagnosis: Rhabdomyolysis 2/2 fall, DENAE, UTI, Acute metabolic encephalopathy, Elevated troponin  Subjective  Subjective: Pt resting in bed at OT arrival.   General Comment  Comments: RN approved therapy   Vital Signs  Patient Currently in Pain: Denies   Orientation  Orientation  Orientation Level: Oriented to place;Oriented to person;Oriented to time;Disoriented to situation  Objective    ADL  Feeding: Setup; Beverage management  Grooming: Stand by assistance; Increased time to complete(brush hair while seated EOB)  LE Dressing: Maximum assistance(socks )     Balance  Sitting Balance: Supervision  Standing Balance: Maximum assistance(max x1 at EOB with RW)  Standing Balance  Activity: Stood 1x from EOB with max x1. Demo's posterior lean upon standing. Took 4 side steps with max x1. Requested to return to bed to rest.   Bed mobility  Supine to Sit: Minimal assistance(to L side of bed, HOB elevated)  Sit to Supine: Minimal assistance(to support upper body)  Transfers  Stand Step Transfers: Maximum assistance(side step to [facility])  Sit to stand: Maximum assistance  Stand to sit: Maximum assistance      Cognition  Arousal/Alertness: Appropriate responses to stimuli  Following Commands:  Follows one step commands with repetition  Attention Span: Attends with cues to redirect  Memory: Decreased short term memory  Safety Judgement: Decreased awareness of need for safety;Decreased awareness of need for assistance  Problem Solving: Assistance required to correct errors made;Assistance required to identify errors made;Decreased awareness of errors  Insights:

## 2020-03-26 NOTE — H&P
Gastroenterology Preop Assessment    Patient:   Gerry Bautista   :    1945   Facility:   [facility]  Referring/PCP: Jadon Fried MD  Date:     3/26/2020    Subjective:   Procedure: EGD    HPI/Reason for procedure:  Acute blood loss anemia    Past Medical History:   Diagnosis Date    Acute myocardial infarction, unspecified site, episode of care unspecified     h/o    Anxiety state, unspecified     Coronary atherosclerosis of unspecified type of vessel, native or graft     Essential hypertension, benign     Gastritis 3/27/2015    Osteopenia 2015    Other and unspecified hyperlipidemia     Screening mammogram 2009    (Both)Benign    Severe Osteopenia 2015    Unspecified hearing loss      Past Surgical History:   Procedure Laterality Date    PTCA      UPPER GASTROINTESTINAL ENDOSCOPY  14    Dr. Geraldine Paris       Social:   Social History     Tobacco Use    Smoking status: Former Smoker     Packs/day: 1.00     Years: 8.00     Pack years: 8.00     Types: Cigarettes     Last attempt to quit: 1997     Years since quittin.2    Smokeless tobacco: Never Used   Substance Use Topics    Alcohol use: Yes     Family:   Family History   Problem Relation Age of Onset    Heart Attack Father     Diabetes Mother     Heart Attack Mother        Scheduled Medications:    levofloxacin  500 mg Intravenous Q24H    atenolol  50 mg Oral Daily    sodium chloride  20 mL Intravenous Once    polyethylene glycol  238 g Oral Once    pantoprazole  40 mg Intravenous BID    [Held by provider] aspirin  325 mg Oral Daily    atorvastatin  80 mg Oral Daily    azelastine  1 drop Both Eyes BID    [Held by provider] clopidogrel  75 mg Oral Daily    sodium chloride flush  10 mL Intravenous 2 times per day    [Held by provider] heparin (porcine)  5,000 Units Subcutaneous 3 times per day       Current Medications:    Prior to Admission medications    Medication Sig Start Date End Date Taking? Authorizing Provider   omeprazole (PRILOSEC) 40 MG delayed release capsule Take 1 capsule by mouth daily 3/5/20   Murtaza Ochoa MD   losartan-hydrochlorothiazide [facility]) 100-12.5 MG per tablet Take 1 tablet by mouth daily 1/3/20   Murtaza Ochoa MD   LORazepam (ATIVAN) 0.5 MG tablet Take 1 tablet by mouth every 6 hours as needed for Anxiety for up to 120 days. 1/3/20 5/2/20  Murtaza Ochoa MD   metroNIDAZOLE (METROGEL) 0.75 % gel Apply topically 2 times daily. 10/4/19   Murtaza Ochoa MD   aspirin 325 MG tablet Take 325 mg by mouth daily    Historical Provider, MD   atorvastatin (LIPITOR) 80 MG tablet Take 1 tablet by mouth daily. 9/12/14   Murtaza Ochoa MD   Cholecalciferol (VITAMIN D) 2000 UNITS CAPS capsule Take 1 capsule by mouth daily. 10/25/12   Murtaza Ochoa MD   azelastine (OPTIVAR) 0.05 % ophthalmic solution 1 drop 2 times daily. Historical Provider, MD   cycloSPORINE (RESTASIS) 0.05 % ophthalmic emulsion 1 drop 2 times daily. Historical Provider, MD   nitroGLYCERIN (NITROSTAT) 0.4 MG SL tablet Place 0.4 mg under the tongue every 5 minutes as needed. Historical Provider, MD   atenolol (TENORMIN) 50 MG tablet Take 50 mg by mouth daily. Historical Provider, MD   ezetimibe (ZETIA) 10 MG tablet Take 10 mg by mouth daily. Historical Provider, MD   clopidogrel (PLAVIX) 75 MG tablet Take 75 mg by mouth daily.     Historical Provider, MD         Current Facility-Administered Medications:     levofloxacin (LEVAQUIN) 500 MG/100ML infusion 500 mg, 500 mg, Intravenous, Q24H, Zulay Johnston MD, Stopped at 03/25/20 1321    atenolol (TENORMIN) tablet 50 mg, 50 mg, Oral, Daily, Zamzam Dempsey, DAVE - CNP, 50 mg at 03/25/20 0932    0.9 % sodium chloride infusion, , Intravenous, Continuous, Zulay Johnston MD, Last Rate: 125 mL/hr at 03/25/20 1107    0.9 % sodium chloride bolus, 20 mL, Intravenous, Once, Zulay Johnston MD    polyethylene glycol (GLYCOLAX) powder 238 g, 238 g, Oral, Once, 03/24/20  0334  03/24/20  2122 03/25/20  0309 03/25/20  1620 03/26/20  0550   WBC 8.8  --   --  6.3  --  5.4   HGB 6.9*   < > 7.9* 7.6*  --  7.3*   HCT 19.9*   < > 23.1* 22.7* 21.7* 21.4*   MCV 85.0  --   --  88.0  --  88.6   *  --   --  94*  --  100*    < > = values in this interval not displayed. BMP:   Recent Labs     03/24/20  0334 03/25/20  0309 03/26/20  0523    145 146*   K 4.2 3.5 3.8    116* 119*   CO2 21 18* 17*   BUN 32* 23* 18   CREATININE 1.0 0.6 <0.5*     LIVER PROFILE: No results for input(s): AST, ALT, LIPASE, PROT, BILIDIR, BILITOT, ALKPHOS in the last 72 hours. Invalid input(s): AMYLASE,  ALB  PT/INR: No results for input(s): INR in the last 72 hours. Invalid input(s): PT    Pre-Procedure Assessment / Plan:  ASA: Class 3 - A patient with severe systemic disease that limits activity but is not incapacitating  Airway: Mallampati: II (soft palate, uvula, fauces visible)  Level of Sedation Plan: Moderate sedation  Post Procedure plan: Return to same level of care      Plan:   1. egd    I assessed the patient and find that the patient is in satisfactory condition to proceed with the planned procedure and sedation plan. I have explained the risk, benefits, and alternatives to the procedure; the patient understands and agrees to proceed.        Tyra Padilla  3/26/2020

## 2020-03-26 NOTE — PROGRESS NOTES
Occupational Therapy/Physical Therapy  Pt is off floor for EGD. OT/PT will try later as schedule permits.   Beverly Castro OTR/ADIEL Agustin PT, DPT

## 2020-03-26 NOTE — CARE COORDINATION
Mateo Larry RN from [facility]) screened patient for appropriateness. [facility] if she is able to discharge there today. Spoke with son Mary Busby and he is agreeable to this and understands patient is more confused than her baseline and unable to go home at discharge. Patient has refused SNF to previous  and to family. Perfect served Dr. J Carlos Vital to relay this information and see if he is agreeable to this plan and level of care .   Awaiting response from MD .

## 2020-03-26 NOTE — PLAN OF CARE
Problem: Respiratory  Goal: No pulmonary complications  Outcome: Ongoing     Problem: Respiratory  Goal: O2 Sat > 90%  Outcome: Ongoing     Problem: Cardiovascular  Goal: Anticoagulate/Hct stable  3/26/2020 1929 by Carmen Paez RN  Outcome: Ongoing

## 2020-03-26 NOTE — CARE COORDINATION
Met with Dr. Benard Schilder in MD/Case Management rounds. He is agreeable to [facility] but wants her to stay until tomorrow due to just being restarted on her plavix . Gladys Marie RN Liaison from [facility] will take tomorrow however cannot promise which location they will have a bed at until then either [facility] or [address] [place] [address]. Antonio terandanielle did not have a preference. Will notify Antonio Tang of exact location once [facility] confirms with writer. Antonio Tang requested a call from Gladys Marie at [facility] to discuss ltach and their services there. Will arrange transport and assist with transfer to [facility] tomorrow once orders placed in Epic.

## 2020-03-27 LAB
ABO/RH: NORMAL
ANION GAP SERPL CALCULATED.3IONS-SCNC: 10 MMOL/L (ref 3–16)
ANTIBODY SCREEN: NORMAL
BASOPHILS ABSOLUTE: 0.1 K/UL (ref 0–0.2)
BASOPHILS RELATIVE PERCENT: 2.2 %
BLOOD BANK DISPENSE STATUS: NORMAL
BLOOD BANK PRODUCT CODE: NORMAL
BPU ID: NORMAL
BUN BLDV-MCNC: 17 MG/DL (ref 7–20)
CALCIUM SERPL-MCNC: 7.7 MG/DL (ref 8.3–10.6)
CHLORIDE BLD-SCNC: 119 MMOL/L (ref 99–110)
CO2: 19 MMOL/L (ref 21–32)
CREAT SERPL-MCNC: <0.5 MG/DL (ref 0.6–1.2)
DESCRIPTION BLOOD BANK: NORMAL
EOSINOPHILS ABSOLUTE: 0 K/UL (ref 0–0.6)
EOSINOPHILS RELATIVE PERCENT: 0.2 %
ERYTHROPOIETIN: 46 MU/ML (ref 4–27)
GFR AFRICAN AMERICAN: >60
GFR NON-AFRICAN AMERICAN: >60
GLUCOSE BLD-MCNC: 78 MG/DL (ref 70–99)
HCT VFR BLD CALC: 20.2 % (ref 36–48)
HCT VFR BLD CALC: 26 % (ref 36–48)
HEMOGLOBIN: 6.9 G/DL (ref 12–16)
HEMOGLOBIN: 8.9 G/DL (ref 12–16)
LYMPHOCYTES ABSOLUTE: 0.7 K/UL (ref 1–5.1)
LYMPHOCYTES RELATIVE PERCENT: 13.8 %
MAGNESIUM: 1.6 MG/DL (ref 1.8–2.4)
MCH RBC QN AUTO: 30.2 PG (ref 26–34)
MCHC RBC AUTO-ENTMCNC: 34.2 G/DL (ref 31–36)
MCV RBC AUTO: 88.1 FL (ref 80–100)
MONOCYTES ABSOLUTE: 0.2 K/UL (ref 0–1.3)
MONOCYTES RELATIVE PERCENT: 5.1 %
NEUTROPHILS ABSOLUTE: 3.7 K/UL (ref 1.7–7.7)
NEUTROPHILS RELATIVE PERCENT: 78.7 %
PDW BLD-RTO: 18.8 % (ref 12.4–15.4)
PLATELET # BLD: 106 K/UL (ref 135–450)
PMV BLD AUTO: 7.8 FL (ref 5–10.5)
POTASSIUM SERPL-SCNC: 2.9 MMOL/L (ref 3.5–5.1)
POTASSIUM SERPL-SCNC: 2.9 MMOL/L (ref 3.5–5.1)
RBC # BLD: 2.29 M/UL (ref 4–5.2)
SODIUM BLD-SCNC: 148 MMOL/L (ref 136–145)
WBC # BLD: 4.8 K/UL (ref 4–11)

## 2020-03-27 PROCEDURE — 6360000002 HC RX W HCPCS: Performed by: INTERNAL MEDICINE

## 2020-03-27 PROCEDURE — 84132 ASSAY OF SERUM POTASSIUM: CPT

## 2020-03-27 PROCEDURE — 86850 RBC ANTIBODY SCREEN: CPT

## 2020-03-27 PROCEDURE — 6360000002 HC RX W HCPCS: Performed by: NURSE PRACTITIONER

## 2020-03-27 PROCEDURE — 36415 COLL VENOUS BLD VENIPUNCTURE: CPT

## 2020-03-27 PROCEDURE — 80048 BASIC METABOLIC PNL TOTAL CA: CPT

## 2020-03-27 PROCEDURE — 85025 COMPLETE CBC W/AUTO DIFF WBC: CPT

## 2020-03-27 PROCEDURE — 6370000000 HC RX 637 (ALT 250 FOR IP): Performed by: INTERNAL MEDICINE

## 2020-03-27 PROCEDURE — 86900 BLOOD TYPING SEROLOGIC ABO: CPT

## 2020-03-27 PROCEDURE — 86901 BLOOD TYPING SEROLOGIC RH(D): CPT

## 2020-03-27 PROCEDURE — 36430 TRANSFUSION BLD/BLD COMPNT: CPT

## 2020-03-27 PROCEDURE — 97110 THERAPEUTIC EXERCISES: CPT

## 2020-03-27 PROCEDURE — 2580000003 HC RX 258: Performed by: INTERNAL MEDICINE

## 2020-03-27 PROCEDURE — P9016 RBC LEUKOCYTES REDUCED: HCPCS

## 2020-03-27 PROCEDURE — 85014 HEMATOCRIT: CPT

## 2020-03-27 PROCEDURE — 6370000000 HC RX 637 (ALT 250 FOR IP): Performed by: NURSE PRACTITIONER

## 2020-03-27 PROCEDURE — 97530 THERAPEUTIC ACTIVITIES: CPT

## 2020-03-27 PROCEDURE — 86923 COMPATIBILITY TEST ELECTRIC: CPT

## 2020-03-27 PROCEDURE — 2500000003 HC RX 250 WO HCPCS: Performed by: INTERNAL MEDICINE

## 2020-03-27 PROCEDURE — 97116 GAIT TRAINING THERAPY: CPT

## 2020-03-27 PROCEDURE — 85018 HEMOGLOBIN: CPT

## 2020-03-27 PROCEDURE — 2060000000 HC ICU INTERMEDIATE R&B

## 2020-03-27 PROCEDURE — 83735 ASSAY OF MAGNESIUM: CPT

## 2020-03-27 RX ORDER — DEXTROSE, SODIUM CHLORIDE, AND POTASSIUM CHLORIDE 5; .45; .15 G/100ML; G/100ML; G/100ML
INJECTION INTRAVENOUS CONTINUOUS
Status: DISCONTINUED | OUTPATIENT
Start: 2020-03-27 | End: 2020-03-29

## 2020-03-27 RX ORDER — 0.9 % SODIUM CHLORIDE 0.9 %
20 INTRAVENOUS SOLUTION INTRAVENOUS ONCE
Status: DISCONTINUED | OUTPATIENT
Start: 2020-03-27 | End: 2020-03-30 | Stop reason: HOSPADM

## 2020-03-27 RX ORDER — MAGNESIUM SULFATE IN WATER 40 MG/ML
2 INJECTION, SOLUTION INTRAVENOUS ONCE
Status: COMPLETED | OUTPATIENT
Start: 2020-03-27 | End: 2020-03-27

## 2020-03-27 RX ORDER — POTASSIUM CHLORIDE 7.45 MG/ML
10 INJECTION INTRAVENOUS
Status: DISPENSED | OUTPATIENT
Start: 2020-03-27 | End: 2020-03-27

## 2020-03-27 RX ADMIN — ATENOLOL 50 MG: 25 TABLET ORAL at 09:42

## 2020-03-27 RX ADMIN — ATORVASTATIN CALCIUM 80 MG: 80 TABLET, FILM COATED ORAL at 09:44

## 2020-03-27 RX ADMIN — HEPARIN SODIUM 5000 UNITS: 5000 INJECTION INTRAVENOUS; SUBCUTANEOUS at 14:10

## 2020-03-27 RX ADMIN — CLOPIDOGREL BISULFATE 75 MG: 75 TABLET ORAL at 09:43

## 2020-03-27 RX ADMIN — MAGNESIUM SULFATE HEPTAHYDRATE 2 G: 40 INJECTION, SOLUTION INTRAVENOUS at 12:22

## 2020-03-27 RX ADMIN — POTASSIUM CHLORIDE 10 MEQ: 7.46 INJECTION, SOLUTION INTRAVENOUS at 11:58

## 2020-03-27 RX ADMIN — POTASSIUM CHLORIDE 10 MEQ: 7.46 INJECTION, SOLUTION INTRAVENOUS at 14:05

## 2020-03-27 RX ADMIN — HEPARIN SODIUM 5000 UNITS: 5000 INJECTION INTRAVENOUS; SUBCUTANEOUS at 20:45

## 2020-03-27 RX ADMIN — POTASSIUM CHLORIDE, DEXTROSE MONOHYDRATE AND SODIUM CHLORIDE: 150; 5; 450 INJECTION, SOLUTION INTRAVENOUS at 10:24

## 2020-03-27 RX ADMIN — PANTOPRAZOLE SODIUM 40 MG: 40 TABLET, DELAYED RELEASE ORAL at 05:53

## 2020-03-27 RX ADMIN — Medication 10 ML: at 09:54

## 2020-03-27 RX ADMIN — POTASSIUM CHLORIDE 10 MEQ: 7.46 INJECTION, SOLUTION INTRAVENOUS at 09:48

## 2020-03-27 ASSESSMENT — PAIN SCALES - PAIN ASSESSMENT IN ADVANCED DEMENTIA (PAINAD)
NEGVOCALIZATION: 0
BODYLANGUAGE: 0
FACIALEXPRESSION: 0
NEGVOCALIZATION: 0
CONSOLABILITY: 0
NEGVOCALIZATION: 0
FACIALEXPRESSION: 0
BREATHING: 0
CONSOLABILITY: 0
FACIALEXPRESSION: 0
BREATHING: 0
CONSOLABILITY: 0
TOTALSCORE: 0
BODYLANGUAGE: 0
BODYLANGUAGE: 0
TOTALSCORE: 0
BREATHING: 0
TOTALSCORE: 0

## 2020-03-27 ASSESSMENT — PAIN SCALES - GENERAL
PAINLEVEL_OUTOF10: 0

## 2020-03-27 ASSESSMENT — PAIN SCALES - WONG BAKER
WONGBAKER_NUMERICALRESPONSE: 0
WONGBAKER_NUMERICALRESPONSE: 0

## 2020-03-27 NOTE — PROGRESS NOTES
This RN was notified of panic values for patient's potassium at 2.9, and HnH at 6.9 and 20.2. Messaged [facility] Hospitalist group on [employer] to advise. Will continue to monitor patient and await orders.

## 2020-03-27 NOTE — CARE COORDINATION
Received a call from [facility] from [facility]. She states patient is appropriate for acute rehab and Dr. Hola Schultz is requesting a consult be placed so he can evaluate patient in AM. Current plan is for [facility] and nish Sarkar is in agreement. He feels she needs LTACH and then possibly rehab after an stay in [facility] due to patient still acutely ill and her medical needs could be managed better at an [facility]. Will have ARU screen in the event that patient improved and falls out of criteria for [facility] before discharge this may be an option. Otherwise nish would like to stick with the plan for [facility] Neither ARU or LTACH will require pre-cert / traditional Medicare.

## 2020-03-27 NOTE — PROGRESS NOTES
Hospitalist Progress Note      PCP: Malika Car MD    Date of Admission: 3/17/2020    Chief Complaint: Patient was found down    Hospital Course: See H&P    Subjective:   Patient is up in bed, comfortable, not in distress. Denies any chest pain or shortness of breath. No new event overnight noted. Medications:  Reviewed    Infusion Medications    dextrose 5% and 0.45% NaCl with KCl 20 mEq 50 mL/hr at 03/27/20 1024     Scheduled Medications    potassium chloride  10 mEq Intravenous Q1H    sodium chloride  20 mL Intravenous Once    pantoprazole  40 mg Oral QAM AC    atenolol  50 mg Oral Daily    sodium chloride  20 mL Intravenous Once    polyethylene glycol  238 g Oral Once    atorvastatin  80 mg Oral Daily    azelastine  1 drop Both Eyes BID    clopidogrel  75 mg Oral Daily    sodium chloride flush  10 mL Intravenous 2 times per day    heparin (porcine)  5,000 Units Subcutaneous 3 times per day     PRN Meds: sodium chloride flush, acetaminophen **OR** acetaminophen, promethazine **OR** ondansetron      Intake/Output Summary (Last 24 hours) at 3/27/2020 1108  Last data filed at 3/27/2020 0827  Gross per 24 hour   Intake 1750 ml   Output 1450 ml   Net 300 ml       Physical Exam Performed:    BP (!) 152/74   Pulse 67   Temp 98.5 °F (36.9 °C)   Resp 18   Ht 5' 3\" (1.6 m)   Wt 153 lb 14.1 oz (69.8 kg)   SpO2 98%   BMI 27.26 kg/m²     General appearance: No apparent distress, appears stated age and cooperative. HEENT: Pupils equal, round, and reactive to light. Conjunctivae/corneas clear. Neck: Supple, with full range of motion. No jugular venous distention. Trachea midline. Respiratory:  Normal respiratory effort. Clear to auscultation, bilaterally without Rales/Wheezes/Rhonchi. Cardiovascular: Regular rate and rhythm with normal S1/S2 without murmurs, rubs or gallops. Abdomen: Soft, non-tender, non-distended with normal bowel sounds.   Musculoskeletal: No clubbing, cyanosis or edema

## 2020-03-27 NOTE — PROGRESS NOTES
Physical Therapy  Facility/Department: [facility] C4 PCU  Daily Treatment Note  NAME: Jw Sexton  : 1945  MRN: [id number]    Date of Service: 3/27/2020    Discharge Recommendations:  Subacute/Skilled Nursing Facility        Assessment   Body structures, Functions, Activity limitations: Decreased functional mobility ; Decreased cognition;Decreased posture;Decreased endurance;Decreased balance  Assessment: Pt continues to demo limited cognition and limited safety awareness, does not understand why we can't just let her watch TV. Pt was educated regarding the importance of therapy and pt verbalized understanding, but unsure of carryover. Pt reuired Celia for bed mobility, mod A for sit<>stand, and assist of 2 for bed to chair transfers ns well as for gait with RW. Pt demo's significant posterior lean upon standing, but showed improvement with practice. Recommend SNF at [place] from acute setting  Treatment Diagnosis: decreased indep with mobilty  Prognosis: Good  Decision Making: Medium Complexity  PT Education: Goals;Transfer Training;Equipment;PT Role;Functional Mobility Training;Plan of Care;Gait Training  Patient Education: pt verbalized understanding, needs reinforcement  Barriers to Learning: cognition, distractibility  REQUIRES PT FOLLOW UP: Yes  Activity Tolerance  Activity Tolerance: Patient Tolerated treatment well;Patient limited by endurance; Patient limited by cognitive status     Patient Diagnosis(es): The primary encounter diagnosis was DENAE (acute kidney injury) ([facility] [address] Diagnoses of Non-traumatic rhabdomyolysis, Hypokalemia, Hyponatremia, Elevated troponin, Elevated lactic acid level, Multiple falls, Contusion, multiple sites, and Acute cystitis without hematuria were also pertinent to this visit.      has a past medical history of Acute myocardial infarction, unspecified site, episode of care unspecified, Anxiety state, unspecified, Coronary atherosclerosis of unspecified type of vessel, native or graft, Essential hypertension, benign, Gastritis, Osteopenia, Other and unspecified hyperlipidemia, Screening mammogram, Severe Osteopenia, and Unspecified hearing loss. has a past surgical history that includes Percutaneous Transluminal Coronary Angio; Upper gastrointestinal endoscopy (11/18/14); and Upper gastrointestinal endoscopy (N/A, 3/26/2020). Restrictions  Restrictions/Precautions  Restrictions/Precautions: General Precautions, Fall Risk, Up as Tolerated  Position Activity Restriction  Other position/activity restrictions: IV, Avasys   Subjective   General  Chart Reviewed: Yes  Response To Previous Treatment: Patient with no complaints from previous session. Family / Caregiver Present: No  Referring Practitioner: Oniel Perez MD  Subjective  Subjective: Pt resting in bed on approach  General Comment  Comments: Pt agreeable to PT, RN cleared pt for therapy  Pain Screening  Patient Currently in Pain: Denies  Vital Signs  Patient Currently in Pain: Denies       Orientation  Orientation  Overall Orientation Status: Impaired  Orientation Level: Disoriented to time;Disoriented to place;Oriented to situation;Oriented to person       Objective   Bed mobility  Supine to Sit: Minimal assistance  Sit to Supine: Unable to assess(up in chair at EOS)  Scooting: Stand by assistance(to EOB)  Transfers  Sit to Stand:  Moderate Assistance  Stand to sit: Moderate Assistance  Bed to Chair: 2 Person Assistance(min to mod A x2 with RW)  Comment: Pt continues to demo posterior lean upon standing, max A to maintain standing first to trials after sit<>stand, third trial improved standing balance after sit to stand, min to mod A for posterior lean  Ambulation  Ambulation?: Yes  Ambulation 1  Surface: level tile  Device: Rolling Walker  Assistance: 2 Person assistance(mod A x2 progressing to min to mod A x2)  Quality of Gait: significant posterior lean, very unsteady, assist to keep from falling backward, pt demos unaware of decreased safety  Gait Deviations: Slow Ana Maria;Decreased step length;Decreased step height  Distance: 3 ft bed to chair plus 10 ft x 2     Balance  Posture: Fair  Sitting - Static: Good;-  Sitting - Dynamic: Fair;+  Standing - Static: Poor;+  Standing - Dynamic: Poor;+  Exercises  Straight Leg Raise: 5 B  Quad Sets: x 10 B  Heelslides: x 10 B  Gluteal Sets: 10  Hip Flexion: x 15 B  Knee Long Arc Quad: x 15 B  Ankle Pumps: x 15 B                          AM-PAC Score  AM-PAC Inpatient Mobility Raw Score : 11 (03/27/20 1502)  AM-PAC Inpatient T-Scale Score : 33.86 (03/27/20 1502)  Mobility Inpatient CMS 0-100% Score: 72.57 (03/27/20 1502)  Mobility Inpatient CMS G-Code Modifier : CL (03/27/20 1502)          Goals  Short term goals  Time Frame for Short term goals: 3/25/20 unless noted  Short term goal 1: Pt will perform bed mobility with CGA by 3/24/20 --3/24 supine to sit min Ax1  Short term goal 2: Pt will perform transfers with Celia x 1; Celia for sit<>stand 3/19 --3/24 mod Ax1  Short term goal 3: Pt will ambulate 15 ft with RW and Celia x 1-2 for safety --3/24 RW 15 Min A x2  Patient Goals   Patient goals : \"to go home\"    Plan    Plan  Times per week: 3-5  Current Treatment Recommendations: Strengthening, ADL/Self-care Training, ROM, Balance Training, Functional Mobility Training, Endurance Training, Transfer Training  Safety Devices  Type of devices: All fall risk precautions in place, Call light within reach, Nurse notified, Gait belt, Patient at risk for falls, Left in chair, Chair alarm in place     Therapy Time   Individual Concurrent Group Co-treatment   Time In 1313         Time Out 1342         Minutes 29              If pt is discharged prior to next therapy session, this note will serve as discharge summary.     Kaitlin Cole, PT

## 2020-03-27 NOTE — CARE COORDINATION
Per Dr. Dierdre Soulier patient is not ready for discharge to [facility] today due to needing blood transfusion and potassium replacement Confirmed with Gilbert Camarillo RN Liaison for [facility] that patient will go to the [facility] location. [facility] would could accept patient this evening if MD wanted to discharge patient after she gets blood and potassium. Otherwise they can accept her over the weekend.

## 2020-03-27 NOTE — PROGRESS NOTES
Patient's potassium 2.9 this morning. Down from 3.8. Potassium IV ordered. Do you think accurate reading? Would you like for potassium to be checked again or proceed with administration of IV potassium? Thanks!  Sent to Dr. Edson Wiseman

## 2020-03-27 NOTE — PROGRESS NOTES
Training, Patient/Caregiver Education & Training, Self-Care / ADL, Cognitive Reorientation, Positioning, Safety Education & Training           AM-PAC Score        AM-PAC Inpatient Daily Activity Raw Score: 14 (03/27/20 1515)  AM-PAC Inpatient ADL T-Scale Score : 33.39 (03/27/20 1515)  ADL Inpatient CMS 0-100% Score: 59.67 (03/27/20 1515)  ADL Inpatient CMS G-Code Modifier : CK (03/27/20 1515)    Goals  Short term goals  Time Frame for Short term goals: 1 week (by 3/25/20-continue to 4/03)  Short term goal 1: Pt will complete functional transfers with Min A x1 and LRAD  Short term goal 2: Pt will complete LE dressing with Min A  Short term goal 3: Pt will perform 2-3 minutes dynamic standing with Mod A by 4/01  Patient Goals   Patient goals : \"to go home\"       Therapy Time   Individual Concurrent Group Co-treatment   Time In [address]         Time Out [address]         Minutes [address]

## 2020-03-27 NOTE — PROGRESS NOTES
Potassium came back as 2.9. Did you want IV potassium along with the IVF with 20 mEq potassium? Thanks! Sent to Dr. Mercedes Benjamin. Per hospitalist, give only 3 bags of 10 mEq potassium instead of the six ordered. Continue with IVF.

## 2020-03-27 NOTE — PROGRESS NOTES
PROGRESS NOTE  S:74 yrs Patient  admitted on 3/17/2020 with DENAE (acute kidney injury) ([facility] [N17.9]  DENAE (acute kidney injury) ([facility] [N17.9] . Persistent anemia. No stool output. No symptomatic complaints. Current Hospital Schedued Meds   potassium chloride  10 mEq Intravenous Q1H    sodium chloride  20 mL Intravenous Once    pantoprazole  40 mg Oral QAM AC    atenolol  50 mg Oral Daily    sodium chloride  20 mL Intravenous Once    polyethylene glycol  238 g Oral Once    atorvastatin  80 mg Oral Daily    azelastine  1 drop Both Eyes BID    clopidogrel  75 mg Oral Daily    sodium chloride flush  10 mL Intravenous 2 times per day    heparin (porcine)  5,000 Units Subcutaneous 3 times per day     Current Hospital IV Meds   dextrose 5% and 0.45% NaCl with KCl 20 mEq 50 mL/hr at 03/27/20 1024     Current Hospital PRN Meds  sodium chloride flush, acetaminophen **OR** acetaminophen, promethazine **OR** ondansetron    Exam:   Vitals:    03/27/20 1058   BP: (!) 152/74   Pulse: 67   Resp:    Temp: 98.5 °F (36.9 °C)   SpO2:      I/O last 3 completed shifts: In: 4992 [P.O.:120;  I.V.:1390]  Out: 1250 [Urine:1250]   General appearance: alert, appears stated age and cooperative  HEENT: PERRLA  Neck: no adenopathy, no carotid bruit, no JVD, supple, symmetrical, trachea midline and thyroid not enlarged, symmetric, no tenderness/mass/nodules  Lungs: clear to auscultation bilaterally  Heart: regular rate and rhythm, S1, S2 normal, no murmur, click, rub or gallop  Abdomen: soft, non-tender; bowel sounds normal; no masses,  no organomegaly  Extremities: extremities normal, atraumatic, no cyanosis or edema     Labs:  CBC:   Recent Labs     03/25/20  0309 03/25/20  1620 03/26/20  0550 03/27/20  0452   WBC 6.3  --  5.4 4.8   HGB 7.6*  --  7.3* 6.9*   HCT 22.7* 21.7* 21.4* 20.2*   MCV 88.0  --  88.6 88.1   PLT 94*  --  100* 106*     BMP:   Recent Labs     03/25/20  0309

## 2020-03-28 LAB
ANION GAP SERPL CALCULATED.3IONS-SCNC: 11 MMOL/L (ref 3–16)
BASOPHILS ABSOLUTE: 0.1 K/UL (ref 0–0.2)
BASOPHILS RELATIVE PERCENT: 0.7 %
BUN BLDV-MCNC: 12 MG/DL (ref 7–20)
CALCIUM SERPL-MCNC: 7.5 MG/DL (ref 8.3–10.6)
CHLORIDE BLD-SCNC: 114 MMOL/L (ref 99–110)
CO2: 19 MMOL/L (ref 21–32)
CREAT SERPL-MCNC: <0.5 MG/DL (ref 0.6–1.2)
EOSINOPHILS ABSOLUTE: 0 K/UL (ref 0–0.6)
EOSINOPHILS RELATIVE PERCENT: 0.1 %
GFR AFRICAN AMERICAN: >60
GFR NON-AFRICAN AMERICAN: >60
GLUCOSE BLD-MCNC: 116 MG/DL (ref 70–99)
HCT VFR BLD CALC: 27.4 % (ref 36–48)
HEMOGLOBIN: 9.6 G/DL (ref 12–16)
LYMPHOCYTES ABSOLUTE: 0.8 K/UL (ref 1–5.1)
LYMPHOCYTES RELATIVE PERCENT: 9.3 %
MCH RBC QN AUTO: 30 PG (ref 26–34)
MCHC RBC AUTO-ENTMCNC: 34.9 G/DL (ref 31–36)
MCV RBC AUTO: 86.1 FL (ref 80–100)
MONOCYTES ABSOLUTE: 0.5 K/UL (ref 0–1.3)
MONOCYTES RELATIVE PERCENT: 5.8 %
NEUTROPHILS ABSOLUTE: 7.5 K/UL (ref 1.7–7.7)
NEUTROPHILS RELATIVE PERCENT: 84.1 %
PDW BLD-RTO: 18.8 % (ref 12.4–15.4)
PLATELET # BLD: 130 K/UL (ref 135–450)
PMV BLD AUTO: 7.7 FL (ref 5–10.5)
POTASSIUM SERPL-SCNC: 3.3 MMOL/L (ref 3.5–5.1)
RBC # BLD: 3.19 M/UL (ref 4–5.2)
SODIUM BLD-SCNC: 144 MMOL/L (ref 136–145)
WBC # BLD: 8.9 K/UL (ref 4–11)

## 2020-03-28 PROCEDURE — 2580000003 HC RX 258: Performed by: INTERNAL MEDICINE

## 2020-03-28 PROCEDURE — 97530 THERAPEUTIC ACTIVITIES: CPT

## 2020-03-28 PROCEDURE — 97535 SELF CARE MNGMENT TRAINING: CPT

## 2020-03-28 PROCEDURE — 6370000000 HC RX 637 (ALT 250 FOR IP): Performed by: INTERNAL MEDICINE

## 2020-03-28 PROCEDURE — 6360000002 HC RX W HCPCS: Performed by: INTERNAL MEDICINE

## 2020-03-28 PROCEDURE — 2500000003 HC RX 250 WO HCPCS: Performed by: INTERNAL MEDICINE

## 2020-03-28 PROCEDURE — 2060000000 HC ICU INTERMEDIATE R&B

## 2020-03-28 PROCEDURE — 85025 COMPLETE CBC W/AUTO DIFF WBC: CPT

## 2020-03-28 PROCEDURE — 6370000000 HC RX 637 (ALT 250 FOR IP): Performed by: HOSPITALIST

## 2020-03-28 PROCEDURE — 6360000002 HC RX W HCPCS: Performed by: HOSPITALIST

## 2020-03-28 PROCEDURE — 36415 COLL VENOUS BLD VENIPUNCTURE: CPT

## 2020-03-28 PROCEDURE — 6370000000 HC RX 637 (ALT 250 FOR IP): Performed by: NURSE PRACTITIONER

## 2020-03-28 PROCEDURE — 97116 GAIT TRAINING THERAPY: CPT

## 2020-03-28 PROCEDURE — 80048 BASIC METABOLIC PNL TOTAL CA: CPT

## 2020-03-28 PROCEDURE — 97110 THERAPEUTIC EXERCISES: CPT

## 2020-03-28 PROCEDURE — 2580000003 HC RX 258: Performed by: HOSPITALIST

## 2020-03-28 RX ORDER — POTASSIUM CHLORIDE 750 MG/1
40 TABLET, EXTENDED RELEASE ORAL 2 TIMES DAILY
Status: COMPLETED | OUTPATIENT
Start: 2020-03-28 | End: 2020-03-28

## 2020-03-28 RX ADMIN — PANTOPRAZOLE SODIUM 40 MG: 40 TABLET, DELAYED RELEASE ORAL at 04:57

## 2020-03-28 RX ADMIN — Medication 10 ML: at 08:26

## 2020-03-28 RX ADMIN — CLOPIDOGREL BISULFATE 75 MG: 75 TABLET ORAL at 08:26

## 2020-03-28 RX ADMIN — POTASSIUM CHLORIDE 40 MEQ: 750 TABLET, EXTENDED RELEASE ORAL at 20:23

## 2020-03-28 RX ADMIN — POTASSIUM CHLORIDE 40 MEQ: 750 TABLET, EXTENDED RELEASE ORAL at 11:07

## 2020-03-28 RX ADMIN — POTASSIUM CHLORIDE, DEXTROSE MONOHYDRATE AND SODIUM CHLORIDE: 150; 5; 450 INJECTION, SOLUTION INTRAVENOUS at 11:07

## 2020-03-28 RX ADMIN — ATENOLOL 50 MG: 25 TABLET ORAL at 08:26

## 2020-03-28 RX ADMIN — HEPARIN SODIUM 5000 UNITS: 5000 INJECTION INTRAVENOUS; SUBCUTANEOUS at 04:57

## 2020-03-28 RX ADMIN — HEPARIN SODIUM 5000 UNITS: 5000 INJECTION INTRAVENOUS; SUBCUTANEOUS at 20:23

## 2020-03-28 RX ADMIN — ATORVASTATIN CALCIUM 80 MG: 80 TABLET, FILM COATED ORAL at 08:26

## 2020-03-28 RX ADMIN — IRON SUCROSE 100 MG: 20 INJECTION, SOLUTION INTRAVENOUS at 19:58

## 2020-03-28 ASSESSMENT — PAIN SCALES - PAIN ASSESSMENT IN ADVANCED DEMENTIA (PAINAD)
FACIALEXPRESSION: 0
NEGVOCALIZATION: 0
CONSOLABILITY: 0
NEGVOCALIZATION: 0
BREATHING: 0
TOTALSCORE: 0
CONSOLABILITY: 0
BODYLANGUAGE: 0
BREATHING: 0
TOTALSCORE: 0
BREATHING: 0
TOTALSCORE: 0
NEGVOCALIZATION: 0
TOTALSCORE: 0
NEGVOCALIZATION: 0
CONSOLABILITY: 0
TOTALSCORE: 0
FACIALEXPRESSION: 0
NEGVOCALIZATION: 0
BREATHING: 0
NEGVOCALIZATION: 0
CONSOLABILITY: 0
BODYLANGUAGE: 0
BODYLANGUAGE: 0
BREATHING: 0
FACIALEXPRESSION: 0
BREATHING: 0
FACIALEXPRESSION: 0
FACIALEXPRESSION: 0
CONSOLABILITY: 0
BODYLANGUAGE: 0
NEGVOCALIZATION: 0
NEGVOCALIZATION: 0
CONSOLABILITY: 0
BODYLANGUAGE: 0
BREATHING: 0
FACIALEXPRESSION: 0
CONSOLABILITY: 0
TOTALSCORE: 0
NEGVOCALIZATION: 0
TOTALSCORE: 0
NEGVOCALIZATION: 0
NEGVOCALIZATION: 0
FACIALEXPRESSION: 0
NEGVOCALIZATION: 0
FACIALEXPRESSION: 0
CONSOLABILITY: 0
FACIALEXPRESSION: 0
TOTALSCORE: 0
BODYLANGUAGE: 0
TOTALSCORE: 0
BODYLANGUAGE: 0
FACIALEXPRESSION: 0
TOTALSCORE: 0
BODYLANGUAGE: 0
NEGVOCALIZATION: 0
NEGVOCALIZATION: 0
TOTALSCORE: 0
CONSOLABILITY: 0
CONSOLABILITY: 0
TOTALSCORE: 0
BODYLANGUAGE: 0
CONSOLABILITY: 0
BREATHING: 0
CONSOLABILITY: 0
FACIALEXPRESSION: 0
FACIALEXPRESSION: 0
TOTALSCORE: 0
BREATHING: 0
BREATHING: 0
FACIALEXPRESSION: 0
BODYLANGUAGE: 0
BODYLANGUAGE: 0
BREATHING: 0
FACIALEXPRESSION: 0
BREATHING: 0
BREATHING: 0
BODYLANGUAGE: 0
CONSOLABILITY: 0
BODYLANGUAGE: 0
BODYLANGUAGE: 0
TOTALSCORE: 0
CONSOLABILITY: 0
BREATHING: 0

## 2020-03-28 ASSESSMENT — PAIN SCALES - GENERAL
PAINLEVEL_OUTOF10: 0

## 2020-03-28 NOTE — PROGRESS NOTES
Roly Houston is a 69 y.o. female patient.     Current Facility-Administered Medications   Medication Dose Route Frequency Provider Last Rate Last Dose    potassium chloride (KLOR-CON M) extended release tablet 40 mEq  40 mEq Oral BID Silvina Keys MD   40 mEq at 03/28/20 1107    0.9 % sodium chloride bolus  20 mL Intravenous Once DAVE Hong - CNP   Stopped at 03/27/20 1120    dextrose 5 % and 0.45 % NaCl with KCl 20 mEq infusion   Intravenous Continuous Zulay Johnston MD 50 mL/hr at 03/28/20 1107      pantoprazole (PROTONIX) tablet 40 mg  40 mg Oral QAM AC Zulay Johnston MD   40 mg at 03/28/20 0457    atenolol (TENORMIN) tablet 50 mg  50 mg Oral Daily DAVE Hong - CNP   50 mg at 03/28/20 0826    0.9 % sodium chloride bolus  20 mL Intravenous Once Zulay Johnston MD        polyethylene glycol (GLYCOLAX) powder 238 g  238 g Oral Once Sania Laughlin DO        atorvastatin (LIPITOR) tablet 80 mg  80 mg Oral Daily Gardenia Prieto MD   80 mg at 03/28/20 0826    azelastine (OPTIVAR) 0.05 % ophthalmic solution 1 drop  1 drop Both Eyes BID Gardenia Prieto MD        clopidogrel (PLAVIX) tablet 75 mg  75 mg Oral Daily Gardenia Prieto MD   75 mg at 03/28/20 3350    sodium chloride flush 0.9 % injection 10 mL  10 mL Intravenous 2 times per day Gardenia Prieto MD   10 mL at 03/28/20 0826    sodium chloride flush 0.9 % injection 10 mL  10 mL Intravenous PRN Gardenia Prieto MD   10 mL at 03/23/20 2250    acetaminophen (TYLENOL) tablet 650 mg  650 mg Oral Q6H PRN Gardenia Prieto MD   650 mg at 03/22/20 1124    Or    acetaminophen (TYLENOL) suppository 650 mg  650 mg Rectal Q6H PRN Gardenia Prieto MD        promethazine (PHENERGAN) tablet 12.5 mg  12.5 mg Oral Q6H PRN Gardenia Prieto MD        Or    ondansetron [facility]) injection 4 mg  4 mg Intravenous Q6H PRN Gardenia Prieto MD   4 mg at 03/22/20 1124    heparin (porcine) injection 5,000 Units  5,000 Units Subcutaneous 3 times per day

## 2020-03-28 NOTE — PROGRESS NOTES
3/26/2020). Restrictions  Restrictions/Precautions  Restrictions/Precautions: General Precautions, Fall Risk, Up as Tolerated  Position Activity Restriction  Other position/activity restrictions: IV, Avasys      Subjective   General  Chart Reviewed: Yes  Patient assessed for rehabilitation services?: Yes  Family / Caregiver Present: No  Referring Practitioner: Nilson Bryant MD  Diagnosis: Rhabdomyolysis 2/2 fall, DENAE, UTI, Acute metabolic encephalopathy, Elevated troponin  Subjective  Subjective: \"I already walked really far today. I can't do much now. \" Per RN, pt has not been up walking. General Comment  Comments: RN cleared pt for OT; pt resting in bed, agreeable to therapy     Vital Signs  Patient Currently in Pain: Denies     Orientation  Orientation  Overall Orientation Status: Impaired  Orientation Level: Oriented to person;Disoriented to time;Disoriented to situation;Oriented to place     Objective    ADL  Feeding: Setup(to take pills and drink seated at end of session)  Grooming: Supervision;Setup(to wash face seated)  LE Dressing: Dependent/Total(mod A for standing balance with RW plus max A of another for donning/doffing brief)  Toileting: Dependent/Total(pure-wick)           Balance  Sitting Balance: Supervision  Standing Balance: Dependent/Total(mod A x2 with RW)  Functional Mobility  Functional Mobility Comments: Pt walked ~4ft from bed to chair with gait belt, RW, and mod A x2. Posterior lean. Bed mobility  Supine to Sit: Contact guard assistance(HOB raised, use of bed rail)  Scooting: Stand by assistance(to EOB)     Transfers  Sit to stand: Dependent/Total(mod A x2)  Stand to sit: Dependent/Total(mod A x2)  Transfer Comments: On/off chair 2x and bed 1x                          Cognition  Overall Cognitive Status: Exceptions  Arousal/Alertness: Appropriate responses to stimuli  Following Commands:  Follows one step commands with repetition  Attention Span: Difficulty dividing attention  Memory:

## 2020-03-28 NOTE — PROGRESS NOTES
Hospitalist Progress Note      PCP: Ned Christine MD    Date of Admission: 3/17/2020    Chief Complaint: patient was found down    Hospital Course:     Subjective: patient is lying in the bed comfortable denies any chest pain or shortness of breath no nausea vomiting      Medications:  Reviewed    Infusion Medications    dextrose 5% and 0.45% NaCl with KCl 20 mEq 50 mL/hr at 03/27/20 1024     Scheduled Medications    sodium chloride  20 mL Intravenous Once    pantoprazole  40 mg Oral QAM AC    atenolol  50 mg Oral Daily    sodium chloride  20 mL Intravenous Once    polyethylene glycol  238 g Oral Once    atorvastatin  80 mg Oral Daily    azelastine  1 drop Both Eyes BID    clopidogrel  75 mg Oral Daily    sodium chloride flush  10 mL Intravenous 2 times per day    heparin (porcine)  5,000 Units Subcutaneous 3 times per day     PRN Meds: sodium chloride flush, acetaminophen **OR** acetaminophen, promethazine **OR** ondansetron      Intake/Output Summary (Last 24 hours) at 3/28/2020 0916  Last data filed at 3/28/2020 0907  Gross per 24 hour   Intake 2426.4 ml   Output 900 ml   Net 1526.4 ml       Physical Exam Performed:    BP (!) 173/74   Pulse 69   Temp 98.4 °F (36.9 °C) (Oral)   Resp 16   Ht 5' 3\" (1.6 m)   Wt 152 lb 12.8 oz (69.3 kg)   SpO2 94%   BMI 27.07 kg/m²     General appearance: No apparent distress, appears stated age and cooperative. HEENT: Pupils equal, round, and reactive to light. Conjunctivae/corneas clear. ecchymosis around right eyes  Neck: Supple, with full range of motion. No jugular venous distention. Trachea midline. Respiratory:  Normal respiratory effort. Clear to auscultation, bilaterally without Rales/Wheezes/Rhonchi. Cardiovascular: Regular rate and rhythm with normal S1/S2 without murmurs, rubs or gallops. Abdomen: Soft, non-tender, non-distended with normal bowel sounds. Musculoskeletal: No clubbing, cyanosis or edema bilaterally.   Full range of motion without deformity. Skin: Skin color, texture, turgor normal.  No rashes or, bruising bilateral lower extremity. lesions. Neurologic:  Neurovascularly intact without any focal sensory/motor deficits. Cranial nerves: II-XII intact, grossly non-focal.  Psychiatric: Alert and oriented, thought content appropriate, normal insight  Capillary Refill: Brisk,< 3 seconds   Peripheral Pulses: +2 palpable, equal bilaterally       Labs:   Recent Labs     03/26/20  0550 03/27/20  0452 03/27/20  1643 03/28/20  0507   WBC 5.4 4.8  --  8.9   HGB 7.3* 6.9* 8.9* 9.6*   HCT 21.4* 20.2* 26.0* 27.4*   * 106*  --  130*     Recent Labs     03/26/20  0523 03/27/20  0452 03/27/20  1038 03/28/20  0507   * 148*  --  144   K 3.8 2.9* 2.9* 3.3*   * 119*  --  114*   CO2 17* 19*  --  19*   BUN 18 17  --  12   CREATININE <0.5* <0.5*  --  <0.5*   CALCIUM 7.6* 7.7*  --  7.5*     No results for input(s): AST, ALT, BILIDIR, BILITOT, ALKPHOS in the last 72 hours. No results for input(s): INR in the last 72 hours. No results for input(s): Monda Saupe in the last 72 hours. Urinalysis:      Lab Results   Component Value Date    NITRU Negative 03/17/2020    WBCUA see below 03/17/2020    BACTERIA 3+ 03/17/2020    RBCUA 3-4 03/17/2020    BLOODU LARGE 03/17/2020    SPECGRAV 1.025 03/17/2020    GLUCOSEU Negative 03/17/2020       Radiology:  CT Head WO Contrast   Final Result   Somewhat limited by motion artifact. No intracranial bleed. Contusion along the posterior right skull. CT Cervical Spine WO Contrast   Final Result   No acute abnormality of the cervical spine. XR KNEE RIGHT (1-2 VIEWS)   Final Result   No acute finding of the bilateral elbows, bilateral knees or left   tibia/fibula. XR KNEE LEFT (1-2 VIEWS)   Final Result   No acute finding of the bilateral elbows, bilateral knees or left   tibia/fibula.          XR TIBIA FIBULA LEFT (2 VIEWS)   Final Result   No acute finding of the bilateral elbows,

## 2020-03-28 NOTE — PROGRESS NOTES
Physical Therapy  Facility/Department: [facility] C4 PCU  Daily Treatment Note  NAME: Bri Washington  : 1945  MRN: [id number]    Date of Service: 3/28/2020    Discharge Recommendations:  Subacute/Skilled Nursing Facility      Assessment   Body structures, Functions, Activity limitations: Decreased functional mobility ; Decreased cognition;Decreased posture;Decreased endurance;Decreased balance  Assessment: Pt continues to require mod A x2 for transfers and short distance ambulation Pt fatigues quickly and continues to require encouragement to participate secondary to impaired cognition. Will continue to progress mobility as pt tolerates  Treatment Diagnosis: decreased indep with mobilty  Prognosis: Good  PT Education: Goals;Transfer Training;Equipment;PT Role;Functional Mobility Training;Plan of Care;Gait Training  Patient Education: pt verbalized understanding, needs reinforcement  REQUIRES PT FOLLOW UP: Yes  Activity Tolerance  Activity Tolerance: Patient Tolerated treatment well;Patient limited by cognitive status; Patient limited by endurance     Patient Diagnosis(es): The primary encounter diagnosis was DENAE (acute kidney injury) ([facility] [address] Diagnoses of Non-traumatic rhabdomyolysis, Hypokalemia, Hyponatremia, Elevated troponin, Elevated lactic acid level, Multiple falls, Contusion, multiple sites, and Acute cystitis without hematuria were also pertinent to this visit. has a past medical history of Acute myocardial infarction, unspecified site, episode of care unspecified, Anxiety state, unspecified, Coronary atherosclerosis of unspecified type of vessel, native or graft, Essential hypertension, benign, Gastritis, Osteopenia, Other and unspecified hyperlipidemia, Screening mammogram, Severe Osteopenia, and Unspecified hearing loss. has a past surgical history that includes Percutaneous Transluminal Coronary Angio;  Upper gastrointestinal endoscopy (14); and Upper gastrointestinal endoscopy (N/A, 3/26/2020). Restrictions  Restrictions/Precautions  Restrictions/Precautions: General Precautions, Fall Risk, Up as Tolerated  Position Activity Restriction  Other position/activity restrictions: IV, Avasys   Subjective   General  Chart Reviewed: Yes  Family / Caregiver Present: No  Referring Practitioner: Sarah Moreno MD  Subjective  Subjective: Pt agreeable to therapy with encouragement. \"I've been up several times today already. \"  General Comment  Comments: Pt supine in bed upon arrival.  Pain Screening  Patient Currently in Pain: Denies  Vital Signs  Patient Currently in Pain: Denies       Objective   Bed mobility  Supine to Sit: Contact guard assistance  Sit to Supine: Unable to assess(up in chair at end of session)  Scooting: Stand by assistance(at EOB)  Transfers  Sit to Stand: Minimal Assistance; Moderate Assistance(min A from EOB; mod A from bedside chair x3 reps)  Stand to sit: Moderate Assistance  Bed to Chair: Moderate assistance;Minimal assistance;2 Person Assistance(assist for turning walker and upright posture)  Comment: posterior lean upon standing with cues for upright posture  Ambulation  Ambulation?: Yes  Ambulation 1  Surface: level tile  Device: Rolling Walker  Assistance:  Moderate assistance;2 Person assistance  Quality of Gait: unsteady with narrow STANTON, posterior lean with cues for upright posture and correct use of walker  Distance: 3' to chair  Comments: pt refusing to ambulate farther     Balance  Comments: Standing balance at walker mod A x2  d/t posterior lean - required mod/max A when pulling up pants    AM-PAC Score  AM-PAC Inpatient Mobility Raw Score : 11 (03/28/20 1217)  AM-PAC Inpatient T-Scale Score : 33.86 (03/28/20 1217)  Mobility Inpatient CMS 0-100% Score: 72.57 (03/28/20 1217)  Mobility Inpatient CMS G-Code Modifier : CL (03/28/20 1217)        Goals  Short term goals  Time Frame for Short term goals: 3/25/20 unless noted  Short term goal 1: Pt will perform bed mobility with CGA by

## 2020-03-28 NOTE — CONSULTS
pressure/discomfort, edema, syncope  GASTROINTESTINAL: negative for nausea, vomiting, diarrhea, constipation, blood in stool and abdominal pain  GENITOURINARY: negative for frequency, dysuria, urinary incontinence, decreased urine volume, and hematuria  HEMATOLOGIC/LYMPHATIC: negative for easy bruising, bleeding and lymphadenopathy  ALLERGIC/IMMUNOLOGIC: negative for recurrent infections, angioedema, anaphylaxis and drug reactions  ENDOCRINE: negative for weight changes and diabetic symptoms including polyuria, polydipsia and polyphagia  MUSCULOSKELETAL: negative for pain, joint swelling, decreased range of motion and muscle weakness  NEUROLOGICAL: negative for headaches, slurred speech, unilateral weakness  PSYCHIATRIC/BEHAVIORAL: negative for hallucinations, behavioral problems, confusion and agitation.      Physical Examination:  Vitals:   Patient Vitals for the past 24 hrs:   BP Temp Temp src Pulse Resp SpO2 Weight   03/28/20 0820 (!) 173/74 98.4 °F (36.9 °C) Oral 69 16 94 % --   03/28/20 0453 (!) 164/82 98.3 °F (36.8 °C) Oral 75 18 93 % --   03/28/20 0415 -- -- -- -- -- -- 152 lb 12.8 oz (69.3 kg)   03/28/20 0400 -- -- -- 73 -- -- --   03/28/20 0200 -- -- -- 70 -- -- --   03/28/20 0114 (!) 142/71 98.2 °F (36.8 °C) Axillary 71 18 94 % --   03/28/20 0000 -- -- -- 63 -- -- --   03/27/20 2200 -- -- -- 62 -- -- --   03/27/20 2000 -- -- -- 64 -- -- --   03/27/20 1953 (!) 161/95 98.5 °F (36.9 °C) Oral 63 18 94 % --   03/27/20 1600 (!) 156/77 98.7 °F (37.1 °C) -- 56 -- -- --   03/27/20 1155 (!) 166/72 -- -- -- -- -- --   03/27/20 1130 -- -- -- 61 -- -- --   03/27/20 1115 (!) 158/70 98.9 °F (37.2 °C) -- 57 -- 97 % --   03/27/20 1058 (!) 152/74 98.5 °F (36.9 °C) -- 67 -- -- --   03/27/20 1037 (!) 150/72 98.8 °F (37.1 °C) -- -- -- -- --   03/27/20 1016 (!) 144/65 98.9 °F (37.2 °C) -- -- -- 98 % --     Mood: Stable  Const: No distress  ENT: Oral mucosa moist  Eyes: No discharge or injection  CV: Regular rate and rhythm, no murmur rub or gallop noted  Resp: Lungs clear to auscultation bilaterally, no rales wheezes or ronchi  GI: Soft, nontender, nondistended. Normal bowel sounds. Neuro: Alert, oriented, appropriate. No cranial nerve deficits appreciated. Skin: No lesions or rashes noted. MSK: No joint abnormalities noted. Lab Results   Component Value Date    WBC 8.9 03/28/2020    HGB 9.6 (L) 03/28/2020    HCT 27.4 (L) 03/28/2020    MCV 86.1 03/28/2020     (L) 03/28/2020     Lab Results   Component Value Date    INR 1.02 03/17/2020    PROTIME 11.8 03/17/2020     Lab Results   Component Value Date    CREATININE <0.5 (L) 03/28/2020    BUN 12 03/28/2020     03/28/2020    K 3.3 (L) 03/28/2020     (H) 03/28/2020    CO2 19 (L) 03/28/2020     Lab Results   Component Value Date    ALT 27 03/17/2020    AST 77 (H) 03/17/2020    GGT 21 09/09/2014    ALKPHOS 104 03/17/2020    BILITOT 0.7 03/17/2020     CT Head WO Contrast   Final Result   Somewhat limited by motion artifact. No intracranial bleed.       Contusion along the posterior right skull.           CT Cervical Spine WO Contrast   Final Result   No acute abnormality of the cervical spine.           XR KNEE RIGHT (1-2 VIEWS)   Final Result   No acute finding of the bilateral elbows, bilateral knees or left   tibia/fibula.           XR KNEE LEFT (1-2 VIEWS)   Final Result   No acute finding of the bilateral elbows, bilateral knees or left   tibia/fibula.           XR TIBIA FIBULA LEFT (2 VIEWS)   Final Result   No acute finding of the bilateral elbows, bilateral knees or left   tibia/fibula.           XR ELBOW RIGHT (MIN 3 VIEWS)   Final Result   No acute finding of the bilateral elbows, bilateral knees or left   tibia/fibula.           XR ELBOW LEFT (MIN 3 VIEWS)   Final Result   No acute finding of the bilateral elbows, bilateral knees or left   tibia/fibula.           XR CHEST PORTABLE   Final Result   No significant findings or interval change. Assessment:  1. Rhabdo   2. DENAE   3. UTI   4. GI bleed     Recommendations:  Discussed rehab options with patient; she would prefer a slower paced program rather than intensive inpatient rehab. Will not plan for ARU. Wenceslao Gaviria MD 3/28/2020, 10:13 AM

## 2020-03-29 ENCOUNTER — APPOINTMENT (OUTPATIENT)
Dept: GENERAL RADIOLOGY | Age: 75
DRG: 682 | End: 2020-03-29
Payer: MEDICARE

## 2020-03-29 LAB
ANION GAP SERPL CALCULATED.3IONS-SCNC: 10 MMOL/L (ref 3–16)
BASOPHILS ABSOLUTE: 0 K/UL (ref 0–0.2)
BASOPHILS RELATIVE PERCENT: 0.4 %
BUN BLDV-MCNC: 10 MG/DL (ref 7–20)
CALCIUM SERPL-MCNC: 7.6 MG/DL (ref 8.3–10.6)
CHLORIDE BLD-SCNC: 112 MMOL/L (ref 99–110)
CO2: 19 MMOL/L (ref 21–32)
CREAT SERPL-MCNC: <0.5 MG/DL (ref 0.6–1.2)
EOSINOPHILS ABSOLUTE: 0 K/UL (ref 0–0.6)
EOSINOPHILS RELATIVE PERCENT: 0 %
GFR AFRICAN AMERICAN: >60
GFR NON-AFRICAN AMERICAN: >60
GLUCOSE BLD-MCNC: 117 MG/DL (ref 70–99)
HCT VFR BLD CALC: 28.2 % (ref 36–48)
HEMOGLOBIN: 9.5 G/DL (ref 12–16)
LYMPHOCYTES ABSOLUTE: 0.5 K/UL (ref 1–5.1)
LYMPHOCYTES RELATIVE PERCENT: 7.5 %
MCH RBC QN AUTO: 29.5 PG (ref 26–34)
MCHC RBC AUTO-ENTMCNC: 33.9 G/DL (ref 31–36)
MCV RBC AUTO: 87.1 FL (ref 80–100)
MONOCYTES ABSOLUTE: 0.5 K/UL (ref 0–1.3)
MONOCYTES RELATIVE PERCENT: 6.5 %
NEUTROPHILS ABSOLUTE: 6.2 K/UL (ref 1.7–7.7)
NEUTROPHILS RELATIVE PERCENT: 85.6 %
PDW BLD-RTO: 19 % (ref 12.4–15.4)
PLATELET # BLD: 140 K/UL (ref 135–450)
PMV BLD AUTO: 7.7 FL (ref 5–10.5)
POTASSIUM SERPL-SCNC: 3.9 MMOL/L (ref 3.5–5.1)
RBC # BLD: 3.23 M/UL (ref 4–5.2)
SODIUM BLD-SCNC: 141 MMOL/L (ref 136–145)
WBC # BLD: 7.2 K/UL (ref 4–11)

## 2020-03-29 PROCEDURE — 6360000002 HC RX W HCPCS: Performed by: INTERNAL MEDICINE

## 2020-03-29 PROCEDURE — 80048 BASIC METABOLIC PNL TOTAL CA: CPT

## 2020-03-29 PROCEDURE — 97110 THERAPEUTIC EXERCISES: CPT

## 2020-03-29 PROCEDURE — 85025 COMPLETE CBC W/AUTO DIFF WBC: CPT

## 2020-03-29 PROCEDURE — 6370000000 HC RX 637 (ALT 250 FOR IP): Performed by: NURSE PRACTITIONER

## 2020-03-29 PROCEDURE — 6370000000 HC RX 637 (ALT 250 FOR IP): Performed by: INTERNAL MEDICINE

## 2020-03-29 PROCEDURE — 6360000002 HC RX W HCPCS: Performed by: HOSPITALIST

## 2020-03-29 PROCEDURE — 2580000003 HC RX 258: Performed by: HOSPITALIST

## 2020-03-29 PROCEDURE — 97530 THERAPEUTIC ACTIVITIES: CPT

## 2020-03-29 PROCEDURE — 71045 X-RAY EXAM CHEST 1 VIEW: CPT

## 2020-03-29 PROCEDURE — 6370000000 HC RX 637 (ALT 250 FOR IP): Performed by: HOSPITALIST

## 2020-03-29 PROCEDURE — 36415 COLL VENOUS BLD VENIPUNCTURE: CPT

## 2020-03-29 PROCEDURE — 2580000003 HC RX 258: Performed by: INTERNAL MEDICINE

## 2020-03-29 PROCEDURE — 2060000000 HC ICU INTERMEDIATE R&B

## 2020-03-29 RX ORDER — FUROSEMIDE 10 MG/ML
20 INJECTION INTRAMUSCULAR; INTRAVENOUS ONCE
Status: COMPLETED | OUTPATIENT
Start: 2020-03-29 | End: 2020-03-29

## 2020-03-29 RX ORDER — POTASSIUM CHLORIDE 20 MEQ/1
40 TABLET, EXTENDED RELEASE ORAL ONCE
Status: COMPLETED | OUTPATIENT
Start: 2020-03-29 | End: 2020-03-29

## 2020-03-29 RX ADMIN — POTASSIUM CHLORIDE 40 MEQ: 1500 TABLET, EXTENDED RELEASE ORAL at 20:42

## 2020-03-29 RX ADMIN — HEPARIN SODIUM 5000 UNITS: 5000 INJECTION INTRAVENOUS; SUBCUTANEOUS at 14:15

## 2020-03-29 RX ADMIN — ATENOLOL 50 MG: 25 TABLET ORAL at 08:17

## 2020-03-29 RX ADMIN — HEPARIN SODIUM 5000 UNITS: 5000 INJECTION INTRAVENOUS; SUBCUTANEOUS at 19:54

## 2020-03-29 RX ADMIN — FUROSEMIDE 20 MG: 10 INJECTION, SOLUTION INTRAMUSCULAR; INTRAVENOUS at 20:42

## 2020-03-29 RX ADMIN — PANTOPRAZOLE SODIUM 40 MG: 40 TABLET, DELAYED RELEASE ORAL at 06:20

## 2020-03-29 RX ADMIN — Medication 10 ML: at 08:18

## 2020-03-29 RX ADMIN — HEPARIN SODIUM 5000 UNITS: 5000 INJECTION INTRAVENOUS; SUBCUTANEOUS at 06:20

## 2020-03-29 RX ADMIN — Medication 10 ML: at 19:57

## 2020-03-29 RX ADMIN — IRON SUCROSE 100 MG: 20 INJECTION, SOLUTION INTRAVENOUS at 15:45

## 2020-03-29 RX ADMIN — ATORVASTATIN CALCIUM 80 MG: 80 TABLET, FILM COATED ORAL at 08:17

## 2020-03-29 RX ADMIN — CLOPIDOGREL BISULFATE 75 MG: 75 TABLET ORAL at 08:18

## 2020-03-29 ASSESSMENT — PAIN SCALES - PAIN ASSESSMENT IN ADVANCED DEMENTIA (PAINAD)
TOTALSCORE: 0
FACIALEXPRESSION: 0
FACIALEXPRESSION: 0
TOTALSCORE: 0
BODYLANGUAGE: 0
BODYLANGUAGE: 0
CONSOLABILITY: 0
CONSOLABILITY: 0
NEGVOCALIZATION: 0
TOTALSCORE: 0
BODYLANGUAGE: 0
TOTALSCORE: 0
BREATHING: 0
FACIALEXPRESSION: 0
FACIALEXPRESSION: 0
BREATHING: 0
NEGVOCALIZATION: 0
NEGVOCALIZATION: 0
CONSOLABILITY: 0
BREATHING: 0
NEGVOCALIZATION: 0
BODYLANGUAGE: 0
NEGVOCALIZATION: 0
BREATHING: 0
FACIALEXPRESSION: 0
CONSOLABILITY: 0
TOTALSCORE: 0
FACIALEXPRESSION: 0
BODYLANGUAGE: 0
NEGVOCALIZATION: 0
CONSOLABILITY: 0
BREATHING: 0
NEGVOCALIZATION: 0
BODYLANGUAGE: 0
BODYLANGUAGE: 0
TOTALSCORE: 0
CONSOLABILITY: 0
CONSOLABILITY: 0
BODYLANGUAGE: 0
NEGVOCALIZATION: 0
TOTALSCORE: 0
TOTALSCORE: 0
BREATHING: 0
FACIALEXPRESSION: 0
BREATHING: 0
FACIALEXPRESSION: 0
NEGVOCALIZATION: 0
BODYLANGUAGE: 0
BREATHING: 0
FACIALEXPRESSION: 0
TOTALSCORE: 0
NEGVOCALIZATION: 0
TOTALSCORE: 0
BREATHING: 0
BREATHING: 0
BODYLANGUAGE: 0
CONSOLABILITY: 0
TOTALSCORE: 0
TOTALSCORE: 0
CONSOLABILITY: 0
NEGVOCALIZATION: 0
BODYLANGUAGE: 0
FACIALEXPRESSION: 0
NEGVOCALIZATION: 0
FACIALEXPRESSION: 0
BODYLANGUAGE: 0
CONSOLABILITY: 0
TOTALSCORE: 0
BREATHING: 0
CONSOLABILITY: 0
BREATHING: 0
CONSOLABILITY: 0
CONSOLABILITY: 0
NEGVOCALIZATION: 0
BODYLANGUAGE: 0
BREATHING: 0

## 2020-03-29 ASSESSMENT — PAIN SCALES - GENERAL
PAINLEVEL_OUTOF10: 0
PAINLEVEL_OUTOF10: 2
PAINLEVEL_OUTOF10: 0

## 2020-03-29 NOTE — PROGRESS NOTES
Spoke with Dr Sarah aMki regarding increased shortness of breath and edema  And room air sao3 96 % orders noted.

## 2020-03-29 NOTE — PLAN OF CARE
frequency  Description: Decrease in sensory misperception frequency  Outcome: Ongoing  Goal: Able to refrain from responding to false sensory perceptions  Description: Able to refrain from responding to false sensory perceptions  Outcome: Ongoing  Goal: Demonstrates accurate environmental perceptions  Description: Demonstrates accurate environmental perceptions  Outcome: Ongoing  Goal: Able to distinguish between reality-based and nonreality-based thinking  Description: Able to distinguish between reality-based and nonreality-based thinking  Outcome: Ongoing  Goal: Able to interrupt nonreality-based thinking  Description: Able to interrupt nonreality-based thinking  Outcome: Ongoing     Problem: Sleep Pattern Disturbance:  Goal: Appears well-rested  Description: Appears well-rested  Outcome: Ongoing     Problem: Bleeding:  Goal: Will show no signs and symptoms of excessive bleeding  Description: Will show no signs and symptoms of excessive bleeding  Outcome: Ongoing

## 2020-03-29 NOTE — PROGRESS NOTES
Assistance  Comment: Pt completes x 2 partial sit to stand from EOB with PT in front of pt and Celia to modA x 1 to complete, cues for hand placement and sequence.  Pt requires maximal encouragement and eduaction to participate, only agreeable to be able to scoot towards HOB to improve positioning  Ambulation  Ambulation?: No(Pt adamantly declines despite education and encouragement)        Balance  Posture: Fair  Sitting - Static: Good;-  Sitting - Dynamic: Fair;+  Standing - Static: Poor;+     Exercises  Straight Leg Raise: Supine BLE x 10 (Celia)  Heelslides: Supine BLE x 12  Gluteal Sets: Supine BLE x 12  Hip Abduction: Supine BLE x 12  Knee Short Arc Quad: Supine BLE x 12  Ankle Pumps: Supine BLE x 12  Comments: Cues for sequence and technique  Other exercises  Other exercises?: Yes  Other exercises 1: PT completed BLE supine passive calf stretch x 2 minute hold each to improve DF ROM for improved performance with t/f and gait activities                          AM-PAC Score  AM-PAC Inpatient Mobility Raw Score : 11 (03/29/20 1220)  AM-PAC Inpatient T-Scale Score : 33.86 (03/29/20 1220)  Mobility Inpatient CMS 0-100% Score: 72.57 (03/29/20 1220)  Mobility Inpatient CMS G-Code Modifier : CL (03/29/20 1220)          Goals  Short term goals  Time Frame for Short term goals: 3/25/20 unless noted, date extended d/t increased LOS goals to be met by 4/5/20  Short term goal 1: Pt will perform bed mobility with CGA by 3/24/20 GOAL MET 3/29: CGA with HOB elevated and use of BR  Short term goal 2: Pt will perform transfers with Celia x 1; Celia for sit<>stand 3/29: modA x 1-2  Short term goal 3: Pt will ambulate 15 ft with RW and Celia x 1-2 for safety --3/24 RW 15 Min A x2; 3/29: Pt adamantly declined gait  Patient Goals   Patient goals : \"to go home\"    Plan    Plan  Times per week: 3-5  Times per day: Daily  Specific instructions for Next Treatment: Progress mobility as tolerated  Current Treatment Recommendations:

## 2020-03-29 NOTE — PROGRESS NOTES
midline. Respiratory:  Normal respiratory effort. Clear to auscultation, bilaterally without Rales/Wheezes/Rhonchi. Cardiovascular: Regular rate and rhythm with normal S1/S2 without murmurs, rubs or gallops. Abdomen: Soft, non-tender, non-distended with normal bowel sounds. Musculoskeletal: No clubbing, cyanosis or edema bilaterally. Full range of motion without deformity. Skin: Phimosis around right thigh multiple bruising on lower extremity. Neurologic:  Neurovascularly intact without any focal sensory/motor deficits. Cranial nerves: II-XII intact, grossly non-focal.  Psychiatric: Alert and oriented, thought content appropriate, normal insight  Capillary Refill: Brisk,< 3 seconds   Peripheral Pulses: +2 palpable, equal bilaterally       Labs:   Recent Labs     03/27/20  0452 03/27/20  1643 03/28/20  0507 03/29/20  0508   WBC 4.8  --  8.9 7.2   HGB 6.9* 8.9* 9.6* 9.5*   HCT 20.2* 26.0* 27.4* 28.2*   *  --  130* 140     Recent Labs     03/27/20  0452 03/27/20  1038 03/28/20  0507 03/29/20  0508   *  --  144 141   K 2.9* 2.9* 3.3* 3.9   *  --  114* 112*   CO2 19*  --  19* 19*   BUN 17  --  12 10   CREATININE <0.5*  --  <0.5* <0.5*   CALCIUM 7.7*  --  7.5* 7.6*     No results for input(s): AST, ALT, BILIDIR, BILITOT, ALKPHOS in the last 72 hours. No results for input(s): INR in the last 72 hours. No results for input(s): Indira Medici in the last 72 hours. Urinalysis:      Lab Results   Component Value Date    NITRU Negative 03/17/2020    WBCUA see below 03/17/2020    BACTERIA 3+ 03/17/2020    RBCUA 3-4 03/17/2020    BLOODU LARGE 03/17/2020    SPECGRAV 1.025 03/17/2020    GLUCOSEU Negative 03/17/2020       Radiology:  CT Head WO Contrast   Final Result   Somewhat limited by motion artifact. No intracranial bleed. Contusion along the posterior right skull. CT Cervical Spine WO Contrast   Final Result   No acute abnormality of the cervical spine.          XR KNEE RIGHT (1-2 VIEWS)   Final Result   No acute finding of the bilateral elbows, bilateral knees or left   tibia/fibula. XR KNEE LEFT (1-2 VIEWS)   Final Result   No acute finding of the bilateral elbows, bilateral knees or left   tibia/fibula. XR TIBIA FIBULA LEFT (2 VIEWS)   Final Result   No acute finding of the bilateral elbows, bilateral knees or left   tibia/fibula. XR ELBOW RIGHT (MIN 3 VIEWS)   Final Result   No acute finding of the bilateral elbows, bilateral knees or left   tibia/fibula. XR ELBOW LEFT (MIN 3 VIEWS)   Final Result   No acute finding of the bilateral elbows, bilateral knees or left   tibia/fibula. XR CHEST PORTABLE   Final Result   No significant findings or interval change. Assessment/Plan:    Active Hospital Problems    Diagnosis    DENAE (acute kidney injury) ([facility] [address] [N17.9]     1. This 60-year-old female admitted with acute kidney injury, rhabdomyolysis improved with IV hydration nephrology consulted and following acute kidney  Injury has resolved. 2. UTI on IV Rocephin urine culture ID and sensitivity pending. 3.  Anemia status post 3 packed RBC transfusion  GI consulted and has EGD done on 3/26/20  Esophagus and stomach, duodenum normal recommended to resume Plavix and aspirin if significant bleeding plan for colonoscopy. Acute blood loss anemia status post 3 packed RBC transfusion started on IV Venofer on 3/28/20  4. Elevated troponin I in the setting of rhabdomyolysis and acute kidney injury. 5.  Hypernatremia improved with IV hydration  6. Hypokalemia status post supplement resolved. 7.  Debility consult physical Occupational Therapy will need nursing home placement. notified by social service patient has a placement possible discharge tomorrow. 8.  Later today chest x-ray reviewed will give 1 dose of Lasix 20 mg IV ×1, potassium supplement and repeat BMP in a.m.  IV fluid was discontinued.       DVT Prophylaxis: Subcu heparin  Diet: DIET GENERAL;  Code Status: Full Code    PT/OT Eval Status: Rc Canas MD

## 2020-03-29 NOTE — PROGRESS NOTES
Ciera Bermeo is a 69 y.o. female patient.     Current Facility-Administered Medications   Medication Dose Route Frequency Provider Last Rate Last Dose    iron sucrose (VENOFER) 100 mg in sodium chloride 0.9 % 100 mL IVPB  100 mg Intravenous Q24H Mandeep Ford MD   Stopped at 03/28/20 2058    0.9 % sodium chloride bolus  20 mL Intravenous Once DAVE Burkett CNP   Stopped at 03/27/20 1120    dextrose 5 % and 0.45 % NaCl with KCl 20 mEq infusion   Intravenous Continuous Maria Eugenia Stephenson MD 50 mL/hr at 03/28/20 1107      pantoprazole (PROTONIX) tablet 40 mg  40 mg Oral QAM AC Maria Eugenia Stephenson MD   40 mg at 03/29/20 0544    atenolol (TENORMIN) tablet 50 mg  50 mg Oral Daily DAVE Burkett CNP   50 mg at 03/29/20 0817    0.9 % sodium chloride bolus  20 mL Intravenous Once Maria Eugenia Stephenson MD        polyethylene glycol (GLYCOLAX) powder 238 g  238 g Oral Once Emma Brock DO        atorvastatin (LIPITOR) tablet 80 mg  80 mg Oral Daily Kim Heath MD   80 mg at 03/29/20 0817    azelastine (OPTIVAR) 0.05 % ophthalmic solution 1 drop  1 drop Both Eyes BID Kim Heath MD        clopidogrel (PLAVIX) tablet 75 mg  75 mg Oral Daily Kim Heath MD   75 mg at 03/29/20 0818    sodium chloride flush 0.9 % injection 10 mL  10 mL Intravenous 2 times per day Kim Heath MD   10 mL at 03/29/20 0818    sodium chloride flush 0.9 % injection 10 mL  10 mL Intravenous PRN Kim Heath MD   10 mL at 03/23/20 2250    acetaminophen (TYLENOL) tablet 650 mg  650 mg Oral Q6H PRN Kim Heath MD   650 mg at 03/22/20 1124    Or    acetaminophen (TYLENOL) suppository 650 mg  650 mg Rectal Q6H PRN Kim Heath MD        promethazine (PHENERGAN) tablet 12.5 mg  12.5 mg Oral Q6H PRN Kim Heath MD        Or    ondansetron [facility]) injection 4 mg  4 mg Intravenous Q6H PRN Kim Heath MD   4 mg at 03/22/20 1124    heparin (porcine) injection 5,000 Units  5,000 Units Subcutaneous 3 times

## 2020-03-30 VITALS
WEIGHT: 158.51 LBS | SYSTOLIC BLOOD PRESSURE: 149 MMHG | HEIGHT: 63 IN | BODY MASS INDEX: 28.09 KG/M2 | RESPIRATION RATE: 15 BRPM | TEMPERATURE: 97.9 F | DIASTOLIC BLOOD PRESSURE: 81 MMHG | HEART RATE: 70 BPM | OXYGEN SATURATION: 95 %

## 2020-03-30 LAB
ANION GAP SERPL CALCULATED.3IONS-SCNC: 9 MMOL/L (ref 3–16)
BASOPHILS ABSOLUTE: 0 K/UL (ref 0–0.2)
BASOPHILS RELATIVE PERCENT: 0.5 %
BUN BLDV-MCNC: 13 MG/DL (ref 7–20)
CALCIUM SERPL-MCNC: 8.1 MG/DL (ref 8.3–10.6)
CHLORIDE BLD-SCNC: 111 MMOL/L (ref 99–110)
CO2: 21 MMOL/L (ref 21–32)
CREAT SERPL-MCNC: <0.5 MG/DL (ref 0.6–1.2)
EOSINOPHILS ABSOLUTE: 0 K/UL (ref 0–0.6)
EOSINOPHILS RELATIVE PERCENT: 0.1 %
GFR AFRICAN AMERICAN: >60
GFR NON-AFRICAN AMERICAN: >60
GLUCOSE BLD-MCNC: 111 MG/DL (ref 70–99)
HCT VFR BLD CALC: 27.9 % (ref 36–48)
HEMOGLOBIN: 9.6 G/DL (ref 12–16)
LYMPHOCYTES ABSOLUTE: 0.7 K/UL (ref 1–5.1)
LYMPHOCYTES RELATIVE PERCENT: 11.4 %
MCH RBC QN AUTO: 30.1 PG (ref 26–34)
MCHC RBC AUTO-ENTMCNC: 34.5 G/DL (ref 31–36)
MCV RBC AUTO: 87 FL (ref 80–100)
MONOCYTES ABSOLUTE: 0.5 K/UL (ref 0–1.3)
MONOCYTES RELATIVE PERCENT: 8.7 %
NEUTROPHILS ABSOLUTE: 4.9 K/UL (ref 1.7–7.7)
NEUTROPHILS RELATIVE PERCENT: 79.3 %
PDW BLD-RTO: 19.8 % (ref 12.4–15.4)
PLATELET # BLD: 150 K/UL (ref 135–450)
PMV BLD AUTO: 8.3 FL (ref 5–10.5)
POTASSIUM SERPL-SCNC: 4.1 MMOL/L (ref 3.5–5.1)
RBC # BLD: 3.21 M/UL (ref 4–5.2)
SODIUM BLD-SCNC: 141 MMOL/L (ref 136–145)
WBC # BLD: 6.2 K/UL (ref 4–11)

## 2020-03-30 PROCEDURE — 85025 COMPLETE CBC W/AUTO DIFF WBC: CPT

## 2020-03-30 PROCEDURE — 97110 THERAPEUTIC EXERCISES: CPT

## 2020-03-30 PROCEDURE — 6370000000 HC RX 637 (ALT 250 FOR IP): Performed by: INTERNAL MEDICINE

## 2020-03-30 PROCEDURE — 6360000002 HC RX W HCPCS: Performed by: HOSPITALIST

## 2020-03-30 PROCEDURE — 6360000002 HC RX W HCPCS: Performed by: INTERNAL MEDICINE

## 2020-03-30 PROCEDURE — 2580000003 HC RX 258: Performed by: INTERNAL MEDICINE

## 2020-03-30 PROCEDURE — 6370000000 HC RX 637 (ALT 250 FOR IP): Performed by: NURSE PRACTITIONER

## 2020-03-30 PROCEDURE — 97530 THERAPEUTIC ACTIVITIES: CPT

## 2020-03-30 PROCEDURE — 80048 BASIC METABOLIC PNL TOTAL CA: CPT

## 2020-03-30 PROCEDURE — 2580000003 HC RX 258: Performed by: HOSPITALIST

## 2020-03-30 PROCEDURE — 36415 COLL VENOUS BLD VENIPUNCTURE: CPT

## 2020-03-30 RX ORDER — FERROUS SULFATE 325(65) MG
325 TABLET ORAL 2 TIMES DAILY
Qty: 180 TABLET | Refills: 1
Start: 2020-03-30

## 2020-03-30 RX ORDER — LORAZEPAM 0.5 MG/1
0.5 TABLET ORAL EVERY 8 HOURS PRN
Qty: 10 TABLET | Refills: 0 | Status: SHIPPED | OUTPATIENT
Start: 2020-03-30 | End: 2020-04-09

## 2020-03-30 RX ORDER — LORAZEPAM 0.5 MG/1
0.5 TABLET ORAL NIGHTLY
Status: DISCONTINUED | OUTPATIENT
Start: 2020-03-30 | End: 2020-03-30 | Stop reason: HOSPADM

## 2020-03-30 RX ADMIN — HEPARIN SODIUM 5000 UNITS: 5000 INJECTION INTRAVENOUS; SUBCUTANEOUS at 06:38

## 2020-03-30 RX ADMIN — ATORVASTATIN CALCIUM 80 MG: 80 TABLET, FILM COATED ORAL at 08:47

## 2020-03-30 RX ADMIN — ATENOLOL 50 MG: 25 TABLET ORAL at 08:47

## 2020-03-30 RX ADMIN — PANTOPRAZOLE SODIUM 40 MG: 40 TABLET, DELAYED RELEASE ORAL at 08:48

## 2020-03-30 RX ADMIN — PANTOPRAZOLE SODIUM 40 MG: 40 TABLET, DELAYED RELEASE ORAL at 06:38

## 2020-03-30 RX ADMIN — CLOPIDOGREL BISULFATE 75 MG: 75 TABLET ORAL at 08:47

## 2020-03-30 RX ADMIN — HEPARIN SODIUM 5000 UNITS: 5000 INJECTION INTRAVENOUS; SUBCUTANEOUS at 14:26

## 2020-03-30 RX ADMIN — Medication 10 ML: at 08:48

## 2020-03-30 RX ADMIN — IRON SUCROSE 100 MG: 20 INJECTION, SOLUTION INTRAVENOUS at 14:26

## 2020-03-30 ASSESSMENT — PAIN SCALES - PAIN ASSESSMENT IN ADVANCED DEMENTIA (PAINAD)
FACIALEXPRESSION: 0
NEGVOCALIZATION: 0
CONSOLABILITY: 0
NEGVOCALIZATION: 0
BREATHING: 0
TOTALSCORE: 0
BREATHING: 0
FACIALEXPRESSION: 0
CONSOLABILITY: 0
TOTALSCORE: 0
FACIALEXPRESSION: 0
BODYLANGUAGE: 0
NEGVOCALIZATION: 0
TOTALSCORE: 0
BREATHING: 0
FACIALEXPRESSION: 0
BREATHING: 0
TOTALSCORE: 0
NEGVOCALIZATION: 0
BODYLANGUAGE: 0
BREATHING: 0
CONSOLABILITY: 0
FACIALEXPRESSION: 0
FACIALEXPRESSION: 0
TOTALSCORE: 0
BODYLANGUAGE: 0
BODYLANGUAGE: 0
NEGVOCALIZATION: 0
FACIALEXPRESSION: 0
BREATHING: 0
FACIALEXPRESSION: 0
NEGVOCALIZATION: 0
TOTALSCORE: 0
BODYLANGUAGE: 0
FACIALEXPRESSION: 0
TOTALSCORE: 0
BREATHING: 0
TOTALSCORE: 0
BODYLANGUAGE: 0
CONSOLABILITY: 0
NEGVOCALIZATION: 0
CONSOLABILITY: 0
BREATHING: 0
TOTALSCORE: 0
BREATHING: 0
BODYLANGUAGE: 0
BODYLANGUAGE: 0
CONSOLABILITY: 0
BODYLANGUAGE: 0

## 2020-03-30 ASSESSMENT — PAIN SCALES - GENERAL
PAINLEVEL_OUTOF10: 0

## 2020-03-30 NOTE — PROGRESS NOTES
Nutrition Assessment    Type and Reason for Visit: Reassess    Nutrition Recommendations:   1. Continue general diet   2. Encourage PO intakes   3. Monitor nutrition adequacy, pertinent labs, bowel habits, wt changes, and clinical progress    Nutrition Assessment: Follow up: Pt nutritionally declining AEB decreased PO intakes 0-100%. EDG 3/26 normal findings, GI recs OP colonoscopy. DENAE resolved per MD. H & H trending up. Pt reports good appetite, but PO intakes low. Pt refused ONS. Encouraged PO intakes. Will continue to monitor. Malnutrition Assessment:  · Malnutrition Status: At risk for malnutrition  · Context: Acute illness or injury  · Findings of the 6 clinical characteristics of malnutrition (Minimum of 2 out of 6 clinical characteristics is required to make the diagnosis of moderate or severe Protein Calorie Malnutrition based on AND/ASPEN Guidelines):  1. Energy Intake-Less than or equal to 75% of estimated energy requirement, Greater than or equal to 5 days    2. Weight Loss-Unable to assess,    3. Fat Loss-Unable to assess,    4. Muscle Loss-Unable to assess,    5. Fluid Accumulation-Mild fluid accumulation, Extremities  6.  Strength-Not measured    Nutrition Risk Level:  Moderate    Nutrient Needs:  · Estimated Daily Total Kcal: 5082-6763  · Estimated Daily Protein (g): 79-99  · Estimated Daily Total Fluid (ml/day): 1 kcal/ml    Nutrition Diagnosis:   · Problem: Inadequate oral intake  · Etiology: related to Insufficient energy/nutrient consumption     Signs and symptoms:  as evidenced by Diet history of poor intake    Objective Information:  · Nutrition-Focused Physical Findings: BM x1 on 3/29  · Wound Type: (redness elizabeth area; scattered abrasions)  · Current Nutrition Therapies:  · Oral Diet Orders: General   · Oral Diet intake: 0%, 1-25%, 26-50%, 51-75%, %  · Oral Nutrition Supplement (ONS) Orders: None  · Anthropometric Measures:  · Ht: 5' 3\" (160 cm)   · Current Body Wt: 158 lb (71.7 kg)([facility])  · % Weight Change:  ,  +8.8 L   · Ideal Body Wt: 115 lb (52.2 kg),   · BMI Classification: BMI 25.0 - 29.9 Overweight    Nutrition Interventions:   Continue current diet  Continued Inpatient Monitoring    Nutrition Evaluation:   · Evaluation: Progress towards goals declining   · Goals: Patient will continue to eat 50% or greater of meals.     · Monitoring: Weight, Nutrition Progression, Pertinent Labs      Electronically signed by Rodegr Do MS, RD, LD on 3/30/20 at 11:24 AM EDT    Contact Number: Office: [phone]; [address]: [id number]

## 2020-03-30 NOTE — PROGRESS NOTES
PROGRESS NOTE  S:74 yrs Patient  admitted on 3/17/2020 with [facility]NA[facility] [N17.9]  DENAE (acute kidney injury) [facility] [N17.9] . Planning discharge today. No active bleeding. Patient states never had colonoscopy, more willing to try as outpatient. Current Hospital Schedued Meds   LORazepam  0.5 mg Oral Nightly    iron sucrose (VENOFER) iv piggyback 100 mL (Admin over 60 minutes)  100 mg Intravenous Q24H    sodium chloride  20 mL Intravenous Once    pantoprazole  40 mg Oral QAM AC    atenolol  50 mg Oral Daily    sodium chloride  20 mL Intravenous Once    polyethylene glycol  238 g Oral Once    atorvastatin  80 mg Oral Daily    azelastine  1 drop Both Eyes BID    clopidogrel  75 mg Oral Daily    sodium chloride flush  10 mL Intravenous 2 times per day    heparin (porcine)  5,000 Units Subcutaneous 3 times per day     Current Sveltekrogen 55 PRN Meds  sodium chloride flush, acetaminophen **OR** acetaminophen, promethazine **OR** ondansetron    Exam:   Vitals:    03/30/20 1426   BP: (!) 149/81   Pulse: 70   Resp: 15   Temp: 97.9 °F (36.6 °C)   SpO2: 95%     I/O last 3 completed shifts:   In: 200 [P.O.:180; I.V.:10; IV Piggyback:100]  Out: 1200 [Urine:1200]   General appearance: alert, appears stated age and cooperative  HEENT: PERRLA  Neck: no adenopathy, no carotid bruit, no JVD, supple, symmetrical, trachea midline and thyroid not enlarged, symmetric, no tenderness/mass/nodules  Lungs: clear to auscultation bilaterally  Heart: regular rate and rhythm, S1, S2 normal, no murmur, click, rub or gallop  Abdomen: soft, non-tender; bowel sounds normal; no masses,  no organomegaly  Extremities: extremities normal, atraumatic, no cyanosis or edema     Labs:  CBC:   Recent Labs     03/28/20  0507 03/29/20  0508 03/30/20  0425   WBC 8.9 7.2 6.2   HGB 9.6* 9.5* 9.6*   HCT 27.4* 28.2* 27.9*   MCV 86.1 87.1 87.0   * 140 150     BMP:

## 2020-03-30 NOTE — PLAN OF CARE
Problem: Falls - Risk of:  Goal: Will remain free from falls  Description: Will remain free from falls  3/30/2020 0226 by Lasha Kearns RN  Outcome: Ongoing  3/29/2020 1831 by Nii Barber RN  Outcome: Ongoing  Goal: Absence of physical injury  Description: Absence of physical injury  3/30/2020 0226 by Lasha Kearns RN  Outcome: Ongoing  3/29/2020 1831 by Nii Barber RN  Outcome: Ongoing     Problem: Cardiovascular  Goal: No DVT, peripheral vascular complications  5/54/0997 0226 by Lasha Kearns RN  Outcome: Ongoing  3/29/2020 1831 by Nii Barber RN  Outcome: Ongoing  Goal: Hemodynamic stability  3/30/2020 0226 by Lasha Kearns RN  Outcome: Ongoing  3/29/2020 1831 by Nii Barber RN  Outcome: Ongoing  Goal: Anticoagulate/Hct stable  3/30/2020 0226 by Lasha Kearns RN  Outcome: Ongoing  3/29/2020 1831 by Nii Barber RN  Outcome: Ongoing  Goal: Agreement to quit smoking  3/30/2020 0226 by Lasha Kearns RN  Outcome: Ongoing  3/29/2020 1831 by Nii Barber RN  Outcome: Ongoing  Goal: Weight maintained or lost  3/30/2020 0226 by Lasha Kearns RN  Outcome: Ongoing  3/29/2020 1831 by Nii Barber RN  Outcome: Ongoing  Goal: Understanding of dietary restrictions  3/30/2020 0226 by Lasha Kearns RN  Outcome: Ongoing  3/29/2020 1831 by Nii Barber RN  Outcome: Ongoing     Problem: Respiratory  Goal: No pulmonary complications  Outcome: Ongoing  Goal: O2 Sat > 90%  Outcome: Ongoing  Goal: Supplemental O2 requirements decreased  Outcome: Ongoing  Goal: Agreement to quit smoking  Outcome: Ongoing  Goal: Pneumonia vaccine at discharge as needed  Outcome: Ongoing     Problem: Nutrition  Goal: Optimal nutrition therapy  Outcome: Ongoing  Goal: Understanding of nutritional guidelines  Outcome: Ongoing     Problem: Skin Integrity/Risk  Goal: No skin breakdown during hospitalization  Outcome: Ongoing  Goal: Wound healing  Outcome: Ongoing     Problem: Musculor/Skeletal Functional Status  Goal: Highest potential functional level  Outcome: Ongoing  Goal: Absence of falls  Outcome: Ongoing     Problem: Safety:  Goal: Free from accidental physical injury  Description: Free from accidental physical injury  Outcome: Ongoing  Goal: Free from intentional harm  Description: Free from intentional harm  Outcome: Ongoing     Problem: Daily Care:  Goal: Daily care needs are met  Description: Daily care needs are met  Outcome: Ongoing     Problem: Pain:  Goal: Patient's pain/discomfort is manageable  Description: Patient's pain/discomfort is manageable  Outcome: Ongoing  Goal: Pain level will decrease  Description: Pain level will decrease  Outcome: Ongoing  Goal: Control of acute pain  Description: Control of acute pain  Outcome: Ongoing  Goal: Control of chronic pain  Description: Control of chronic pain  Outcome: Ongoing     Problem: Skin Integrity:  Goal: Skin integrity will stabilize  Description: Skin integrity will stabilize  Outcome: Ongoing     Problem: Discharge Planning:  Goal: Patients continuum of care needs are met  Description: Patients continuum of care needs are met  Outcome: Ongoing     Problem: Anxiety:  Goal: Level of anxiety will decrease  Description: Level of anxiety will decrease  Outcome: Ongoing     Problem: Confusion - Acute:  Goal: Absence of continued neurological deterioration signs and symptoms  Description: Absence of continued neurological deterioration signs and symptoms  Outcome: Ongoing  Goal: Mental status will be restored to baseline  Description: Mental status will be restored to baseline  Outcome: Ongoing     Problem: Discharge Planning:  Goal: Ability to perform activities of daily living will improve  Description: Ability to perform activities of daily living will improve  Outcome: Ongoing  Goal: Participates in care planning  Description: Participates in care planning  Outcome: Ongoing     Problem: Injury - Risk of, Physical Injury:  Goal: Will remain free from falls  Description: Will remain free from falls  3/30/2020 0226 by Sergey Jordan RN  Outcome: Ongoing  3/29/2020 1831 by David Cooley RN  Outcome: Ongoing  Goal: Absence of physical injury  Description: Absence of physical injury  3/30/2020 0226 by Sergey Jordan RN  Outcome: Ongoing  3/29/2020 1831 by David Cooley RN  Outcome: Ongoing     Problem: Mood - Altered:  Goal: Mood stable  Description: Mood stable  Outcome: Ongoing  Goal: Absence of abusive behavior  Description: Absence of abusive behavior  Outcome: Ongoing  Goal: Verbalizations of feeling emotionally comfortable while being cared for will increase  Description: Verbalizations of feeling emotionally comfortable while being cared for will increase  Outcome: Ongoing     Problem: Psychomotor Activity - Altered:  Goal: Absence of psychomotor disturbance signs and symptoms  Description: Absence of psychomotor disturbance signs and symptoms  Outcome: Ongoing     Problem: Sensory Perception - Impaired:  Goal: Demonstrations of improved sensory functioning will increase  Description: Demonstrations of improved sensory functioning will increase  Outcome: Ongoing  Goal: Decrease in sensory misperception frequency  Description: Decrease in sensory misperception frequency  Outcome: Ongoing  Goal: Able to refrain from responding to false sensory perceptions  Description: Able to refrain from responding to false sensory perceptions  Outcome: Ongoing  Goal: Demonstrates accurate environmental perceptions  Description: Demonstrates accurate environmental perceptions  Outcome: Ongoing  Goal: Able to distinguish between reality-based and nonreality-based thinking  Description: Able to distinguish between reality-based and nonreality-based thinking  Outcome: Ongoing  Goal: Able to interrupt nonreality-based thinking  Description: Able to interrupt nonreality-based thinking  Outcome: Ongoing     Problem: Sleep Pattern Disturbance:  Goal: Appears well-rested  Description: Appears well-rested  Outcome: Ongoing     Problem: Bleeding:  Goal: Will show no signs and symptoms of excessive bleeding  Description: Will show no signs and symptoms of excessive bleeding  Outcome: Ongoing

## 2020-03-30 NOTE — PROGRESS NOTES
4 Eyes Skin Assessment     The patient is being assess for   Shift Handoff    I agree that 2 RN's have performed a thorough Head to Toe Skin Assessment on the patient. ALL assessment sites listed below have been assessed. Areas assessed by both nurses:   [x]   Head, Face, and Ears   [x]   Shoulders, Back, and Chest, Abdomen  [x]   Arms, Elbows, and Hands   [x]   Coccyx, Sacrum, and Ischium  [x]   Legs, Feet, and Heels             **SHARE this note so that the co-signing nurse is able to place an eSignature**    Co-signer eSignature: Electronically signed by Richard Lauren RN on 3/30/20 at 10:47 AM EDT    Does the Patient have Skin Breakdown?   No          [facility] Prevention initiated:  Yes   Wound Care Orders initiated:  No      WOC nurse consulted for Pressure Injury (Stage 3,4, Unstageable, DTI, NWPT, Complex wounds)and New or Established Ostomies:  No      Primary Nurse eSignature: Electronically signed by Mallory Preston RN on 3/30/20 at 10:47 AM EDT

## 2020-03-30 NOTE — CARE COORDINATION
CASE MANAGEMENT DISCHARGE SUMMARY    DISCHARGE TO: [facility]    TRANSPORTATION: [facility] pm     NOTIFIED: PATIENT, facility and RN. Pt's grandson Sang Alan who will let the rest of the family know     HENS:  N-A    PRECERTIFICATION OBTAINED: N/A Medicare     REPORT: NURSE TO CALL REPORT TO: # [phone]    JOSE/AVS Faxed to facility     NURSE RESPONSIBLE FOR COMPLETION OF   E-CONTINUITY OF CARE (jose) FORM, and RECONCILIATION OF MEDICATIONS,  NURSE TO ENSURE THAT ANY WRITTEN PRESCRIPTIONS AND A COPY OF THE PATIENTS CHART TO ACCOMPANY THEM TO FACILITY if not able to access electronic chart.

## 2020-03-30 NOTE — PLAN OF CARE
Problem: Nutrition  Goal: Optimal nutrition therapy  Note: Nutrition Problem: Inadequate oral intake  Intervention: Food and/or Nutrient Delivery: Continue current diet  Nutritional Goals: Patient will continue to eat 50% or greater of meals.

## 2020-03-30 NOTE — FLOWSHEET NOTE
03/30/20 1434   Encounter Summary   Services provided to: Patient   Continue Visiting   (3/30 [employer]-no spiritual needs+hearing loss)   Complexity of Encounter Moderate   Length of Encounter 15 minutes

## 2020-03-30 NOTE — DISCHARGE SUMMARY
Diagnoses: Anxiety state, unspecified; Essential hypertension, benign; Other and unspecified hyperlipidemia; Coronary atherosclerosis of unspecified type of vessel, native or graft; Vitamin D deficiency      cycloSPORINE (RESTASIS) 0.05 % ophthalmic emulsion 1 drop 2 times daily. Associated Diagnoses: Anxiety state, unspecified; Essential hypertension, benign; Other and unspecified hyperlipidemia; Coronary atherosclerosis of unspecified type of vessel, native or graft; Vitamin D deficiency      nitroGLYCERIN (NITROSTAT) 0.4 MG SL tablet Place 0.4 mg under the tongue every 5 minutes as needed. Associated Diagnoses: Anxiety state, unspecified; Essential hypertension, benign; Other and unspecified hyperlipidemia; Coronary atherosclerosis of unspecified type of vessel, native or graft; Vitamin D deficiency      atenolol (TENORMIN) 50 MG tablet Take 50 mg by mouth daily. ezetimibe (ZETIA) 10 MG tablet Take 10 mg by mouth daily. clopidogrel (PLAVIX) 75 MG tablet Take 75 mg by mouth daily. Time Spent on discharge is more than 30 minutes in the examination, evaluation, counseling and review of medications and discharge plan. Signed:    Chelsie Berrios MD   3/30/2020      Thank you Malika Car MD for the opportunity to be involved in this patient's care. If you have any questions or concerns please feel free to contact me at [phone].

## 2020-03-30 NOTE — PROGRESS NOTES
Essential hypertension, benign, Gastritis, Osteopenia, Other and unspecified hyperlipidemia, Screening mammogram, Severe Osteopenia, and Unspecified hearing loss. has a past surgical history that includes Percutaneous Transluminal Coronary Angio; Upper gastrointestinal endoscopy (11/18/14); and Upper gastrointestinal endoscopy (N/A, 3/26/2020). Restrictions  Restrictions/Precautions  Restrictions/Precautions: General Precautions, Fall Risk, Up as Tolerated  Position Activity Restriction  Other position/activity restrictions: IV, Avasys   Subjective   General  Chart Reviewed: Yes  Response To Previous Treatment: Patient with no complaints from previous session. Family / Caregiver Present: No  Referring Practitioner: Zhang Ventura MD  Subjective  Subjective: Pt resting in bed. Resistant to activity due to fatigue but agreeable with encouragement. Confused. General Comment  Comments: RN cleared pt for session  Pain Screening  Patient Currently in Pain: Denies  Vital Signs  Patient Currently in Pain: Denies       Orientation  Orientation  Overall Orientation Status: Impaired  Orientation Level: Disoriented to situation;Oriented to person;Oriented to time  Cognition      Objective   Bed mobility  Supine to Sit: Moderate assistance  Sit to Supine: Unable to assess  Transfers  Sit to Stand: Moderate Assistance  Stand to sit: Moderate Assistance  Stand Pivot Transfers: Maximum Assistance  Ambulation  Ambulation?: Yes  More Ambulation?: No  Ambulation 1  Surface: level tile  Device: Rolling Walker  Assistance:  Moderate assistance;2 Person assistance  Quality of Gait: unsteady with narrow STANTON, posterior lean with cues for upright posture and correct use of walker  Gait Deviations: Slow Ana Maria;Decreased step length;Decreased step height  Distance: 8' x 4  Comments: improving balance with repetition, but does fatigue quickly      Balance  Posture: Fair  Sitting - Static: Good;-  Sitting - Dynamic: Fair;+  Standing - Static:

## 2020-03-30 NOTE — PLAN OF CARE
RN  Outcome: Completed  3/30/2020 0226 by Shikha Ulrich RN  Outcome: Ongoing  Goal: Participates in care planning  Description: Participates in care planning  3/30/2020 3799 by Virgie Calvin RN  Outcome: Completed  3/30/2020 0226 by Shikha Ulrich RN  Outcome: Ongoing     Problem: Injury - Risk of, Physical Injury:  Goal: Will remain free from falls  Description: Will remain free from falls  3/30/2020 5880 by Virgie Calvin RN  Outcome: Completed  3/30/2020 0226 by Shikha Ulrich RN  Outcome: Ongoing  Goal: Absence of physical injury  Description: Absence of physical injury  3/30/2020 9875 by Virgie Calvin RN  Outcome: Completed  3/30/2020 0226 by Shikha Ulrich RN  Outcome: Ongoing     Problem: Mood - Altered:  Goal: Mood stable  Description: Mood stable  3/30/2020 0197 by Virgie Calvin RN  Outcome: Completed  3/30/2020 0226 by Shikha Ulrich RN  Outcome: Ongoing  Goal: Absence of abusive behavior  Description: Absence of abusive behavior  3/30/2020 1158 by Virgie Calvin RN  Outcome: Completed  3/30/2020 0226 by Shikha Ulrich RN  Outcome: Ongoing  Goal: Verbalizations of feeling emotionally comfortable while being cared for will increase  Description: Verbalizations of feeling emotionally comfortable while being cared for will increase  3/30/2020 7834 by Virgie Calvin RN  Outcome: Completed  3/30/2020 0226 by Shikha Ulrich RN  Outcome: Ongoing     Problem: Psychomotor Activity - Altered:  Goal: Absence of psychomotor disturbance signs and symptoms  Description: Absence of psychomotor disturbance signs and symptoms  3/30/2020 3985 by Virgie Calvin RN  Outcome: Completed  3/30/2020 0226 by Shikha Ulrich RN  Outcome: Ongoing     Problem: Sensory Perception - Impaired:  Goal: Demonstrations of improved sensory functioning will increase  Description: Demonstrations of improved sensory functioning will increase  3/30/2020 7763 by Virgie Calvin RN  Outcome: Completed  3/30/2020 0226 by Shikha Ulrich RN  Outcome: Ongoing  Goal:

## 2020-06-22 RX ORDER — LOSARTAN POTASSIUM AND HYDROCHLOROTHIAZIDE 12.5; 1 MG/1; MG/1
TABLET ORAL
Qty: 90 TABLET | Refills: 1 | Status: SHIPPED | OUTPATIENT
Start: 2020-06-22

## 2020-09-20 ENCOUNTER — HOSPITAL ENCOUNTER (EMERGENCY)
Age: 75
Discharge: HOME | End: 2020-09-20
Attending: STUDENT IN AN ORGANIZED HEALTH CARE EDUCATION/TRAINING PROGRAM
Payer: MEDICARE

## 2020-09-20 VITALS
RESPIRATION RATE: 18 BRPM | HEART RATE: 65 BPM | SYSTOLIC BLOOD PRESSURE: 143 MMHG | TEMPERATURE: 98.4 F | BODY MASS INDEX: 28.35 KG/M2 | HEIGHT: 63 IN | DIASTOLIC BLOOD PRESSURE: 53 MMHG | WEIGHT: 160 LBS | OXYGEN SATURATION: 100 %

## 2020-09-20 LAB
A/G RATIO: 1 (ref 1.1–2.2)
ALBUMIN SERPL-MCNC: 2.8 G/DL (ref 3.4–5)
ALP BLD-CCNC: 141 U/L (ref 40–129)
ALT SERPL-CCNC: 11 U/L (ref 10–40)
ANION GAP SERPL CALCULATED.3IONS-SCNC: 10 MMOL/L (ref 3–16)
AST SERPL-CCNC: 21 U/L (ref 15–37)
BASOPHILS ABSOLUTE: 0 K/UL (ref 0–0.2)
BASOPHILS RELATIVE PERCENT: 0.4 %
BILIRUB SERPL-MCNC: 0.6 MG/DL (ref 0–1)
BUN BLDV-MCNC: 16 MG/DL (ref 7–20)
CALCIUM SERPL-MCNC: 8.6 MG/DL (ref 8.3–10.6)
CHLORIDE BLD-SCNC: 109 MMOL/L (ref 99–110)
CO2: 25 MMOL/L (ref 21–32)
CREAT SERPL-MCNC: 0.6 MG/DL (ref 0.6–1.2)
EOSINOPHILS ABSOLUTE: 0 K/UL (ref 0–0.6)
EOSINOPHILS RELATIVE PERCENT: 0 %
FIBRINOGEN: 368 MG/DL (ref 200–397)
GFR AFRICAN AMERICAN: >60
GFR NON-AFRICAN AMERICAN: >60
GLOBULIN: 2.7 G/DL
GLUCOSE BLD-MCNC: 88 MG/DL (ref 70–99)
HCT VFR BLD CALC: 22.4 % (ref 36–48)
HEMOGLOBIN: 7.5 G/DL (ref 12–16)
LYMPHOCYTES ABSOLUTE: 1.1 K/UL (ref 1–5.1)
LYMPHOCYTES RELATIVE PERCENT: 26.2 %
MAGNESIUM: 1.9 MG/DL (ref 1.8–2.4)
MCH RBC QN AUTO: 30.3 PG (ref 26–34)
MCHC RBC AUTO-ENTMCNC: 33.6 G/DL (ref 31–36)
MCV RBC AUTO: 90 FL (ref 80–100)
MONOCYTES ABSOLUTE: 0.4 K/UL (ref 0–1.3)
MONOCYTES RELATIVE PERCENT: 8.8 %
NEUTROPHILS ABSOLUTE: 2.8 K/UL (ref 1.7–7.7)
NEUTROPHILS RELATIVE PERCENT: 64.6 %
PDW BLD-RTO: 17.2 % (ref 12.4–15.4)
PLATELET # BLD: 232 K/UL (ref 135–450)
PMV BLD AUTO: 7.6 FL (ref 5–10.5)
POTASSIUM REFLEX MAGNESIUM: 3.3 MMOL/L (ref 3.5–5.1)
RBC # BLD: 2.49 M/UL (ref 4–5.2)
SODIUM BLD-SCNC: 144 MMOL/L (ref 136–145)
TOTAL PROTEIN: 5.5 G/DL (ref 6.4–8.2)
WBC # BLD: 4.3 K/UL (ref 4–11)

## 2020-09-20 PROCEDURE — 80053 COMPREHEN METABOLIC PANEL: CPT

## 2020-09-20 PROCEDURE — 99283 EMERGENCY DEPT VISIT LOW MDM: CPT

## 2020-09-20 PROCEDURE — 85384 FIBRINOGEN ACTIVITY: CPT

## 2020-09-20 PROCEDURE — 6370000000 HC RX 637 (ALT 250 FOR IP)

## 2020-09-20 PROCEDURE — 83735 ASSAY OF MAGNESIUM: CPT

## 2020-09-20 PROCEDURE — 85025 COMPLETE CBC W/AUTO DIFF WBC: CPT

## 2020-09-20 ASSESSMENT — PAIN SCALES - GENERAL: PAINLEVEL_OUTOF10: 6

## 2020-09-20 NOTE — ED PROVIDER NOTES
Primary Care Physician: Maureen Huynh MD   Attending Physician: No att. providers found     History   Chief Complaint   Patient presents with    Other     Pt brought in by [facility] from [address] [place] [address] Rd. Per squad patient has skin tear on left foot that, \"won't stop bleeding. \"        GABBY Smith is a 69 y.o. female resident of a skilled nursing facility with history of Alzheimer's, myocardial infarction, hypertension, presenting this evening brought by EMS with abrasions on both lower extremities. Per reports patient tripped into objects causing some test which were repaired with Steri-Strips. Leg was wrapped with gauze however bleeding did not subside. She was brought to the emergency room for treatment of a bleeding site. Apart from the bleeding she denies any fevers, chills, nausea, vomiting but complains of leg pain which is chronic and some swelling. Past Medical History:   Diagnosis Date    Acute myocardial infarction, unspecified site, episode of care unspecified     h/o    Alzheimer's disease ([facility] [address]     Anxiety state, unspecified     Coronary atherosclerosis of unspecified type of vessel, native or graft     Essential hypertension, benign     Gastritis 3/27/2015    Osteopenia 4/14/2015    Other and unspecified hyperlipidemia     Screening mammogram 2/26/2009    (Both)Benign    Severe Osteopenia 4/14/2015    Unspecified hearing loss         Past Surgical History:   Procedure Laterality Date    PTCA      UPPER GASTROINTESTINAL ENDOSCOPY  11/18/14    Dr. Sotelo Marker    UPPER GASTROINTESTINAL ENDOSCOPY N/A 3/26/2020    EGD BIOPSY performed by Jaren Tang DO at [facility] ENDOSCOPY        Family History   Problem Relation Age of Onset    Heart Attack Father     Diabetes Mother     Heart Attack Mother         Social History     Socioeconomic History    Marital status:       Spouse name: Not on file    Number of children: Not on file    Years of education: Not on file    Highest education level: Not on file   Occupational History    Not on file   Social Needs    Financial resource strain: Not on file    Food insecurity     Worry: Not on file     Inability: Not on file    Transportation needs     Medical: Not on file     Non-medical: Not on file   Tobacco Use    Smoking status: Former Smoker     Packs/day: 1.00     Years: 8.00     Pack years: 8.00     Types: Cigarettes     Last attempt to quit: 1997     Years since quittin.7    Smokeless tobacco: Never Used   Substance and Sexual Activity    Alcohol use: Yes    Drug use: No    Sexual activity: Not on file   Lifestyle    Physical activity     Days per week: Not on file     Minutes per session: Not on file    Stress: Not on file   Relationships    Social connections     Talks on phone: Not on file     Gets together: Not on file     Attends Spiritism service: Not on file     Active member of club or organization: Not on file     Attends meetings of clubs or organizations: Not on file     Relationship status: Not on file    Intimate partner violence     Fear of current or ex partner: Not on file     Emotionally abused: Not on file     Physically abused: Not on file     Forced sexual activity: Not on file   Other Topics Concern    Not on file   Social History Narrative    Not on file        Review of Systems   10 total systems reviewed and found to be negative unless otherwise noted in HPI     Physical Exam   BP (!) 143/53   Pulse 65   Temp 98.4 °F (36.9 °C) (Oral)   Resp 18   Ht 5' 3\" (1.6 m)   Wt 160 lb (72.6 kg)   SpO2 100%   BMI 28.34 kg/m²      CONSTITUTIONAL: Elderly looking woman who is demented  HEAD: atraumatic, normocephalic   EYES: PERRL, No injection, discharge or scleral icterus. ENT: Moist mucous membranes. NECK: Normal ROM, NO LAD   CARDIOVASCULAR: Regular rate and rhythm. No murmurs or gallop. PULMONARY/CHEST: Airway patent. No retractions.  Breath sounds clear with good air entry bilaterally. ABDOMEN: Soft, Non-distended and non-tender, without guarding or rebound. SKIN: Acyanotic, warm, dry   MUSCULOSKELETAL: Lower extremities bilateral swelling, pulses intact with abrasions around the shins with some bleeding worse on the left than right  NEUROLOGICAL: Awake and alert. Pulses intact. Grossly nonfocal   Nursing note and vitals reviewed. ED Course & Medical Decision Making   Medications - No data to display   Labs Reviewed   CBC WITH AUTO DIFFERENTIAL - Abnormal; Notable for the following components:       Result Value    RBC 2.49 (*)     Hemoglobin 7.5 (*)     Hematocrit 22.4 (*)     RDW 17.2 (*)     All other components within normal limits    Narrative:     Performed at:  [facility] [address], [place], [address]   Phone [phone]   COMPREHENSIVE METABOLIC PANEL W/ REFLEX TO MG FOR LOW K - Abnormal; Notable for the following components:    Potassium reflex Magnesium 3.3 (*)     Total Protein 5.5 (*)     Alb 2.8 (*)     Albumin/Globulin Ratio 1.0 (*)     Alkaline Phosphatase 141 (*)     All other components within normal limits    Narrative:     Performed at:  [facility] [address], [place], [facility] [address]   Phone [phone]   FIBRINOGEN    Narrative:     Performed at:  [facility] [address], [place], [facility] [address]   Phone [phone]   MAGNESIUM    Narrative:     Performed at:  [facility] [address], [place], [address]   Phone [phone]      No orders to display      PROCEDURES:   Procedures    ASSESSMENT AND PLAN:  Abhay Alvarado is a 69 y.o. female senting this evening brought by EMS with complaint of pressure on lower extremities with persistent bleeding. On exam she had some abrasions on the lower extremity with skin tear. In very fragile and wounds or bleeding sites with Steri-Strips.   Given

## 2021-06-25 ENCOUNTER — HOSPITAL ENCOUNTER (EMERGENCY)
Age: 76
Discharge: NURSING, REHAB OR LONG-TERM CARE | End: 2021-06-25
Attending: EMERGENCY MEDICINE
Payer: MEDICARE

## 2021-06-25 ENCOUNTER — APPOINTMENT (OUTPATIENT)
Dept: GENERAL RADIOLOGY | Age: 76
End: 2021-06-25
Payer: MEDICARE

## 2021-06-25 VITALS
DIASTOLIC BLOOD PRESSURE: 70 MMHG | TEMPERATURE: 99.2 F | HEART RATE: 64 BPM | RESPIRATION RATE: 16 BRPM | SYSTOLIC BLOOD PRESSURE: 126 MMHG | OXYGEN SATURATION: 95 %

## 2021-06-25 DIAGNOSIS — N39.0 URINARY TRACT INFECTION WITHOUT HEMATURIA, SITE UNSPECIFIED: ICD-10-CM

## 2021-06-25 DIAGNOSIS — R25.1 SHAKINESS: Primary | ICD-10-CM

## 2021-06-25 LAB
A/G RATIO: 1.2 (ref 1.1–2.2)
ALBUMIN SERPL-MCNC: 3.9 G/DL (ref 3.4–5)
ALP BLD-CCNC: 70 U/L (ref 40–129)
ALT SERPL-CCNC: 29 U/L (ref 10–40)
ANION GAP SERPL CALCULATED.3IONS-SCNC: 10 MMOL/L (ref 3–16)
AST SERPL-CCNC: 36 U/L (ref 15–37)
BACTERIA: ABNORMAL /HPF
BASOPHILS ABSOLUTE: 0 K/UL (ref 0–0.2)
BASOPHILS RELATIVE PERCENT: 0.2 %
BILIRUB SERPL-MCNC: 0.6 MG/DL (ref 0–1)
BILIRUBIN URINE: NEGATIVE
BLOOD, URINE: ABNORMAL
BUN BLDV-MCNC: 33 MG/DL (ref 7–20)
CALCIUM SERPL-MCNC: 9.4 MG/DL (ref 8.3–10.6)
CHLORIDE BLD-SCNC: 107 MMOL/L (ref 99–110)
CLARITY: CLEAR
CO2: 25 MMOL/L (ref 21–32)
COLOR: YELLOW
CREAT SERPL-MCNC: 0.8 MG/DL (ref 0.6–1.2)
EOSINOPHILS ABSOLUTE: 0 K/UL (ref 0–0.6)
EOSINOPHILS RELATIVE PERCENT: 0.2 %
EPITHELIAL CELLS, UA: 0 /HPF (ref 0–5)
GFR AFRICAN AMERICAN: >60
GFR NON-AFRICAN AMERICAN: >60
GLOBULIN: 3.2 G/DL
GLUCOSE BLD-MCNC: 94 MG/DL (ref 70–99)
GLUCOSE URINE: NEGATIVE MG/DL
HCT VFR BLD CALC: 32.1 % (ref 36–48)
HEMOGLOBIN: 10.8 G/DL (ref 12–16)
HYALINE CASTS: 0 /LPF (ref 0–8)
KETONES, URINE: NEGATIVE MG/DL
LEUKOCYTE ESTERASE, URINE: ABNORMAL
LYMPHOCYTES ABSOLUTE: 0.9 K/UL (ref 1–5.1)
LYMPHOCYTES RELATIVE PERCENT: 20.4 %
MCH RBC QN AUTO: 30.2 PG (ref 26–34)
MCHC RBC AUTO-ENTMCNC: 33.6 G/DL (ref 31–36)
MCV RBC AUTO: 89.7 FL (ref 80–100)
MICROSCOPIC EXAMINATION: YES
MONOCYTES ABSOLUTE: 0.6 K/UL (ref 0–1.3)
MONOCYTES RELATIVE PERCENT: 14.3 %
NEUTROPHILS ABSOLUTE: 2.7 K/UL (ref 1.7–7.7)
NEUTROPHILS RELATIVE PERCENT: 64.9 %
NITRITE, URINE: NEGATIVE
PDW BLD-RTO: 16.1 % (ref 12.4–15.4)
PH UA: 6 (ref 5–8)
PLATELET # BLD: 146 K/UL (ref 135–450)
PMV BLD AUTO: 7.6 FL (ref 5–10.5)
POTASSIUM SERPL-SCNC: 4.5 MMOL/L (ref 3.5–5.1)
PROTEIN UA: 30 MG/DL
RBC # BLD: 3.57 M/UL (ref 4–5.2)
RBC UA: 2 /HPF (ref 0–4)
SODIUM BLD-SCNC: 142 MMOL/L (ref 136–145)
SPECIFIC GRAVITY UA: 1.02 (ref 1–1.03)
TOTAL PROTEIN: 7.1 G/DL (ref 6.4–8.2)
TROPONIN: <0.01 NG/ML
URINE REFLEX TO CULTURE: YES
URINE TYPE: ABNORMAL
UROBILINOGEN, URINE: 0.2 E.U./DL
WBC # BLD: 4.2 K/UL (ref 4–11)
WBC UA: 33 /HPF (ref 0–5)

## 2021-06-25 PROCEDURE — 93005 ELECTROCARDIOGRAM TRACING: CPT | Performed by: EMERGENCY MEDICINE

## 2021-06-25 PROCEDURE — 71045 X-RAY EXAM CHEST 1 VIEW: CPT

## 2021-06-25 PROCEDURE — 87077 CULTURE AEROBIC IDENTIFY: CPT

## 2021-06-25 PROCEDURE — 84484 ASSAY OF TROPONIN QUANT: CPT

## 2021-06-25 PROCEDURE — 99284 EMERGENCY DEPT VISIT MOD MDM: CPT

## 2021-06-25 PROCEDURE — 36415 COLL VENOUS BLD VENIPUNCTURE: CPT

## 2021-06-25 PROCEDURE — 87186 SC STD MICRODIL/AGAR DIL: CPT

## 2021-06-25 PROCEDURE — 85025 COMPLETE CBC W/AUTO DIFF WBC: CPT

## 2021-06-25 PROCEDURE — 6370000000 HC RX 637 (ALT 250 FOR IP): Performed by: PHYSICIAN ASSISTANT

## 2021-06-25 PROCEDURE — 87086 URINE CULTURE/COLONY COUNT: CPT

## 2021-06-25 PROCEDURE — 81001 URINALYSIS AUTO W/SCOPE: CPT

## 2021-06-25 PROCEDURE — 80053 COMPREHEN METABOLIC PANEL: CPT

## 2021-06-25 RX ORDER — NITROFURANTOIN 25; 75 MG/1; MG/1
100 CAPSULE ORAL 2 TIMES DAILY
Qty: 10 CAPSULE | Refills: 0 | Status: SHIPPED | OUTPATIENT
Start: 2021-06-25 | End: 2021-06-30

## 2021-06-25 RX ORDER — LORAZEPAM 0.5 MG/1
0.5 TABLET ORAL ONCE
Status: COMPLETED | OUTPATIENT
Start: 2021-06-25 | End: 2021-06-25

## 2021-06-25 RX ADMIN — LORAZEPAM 0.5 MG: 0.5 TABLET ORAL at 21:09

## 2021-06-25 ASSESSMENT — ENCOUNTER SYMPTOMS
SHORTNESS OF BREATH: 0
COUGH: 0
RHINORRHEA: 0
NAUSEA: 0
DIARRHEA: 0
WHEEZING: 0
ABDOMINAL PAIN: 0
VOMITING: 0

## 2021-06-25 NOTE — ED NOTES
Bed: 08  Expected date:   Expected time:   Means of arrival:   Comments:  everett Fried [facility]  06/25/21 1933

## 2021-06-26 LAB
EKG ATRIAL RATE: 67 BPM
EKG DIAGNOSIS: NORMAL
EKG P AXIS: 14 DEGREES
EKG P-R INTERVAL: 162 MS
EKG Q-T INTERVAL: 416 MS
EKG QRS DURATION: 80 MS
EKG QTC CALCULATION (BAZETT): 439 MS
EKG R AXIS: 16 DEGREES
EKG T AXIS: -49 DEGREES
EKG VENTRICULAR RATE: 67 BPM

## 2021-06-26 PROCEDURE — 93010 ELECTROCARDIOGRAM REPORT: CPT | Performed by: INTERNAL MEDICINE

## 2021-06-26 NOTE — ED NOTES
Report called to [facility], RN verbalized understanding and denied any need for further information, report also given to transport team at time of [id number] Toy Wilhelm RN  06/25/21 2733

## 2021-06-26 NOTE — ED PROVIDER NOTES
MG TABLET    Take 10 mg by mouth daily. FERROUS SULFATE (IRON 325) 325 (65 FE) MG TABLET    Take 1 tablet by mouth 2 times daily    LOSARTAN-HYDROCHLOROTHIAZIDE (HYZAAR) 100-12.5 MG PER TABLET    TAKE 1 TABLET BY MOUTH EVERY DAY    NITROGLYCERIN (NITROSTAT) 0.4 MG SL TABLET    Place 0.4 mg under the tongue every 5 minutes as needed. OMEPRAZOLE (PRILOSEC) 40 MG DELAYED RELEASE CAPSULE    Take 1 capsule by mouth daily         ALLERGIES     Alcohol, Cephalosporins, Vitamins a & d, and Penicillins    FAMILYHISTORY       Family History   Problem Relation Age of Onset    Heart Attack Father     Diabetes Mother     Heart Attack Mother           SOCIAL HISTORY       Social History     Tobacco Use    Smoking status: Former Smoker     Packs/day: 1.00     Years: 8.00     Pack years: 8.00     Types: Cigarettes     Quit date: 1997     Years since quittin.4    Smokeless tobacco: Never Used   Substance Use Topics    Alcohol use: Yes    Drug use: No       SCREENINGS             PHYSICAL EXAM    (up to 7 for level 4, 8 or more for level 5)     ED Triage Vitals [21]   BP Temp Temp Source Pulse Resp SpO2 Height Weight   (!) 182/89 99.2 °F (37.3 °C) Oral 70 16 99 % -- --       Physical Exam  Vitals and nursing note reviewed. Constitutional:       General: She is not in acute distress. Appearance: She is well-developed. She is not ill-appearing, toxic-appearing or diaphoretic. HENT:      Head: Normocephalic and atraumatic. Right Ear: External ear normal.      Left Ear: External ear normal.      Nose: Nose normal.   Eyes:      General:         Right eye: No discharge. Left eye: No discharge. Extraocular Movements: Extraocular movements intact. Conjunctiva/sclera: Conjunctivae normal.      Pupils: Pupils are equal, round, and reactive to light. Cardiovascular:      Rate and Rhythm: Normal rate and regular rhythm. Heart sounds: Normal heart sounds.    Pulmonary: Effort: Pulmonary effort is normal. No respiratory distress. Breath sounds: Normal breath sounds. Chest:      Chest wall: No tenderness. Abdominal:      General: There is no distension. Palpations: Abdomen is soft. Tenderness: There is no abdominal tenderness. Musculoskeletal:         General: Normal range of motion. Cervical back: Normal range of motion and neck supple. Skin:     General: Skin is warm and dry. Neurological:      Mental Status: She is alert. Mental status is at baseline. She is disoriented and confused. GCS: GCS eye subscore is 4. GCS verbal subscore is 5. GCS motor subscore is 6. Cranial Nerves: Cranial nerves are intact. Sensory: Sensation is intact. Motor: Motor function is intact. No tremor.    Psychiatric:         Behavior: Behavior normal.         DIAGNOSTIC RESULTS   LABS:    Labs Reviewed   CBC WITH AUTO DIFFERENTIAL - Abnormal; Notable for the following components:       Result Value    RBC 3.57 (*)     Hemoglobin 10.8 (*)     Hematocrit 32.1 (*)     RDW 16.1 (*)     Lymphocytes Absolute 0.9 (*)     All other components within normal limits    Narrative:     Performed at:  [facility] [address], [place], [address]   Phone [phone]   COMPREHENSIVE METABOLIC PANEL - Abnormal; Notable for the following components:    BUN 33 (*)     All other components within normal limits    Narrative:     Performed at:  [facility] [address], [place], [address]   Phone [phone]   URINE RT REFLEX TO CULTURE - Abnormal; Notable for the following components:    Blood, Urine TRACE (*)     Protein, UA 30 (*)     Leukocyte Esterase, Urine MODERATE (*)     All other components within normal limits    Narrative:     Performed at:  [facility] [address], [place], [address]   Phone [phone]   MICROSCOPIC URINALYSIS - Abnormal; relief and will be reevaluated. Please see attending note for EKG interpretation. CBC and CMP are unremarkable. Troponin is negative. Chest x-ray shows no acute process. Urinalysis positive for moderate leukocytes and 33 red blood cells. She returned empirically with Macrobid due to several antibiotic allergies. I believe she may safely discharged back to ECF at this time. Low suspicion for sepsis. No seizure-like activity. Patient has a normal repeat neurological exam. At this time there is no indication of head injury, encephalopathy, multiple sclerosis, TIA/CVA, seizures, dehydration, hypoglycemia, mass lesions, metabolic cause, cardiac arrhythmia, PE, GI bleeding or orthostatic hypotension. Patient is stable throughout ED course and safe for outpatient follow-up. Patient made aware of treatment plan and verbally understood and agreed. Patient stable for outpatient follow-up with their family doctor in 24-48 hours. She is agreeable to this plan stable for discharge at this time. FINAL IMPRESSION      1. Shakiness    2.  Urinary tract infection without hematuria, site unspecified          DISPOSITION/PLAN   DISPOSITION Decision To Discharge 06/25/2021 10:09:32 PM      PATIENT REFERRED TO:  Kitty Kimble MD    Call   For a re-check in  2-3  days    [facility] Emergency Department  [facility] [id number]  [phone]  Go to   If symptoms worsen      DISCHARGE MEDICATIONS:  New Prescriptions    NITROFURANTOIN, MACROCRYSTAL-MONOHYDRATE, (MACROBID) 100 MG CAPSULE    Take 1 capsule by mouth 2 times daily for 5 days       DISCONTINUED MEDICATIONS:  Discontinued Medications    No medications on file              (Please note that portions of this note were completed with a voice recognition program.  Efforts were made to edit the dictations but occasionally words are mis-transcribed.)    Jhoan Rowland PA-C (electronically signed)           Vinicio York MINOO  06/25/21 9776

## 2021-06-28 LAB
ORGANISM: ABNORMAL
URINE CULTURE, ROUTINE: ABNORMAL

## 2021-06-29 NOTE — PLAN OF CARE
Patients continuum of care needs are met  Description: Patients continuum of care needs are met  Outcome: Ongoing     Problem: Anxiety:  Goal: Level of anxiety will decrease  Description: Level of anxiety will decrease  Outcome: Ongoing     Problem: Confusion - Acute:  Goal: Absence of continued neurological deterioration signs and symptoms  Description: Absence of continued neurological deterioration signs and symptoms  Outcome: Ongoing  Goal: Mental status will be restored to baseline  Description: Mental status will be restored to baseline  Outcome: Ongoing     Problem: Discharge Planning:  Goal: Ability to perform activities of daily living will improve  Description: Ability to perform activities of daily living will improve  Outcome: Ongoing  Goal: Participates in care planning  Description: Participates in care planning  Outcome: Ongoing     Problem: Injury - Risk of, Physical Injury:  Goal: Will remain free from falls  Description: Will remain free from falls  Outcome: Ongoing  Goal: Absence of physical injury  Description: Absence of physical injury  Outcome: Ongoing     Problem: Mood - Altered:  Goal: Mood stable  Description: Mood stable  Outcome: Ongoing  Goal: Absence of abusive behavior  Description: Absence of abusive behavior  Outcome: Ongoing  Goal: Verbalizations of feeling emotionally comfortable while being cared for will increase  Description: Verbalizations of feeling emotionally comfortable while being cared for will increase  Outcome: Ongoing     Problem: Psychomotor Activity - Altered:  Goal: Absence of psychomotor disturbance signs and symptoms  Description: Absence of psychomotor disturbance signs and symptoms  Outcome: Ongoing     Problem: Sensory Perception - Impaired:  Goal: Demonstrations of improved sensory functioning will increase  Description: Demonstrations of improved sensory functioning will increase  Outcome: Ongoing  Goal: Decrease in sensory misperception frequency  Description: Decrease in sensory misperception frequency  Outcome: Ongoing  Goal: Able to refrain from responding to false sensory perceptions  Description: Able to refrain from responding to false sensory perceptions  Outcome: Ongoing  Goal: Demonstrates accurate environmental perceptions  Description: Demonstrates accurate environmental perceptions  Outcome: Ongoing  Goal: Able to distinguish between reality-based and nonreality-based thinking  Description: Able to distinguish between reality-based and nonreality-based thinking  Outcome: Ongoing  Goal: Able to interrupt nonreality-based thinking  Description: Able to interrupt nonreality-based thinking  Outcome: Ongoing     Problem: Sleep Pattern Disturbance:  Goal: Appears well-rested  Description: Appears well-rested  Outcome: Ongoing     Problem: Bleeding:  Goal: Will show no signs and symptoms of excessive bleeding  Description: Will show no signs and symptoms of excessive bleeding  Outcome: Ongoing (1) Other Medications/None

## 2021-12-27 NOTE — PROGRESS NOTES
Report called to Cuate Lugo RN, at [facility]. Report given to EMS transport. Patient left at this time. chest pain/complains of pain/discomfort

## 2022-03-11 NOTE — TELEPHONE ENCOUNTER
"ED Provider Note    CHIEF COMPLAINT  Chief Complaint   Patient presents with   • Shortness of Breath     Reports recent COVID+ test, feels SOB on exertion and \"dizzy when I'm up and around\". Denies CP.        HPI  Moris Moffett is a 43 y.o. male who presents with a chief complaint of shortness of breath and hypoxia.  Patient initially developed symptoms on 2/21 of sore throat, generalized fatigue, and headache.  His symptoms persisted and on 2/27 he took a home test and was positive for COVID-19.  He is not vaccinated.  The following week he developed a fever for 5 days as high as 102 °F.  They do have a pulse oximeter at home but he did not use it until several days ago at which time he was sitting in bed and his O2 sats were in the mid 80s.  He felt okay and they rechecked his oxygen in the morning at which time it had improved to 92%.  Since that time now he has had significantly worsening dyspnea on exertion.  His O2 sats dropped to the low to mid 70s when ambulating.  He denies any chest pain, leg swelling, or hemoptysis.    REVIEW OF SYSTEMS  See HPI for further details.  Shortness of breath.  Hypoxia.  All other systems are negative.     PAST MEDICAL HISTORY   has a past medical history of Obesity (BMI 35.0-39.9 without comorbidity).    SOCIAL HISTORY  Social History     Tobacco Use   • Smoking status: Never Smoker   • Smokeless tobacco: Never Used   Vaping Use   • Vaping Use: Never used   Substance and Sexual Activity   • Alcohol use: Yes     Alcohol/week: 0.0 oz     Comment: Rarely   • Drug use: No   • Sexual activity: Yes     Partners: Female       SURGICAL HISTORY  patient denies any surgical history    CURRENT MEDICATIONS  Home Medications    **Home medications have not yet been reviewed for this encounter**         ALLERGIES  Allergies   Allergen Reactions   • Amoxicillin Hives       PHYSICAL EXAM  VITAL SIGNS: BP (!) 162/92   Pulse 88   Temp 36.6 °C (97.8 °F) (Temporal)   Resp 20   Ht 1.829 " Patient states she woke up feeling a little dizzy. She put her eye drops in for dry eyes and when she did she became more dizzy. An hour later she put the moisturizing ointment in her eyes and got the same reaction. Patient called her opthalmologist and was told to contact her PCP. Please call patient before she is scheduled to take the next dose. m (6')   Wt (!) 138 kg (304 lb 7.3 oz)   SpO2 94%   BMI 41.29 kg/m²    Pulse ox interpretation: I interpret this pulse ox as normal.  Constitutional: Alert in no apparent distress.  HENT: No signs of trauma, Bilateral external ears normal, Nose normal.  Tacky mucous membranes.  Eyes: Pupils are equal and reactive, Conjunctiva normal, Non-icteric.   Neck: Normal range of motion, No tenderness, Supple, No stridor.   Lymphatic: No lymphadenopathy noted.   Cardiovascular: Regular rate and rhythm, no murmurs. Pulses symmetrical.  Thorax & Lungs: Nasal cannula in place.  Normal breath sounds, No respiratory distress, No wheezing.  Abdomen: Bowel sounds normal, Soft, No tenderness, No masses, No pulsatile masses. No peritoneal signs.  Skin: Warm, Dry, No erythema, No rash.   Back: Normal alignment.  Extremities: No edema.  Musculoskeletal: No major deformities noted.   Neurologic: Alert, No focal deficits noted.   Psychiatric: Affect normal, Judgment normal, Mood normal.     DIAGNOSTIC STUDIES / PROCEDURES    LABS  Results for orders placed or performed during the hospital encounter of 03/10/22   TROPONIN   Result Value Ref Range    Troponin T 8 6 - 19 ng/L   CBC WITH DIFFERENTIAL   Result Value Ref Range    WBC 8.3 4.8 - 10.8 K/uL    RBC 4.87 4.70 - 6.10 M/uL    Hemoglobin 14.7 14.0 - 18.0 g/dL    Hematocrit 42.5 42.0 - 52.0 %    MCV 87.3 81.4 - 97.8 fL    MCH 30.2 27.0 - 33.0 pg    MCHC 34.6 33.7 - 35.3 g/dL    RDW 40.2 35.9 - 50.0 fL    Platelet Count 490 (H) 164 - 446 K/uL    MPV 9.7 9.0 - 12.9 fL    Neutrophils-Polys 59.90 44.00 - 72.00 %    Lymphocytes 29.80 22.00 - 41.00 %    Monocytes 7.70 0.00 - 13.40 %    Eosinophils 1.10 0.00 - 6.90 %    Basophils 0.50 0.00 - 1.80 %    Immature Granulocytes 1.00 (H) 0.00 - 0.90 %    Nucleated RBC 0.00 /100 WBC    Neutrophils (Absolute) 4.98 1.82 - 7.42 K/uL    Lymphs (Absolute) 2.48 1.00 - 4.80 K/uL    Monos (Absolute) 0.64 0.00 - 0.85 K/uL    Eos (Absolute) 0.09 0.00 - 0.51  K/uL    Baso (Absolute) 0.04 0.00 - 0.12 K/uL    Immature Granulocytes (abs) 0.08 0.00 - 0.11 K/uL    NRBC (Absolute) 0.00 K/uL   Comp Metabolic Panel   Result Value Ref Range    Sodium 136 135 - 145 mmol/L    Potassium 3.6 3.6 - 5.5 mmol/L    Chloride 102 96 - 112 mmol/L    Co2 23 20 - 33 mmol/L    Anion Gap 11.0 7.0 - 16.0    Glucose 119 (H) 65 - 99 mg/dL    Bun 10 8 - 22 mg/dL    Creatinine 0.80 0.50 - 1.40 mg/dL    Calcium 8.7 8.4 - 10.2 mg/dL    AST(SGOT) 50 (H) 12 - 45 U/L    ALT(SGPT) 73 (H) 2 - 50 U/L    Alkaline Phosphatase 71 30 - 99 U/L    Total Bilirubin 0.5 0.1 - 1.5 mg/dL    Albumin 3.5 3.2 - 4.9 g/dL    Total Protein 6.7 6.0 - 8.2 g/dL    Globulin 3.2 1.9 - 3.5 g/dL    A-G Ratio 1.1 g/dL   CoV-2, FLU A/B, and RSV by PCR (2-4 Hours CEPHEID) : Collect NP swab in VTM    Specimen: Respirate   Result Value Ref Range    Influenza virus A RNA Negative Negative    Influenza virus B, PCR Negative Negative    RSV, PCR Negative Negative    SARS-CoV-2 by PCR NotDetected     SARS-CoV-2 Source NP Swab    D-DIMER   Result Value Ref Range    D-Dimer Screen 2.68 (H) 0.00 - 0.50 ug/mL (FEU)   PROCALCITONIN   Result Value Ref Range    Procalcitonin 0.14 <0.25 ng/mL   ESTIMATED GFR   Result Value Ref Range    GFR If African American >60 >60 mL/min/1.73 m 2    GFR If Non African American >60 >60 mL/min/1.73 m 2   PLATELET ESTIMATE   Result Value Ref Range    Plt Estimation Normal    MORPHOLOGY   Result Value Ref Range    RBC Morphology Present     Large Platelets 1+     Reactive Lymphocytes Many    DIFFERENTIAL COMMENT   Result Value Ref Range    Comments-Diff see below    proBrain Natriuretic Peptide, NT   Result Value Ref Range    NT-proBNP 71 0 - 125 pg/mL   EKG   Result Value Ref Range    Report       [facility] Emergency Dept.    Test Date:  2022-03-10  Pt Name:    OLVIN MOSELEY                Department: [facility]  MRN:        [id number]                      Room:       -ROOM 4  Gender:     Male                          Technician: AR  :        [id number]                   Requested By:EMILY DESAI  Order #:    [id number]                    Reading MD: Emily Desai MD    Measurements  Intervals                                Axis  Rate:       75                           P:          20  CO:         149                          QRS:        45  QRSD:       107                          T:          3  QT:         392  QTc:        438    Interpretive Statements  Sinus rhythm  No previous ECG available for comparison  Electronically Signed On 3- 20:09:14 PST by Emily Desai MD       RADIOLOGY  CT-CTA CHEST PULMONARY ARTERY W/ RECONS   Final Result      1.  No central or segmental pulmonary embolus   2.  BILATERAL airspace disease likely pneumonia. Covid pneumonia is a consideration.   3.  Enlarged hilar and mediastinal lymph nodes            DX-CHEST-PORTABLE (1 VIEW)   Final Result      Bilateral peripheral opacities, especially in the mid/lower lungs, sequela of active or recent pneumonia. COVID pneumonia is high in the differential.           COURSE & MEDICAL DECISION MAKING  Pertinent Labs & Imaging studies reviewed. (See chart for details)  This is a 43 year old male, unvaccinated against COVID-19, who is here with hypoxia in the setting of recent positive COVID-19 home test. He is hypoxic here but not tachycardic. Concerning for bacterial pneumonia or PE. Given a dose of decadron. He is currently requiring supplemental O2. He does not use O2 at baseline. EKG does not suggest ischemia. CXR c/w COVID pneumonia. CTA chest performed after d-dimer returned quite elevated which ruled out PE but is consistent with COVID pneumonia. Procalcitonin negative. Troponin negative. COVID-19 test here is negative. Will defer antibiotics. No fever, productive cough, leukocytosis to suggest bacterial component.    Unable to obtain oxygen for patient tonight. He will benefit from hospitalization for additional workup  and management.    Discussed with the hospitalist and admitted in guarded condition.    FINAL IMPRESSION  1. COVID-19 long hauler  2. Hypoxia    Electronically signed by: Bobo Martinez M.D., 3/10/2022 6:36 PM

## 2022-05-30 NOTE — PROGRESS NOTES
"[facility]  Trauma Service Progress Note    Date of Service (when I saw the patient): 05/30/2022     Assessment & Plan      Trauma Mechanism: GLF  Date of Injury: 5/26/22  Known Injuries:  1. Acute C7 osteophyte fracture     Other diagnosis:  1. COVID+, asymptomatic  2. Ecoli UTI     Neuro/Pain/Psych:  # Ground level fall  - Head CT on admission: No acute intracranial pathology.   # Acute on chronic pain   Prn: Tylenol,   # Late onset Alzheimer's dementia with behavioral disturbance   # Dementia with psychosis   - Per Note on 3/17/22: She is seen today after nurses report increased irritability, agitation, aggression and refusal of cares. Bathing and changing clothes has always been an issue, but she has become increasingly aggressive with any attempt to assist with hygiene. She has also become verbally abusive towards other residents and is creating a disturbance on the unit.She is in her room today, irritable and not cooperative. Yells \"get out.\"  - Continue PTA: citalopram, Seroquel 50 TID, Depakote,   - PRN Seroquel for aggression  - Maintain circadian rhythm. Lights on during the day. Off at night, minimize cares at night. OOB during the day.      Pulmonary:  - Supplemental oxygen to keep saturation above 92 %.   - Incentive spirometer while awake      Cardiovascular:    # Hyperlipidemia   # HTN  - Monitor hemodynamic status. No antihypertensives PTA  - continue PTA: Atorvastatin,      GI/Nutrition:    # GERD  - Regular diet      Renal/ Fluids/Electrolytes:  # Hypokalemia, improved with oral replacements  - Creatinine: 0.52  - electrolyte replacement protocol in place, however pt is boarding in the ED and per policy they do not do RN managed replacements.      Endocrine:  - No management indication.      Infectious disease:   # COVID 19 +, 5/21/22  # Leukocytosis, improving  #  EColi UTI    - WBC: 14.4?17.7?18.2 (baseline high 600s)  - CRP: 180 on admission, " afebrile  - UA: Nitrite +, Leukocyte Esterase: small, WBC: 13, Bacteria many. UC: 10,000-50,000 Ecoli. Sensitivities pending    - Patient is easily agitated, started IV Ceftriaxone, pt received 1 dose on 5/28. Has pulled out IVs, thus transitioned to   Bactrim DS bid x 3 days on 5/29      Hematology:    # Thrombocytosis, acute on chronic elevation, probably reactive 22 infection, trauma  - Hgb 11.5. Stable   - Threshold for transfusion if hgb <7.0 or signs/symptoms of hypoperfusion.     - Platelet Count: 1,182? 1,126?1,072, 7/6/21:720, baseline high 650-720, continue to monitor and trend.   - continue PTA ASA  - Started Lovenox 30bid, for DVT prophylaxis      Musculoskeletal:  # Multiple Falls   # Osteoporosis   # s/p L hip IM nail on 6/26/21 with Dr. Mckenzie  # Weakness and deconditioning of chronic illness   # Acute C7 osteophyte fracture  - Neurosurgery following: No cervical collar necessary currently given likelihood of increasing fall risk for patient. Follow up with Neurosurgery clinic in 2 weeks with repeat flexion/extension XR (ordered and arranged).   - Changed activity orders and discontinued C-spine precautions.   - Physical and occupational therapy consults.     Skin:   - dilgent cares to prevent skin breakdown and wound formation.       Lines/ tubes/ drains:  - PIV      General Cares:                 PPI/H2 blocker:  NA                DVT prophylaxis: Lovenox                 Bowel Regimen/Date of last stool: in place                Pulmonary toilet: IS     Code status:  DNR/DNI                  Discharge goals:     Adequate pain management: yes    VSS x24 hours: yes    Hemoglobin stable x 48 hours: NA    Ambulating safely and/or therapy evals complete: yes, ambulates with SBA    Drains/lines removed or plan in place to manage: yes    Teaching done:     Other: Under COVID isolation  Expected D/C date: Tuesday, per [facility] facility, cannot accept until 5/31, d/t staffing issues.     Interval History    No acute issues today. States I just want to sleep. Pain to the right forehead  ROS x 8 negative with exception of those things listed in interval hx    Physical Exam   Temp: 97.4  F (36.3  C) Temp src: Oral BP: 134/70 Pulse: 70   Resp: 16 SpO2: 97 % O2 Device: None (Room air)    Vitals:    05/26/22 2027 05/29/22 2244 05/30/22 0620   Weight: 59.4 kg (131 lb) 56 kg (123 lb 7.3 oz) 56.3 kg (124 lb 1.9 oz)     Vital Signs with Ranges  Temp:  [97.4  F (36.3  C)-98.6  F (37  C)] 97.4  F (36.3  C)  Pulse:  [69-76] 70  Resp:  [16-18] 16  BP: (134-157)/(64-79) 134/70  SpO2:  [95 %-97 %] 97 %  I/O last 3 completed shifts:  In: 120 [P.O.:120]  Out: -   Bradner Coma Scale - Total 14/15  Eye Response (E): 4   4= spontaneous, 3= to verbal/voice, 2= to pain, 1= No response   Verbal Response (V): 4   5= Orientated, converses, 4= Confused, converses, 3= Inappropriate words, 2= Incomprehensible sounds, 1=No response   Motor Response (M): 6   6= Obeys commands, 5= Localizes to pain, 4= Withdrawal to pain, 3=Fexion to pain, 2= Extension to pain, 1= No response   Constitutional: Awake, alert, cooperative, no apparent distress.  ENT: Normocephalic, atraumatic  Respiratory: No increased work of breathing, good air exchange, clear to auscultation bilaterally, no crackles or wheezing.  Cardiovascular:  regular rate and rhythm, normal S1 and S2  GI: Normal bowel sounds, abdomen soft, non-distended, non-tender, no guarding  Genitourinary:  Not seen  Skin: Warm and dry  Musculoskeletal: There is no redness, warmth, or swelling of the joints.  Pedal pulse palpated.  Neurologic: Awake, alert, oriented. Strength and sensory is intact. No focal deficits.  Neuropsychiatric: Calm, normal eye contact, alert, affect appropriate to situation, oriented, thought process normal.    Afanwi L. Kube, APRN CNP  To contact the trauma service use job code pager [phone],   Numeric texts or alpha text through [facility]     ELBOW LEFT (MIN 3 VIEWS)   Final Result   No acute finding of the bilateral elbows, bilateral knees or left   tibia/fibula. XR CHEST PORTABLE   Final Result   No significant findings or interval change. Assessment/Plan:    Active Hospital Problems    Diagnosis    DENAE (acute kidney injury) ([facility] [address] [N17.9]     Rhabdomyolysis 2/2 fall  - CK >1000 on admission  - started on IVF  - continue to trend CK levels  - no evidence of trauma on imaging  Clinically improving gradually, continue IV hydration. Nontraumatic rhabdomyolysis     DENAE  - 2/2 rhabdo vs prerenal  - started on IVF  - continue to monitor closely  Gradually improving with IV hydration, nephrology consulted. Clinically resolved.     UTI  - no evidence of sepsis  - noted on UA. Urine culture pending  - started on levaquin due to allergies  - continue IVF  Clinically improving.     Acute metabolic encephalopathy  - likely due to DENAE, UTI  - continue supportive treatment    Anemia, unknown etiology, patient was found Hemoccult positive, no need for blood transfusion for now, started on PPI, GI consult. GI input noted, plan for EGD and colonoscopy in a.m. EGD and colonoscopy has been postponed due to hypotension. Acute blood loss anemia, hemoglobin dropped to 5.8 overnight, patient was given 2 unit of packed RBC transfusion with subsequent hemoglobin at 7.8, continue IV hydration, maintain blood pressure to keep map more than 65, continuing PPI, patient need GI evaluation in form of EGD and colonoscopy. Further management as per GI service. Repeat hemoglobin of 6.9 on 3/24/2020, plan to give 1 more unit of packed RBC transfusion today. Hemoglobin stable at 7.6 after transfusion. Continue to monitor. Patient remained hemodynamically stable. GI following. Status post EGD done on 3/26/2020- esophagus and stomach and duodenum were normal.  Biopsy obtained. Recommended to resume Plavix and aspirin, will monitor H&H.   If significant bleeding plan for colonoscopy as per GI.     Elevated troponin with known CAD  - mild. No chest pain reported  - likely 2/2 DENAE and rhabdo. Low concern for ACS  - continue to trend trops for now  - continue home ASA, plavix, statin. Holding BB due to hypotension. In view of active GI bleed will be holding aspirin and Plavix. Plavix was resumed.     Bradycardia  - unclear etiology. Appears to be asymptomatic at present  - holding home BB  Continue to monitor on telemetry. Resolved.     Hyponatremia  - likely hypovolemic  - started on IVF  - continue to monitor.  Goal level around 129 in 24 hours     Hypokalemia  - monitor and replete     HTN  - hypotensive on admission  - holding home BP meds     HLD  - continue home statin    DVT Prophylaxis: Subcu heparin  Diet: DIET GENERAL;  Code Status: Full Code    PT/OT Eval Status:  ordered    Dispo - 1 to 2 days    Delfina Delgado MD

## 2023-07-06 NOTE — FLOWSHEET NOTE
03/25/20 1143   Encounter Summary   Services provided to: Patient not available   Referral/Consult From: Other ;Rounding   Continue Visiting   (3/25/Attempted call.  No answer.) BPup, restart [place]

## 2023-07-27 NOTE — ED PROVIDER NOTES
[facility] Emergency Department      Pt Name: Deedee Archer  MRN: [id number]  [place] 1945  Date of evaluation: 6/25/2021  Provider: Jemal Cervantes MD  I independently performed a history and physical on Deedee Archer. All diagnostic, treatment, and disposition decisions were made by myself in conjunction with the advanced practice provider. HPI: Deedee Archer presented with   Chief Complaint   Patient presents with    Anxiety     pt brought in by ems from [employer]. hx dementia. pt states she gets episodes of anxiety where she gets scared. denies chest pain. pt c/o having a cold and urianating frequently. repeats same sentences often. Deedee Archer has a past medical history of Acute myocardial infarction, unspecified site, episode of care unspecified, Alzheimer's disease ([facility] [address] Anxiety state, unspecified, Coronary atherosclerosis of unspecified type of vessel, native or graft, Essential hypertension, benign, Gastritis (3/27/2015), Osteopenia (4/14/2015), Other and unspecified hyperlipidemia, Screening mammogram (2/26/2009), Severe Osteopenia (4/14/2015), and Unspecified hearing loss. She has a past surgical history that includes Percutaneous Transluminal Coronary Angio; Upper gastrointestinal endoscopy (11/18/14); and Upper gastrointestinal endoscopy (N/A, 3/26/2020). No current facility-administered medications on file prior to encounter. Current Outpatient Medications on File Prior to Encounter   Medication Sig Dispense Refill    losartan-hydrochlorothiazide (HYZAAR) 100-12.5 MG per tablet TAKE 1 TABLET BY MOUTH EVERY DAY 90 tablet 1    ferrous sulfate (IRON 325) 325 (65 Fe) MG tablet Take 1 tablet by mouth 2 times daily 180 tablet 1    omeprazole (PRILOSEC) 40 MG delayed release capsule Take 1 capsule by mouth daily 30 capsule 3    aspirin 325 MG tablet Take 325 mg by mouth daily      atorvastatin (LIPITOR) 80 MG tablet Take 1 tablet by mouth daily.  27 All other components within normal limits    Narrative:     Performed at:  [facility] [address], [place], [address]   Phone [phone]   URINE RT REFLEX TO CULTURE - Abnormal; Notable for the following components:    Blood, Urine TRACE (*)     Protein, UA 30 (*)     Leukocyte Esterase, Urine MODERATE (*)     All other components within normal limits    Narrative:     Performed at:  [facility] [address], [place], [address]   Phone [phone]   MICROSCOPIC URINALYSIS - Abnormal; Notable for the following components:    Bacteria, UA RARE (*)     WBC, UA 33 (*)     All other components within normal limits    Narrative:     Performed at:  [facility] [address], [place], [address]   Phone [phone]   CULTURE, URINE   TROPONIN    Narrative:     Performed at:  [facility] [address], [place], [address] [facility]   Phone [phone]     RADIOLOGY:     Plain x-rays were viewed by me:   XR CHEST PORTABLE   Final Result   No radiographic evidence of acute pulmonary disease. EKG:  Read by me in the absence of a cardiologist shows:  SR, rate 67, LVH, poor r wave progression, NSSTTWA, axis 16 degrees    Discharge Medication List as of 6/25/2021 11:08 PM      START taking these medications    Details   nitrofurantoin, macrocrystal-monohydrate, (MACROBID) 100 MG capsule Take 1 capsule by mouth 2 times daily for 5 days, Disp-10 capsule, R-0Print           FOLLOW UP:    Yao House MD    Call   For a re-check in  2-3  days    [facility] Emergency Department  [address]  [address] [facility] [address]  [phone]  Go to   If symptoms worsen    FINAL IMPRESSION:    1. Shakiness    2.  Urinary tract infection without hematuria, site unspecified       DISPOSITION Decision To Discharge 06/25/2021 10:09:32 PM          Hiral Khalil MD  06/27/21 0009 Shashank Goel

## 2024-03-07 NOTE — PROGRESS NOTES
Hospitalist Progress Note      PCP: Murtaza Ochoa MD    Date of Admission: 3/17/2020    Chief Complaint: Patient was found down    Hospital Course: See H&P    Subjective:   Patient is up in bed, comfortable, not in distress. Denies any chest pain or shortness of breath. No new event overnight noted. Medications:  Reviewed    Infusion Medications     Scheduled Medications    aspirin  325 mg Oral Daily    atorvastatin  80 mg Oral Daily    azelastine  1 drop Both Eyes BID    clopidogrel  75 mg Oral Daily    sodium chloride flush  10 mL Intravenous 2 times per day    heparin (porcine)  5,000 Units Subcutaneous 3 times per day    levofloxacin  250 mg Intravenous Q24H     PRN Meds: sodium chloride flush, acetaminophen **OR** acetaminophen, promethazine **OR** ondansetron      Intake/Output Summary (Last 24 hours) at 3/20/2020 1236  Last data filed at 3/20/2020 1108  Gross per 24 hour   Intake 2346.18 ml   Output 3000 ml   Net -653.82 ml       Physical Exam Performed:    /64   Pulse 62   Temp 98.2 °F (36.8 °C) (Oral)   Resp 14   Wt 147 lb 0.8 oz (66.7 kg)   SpO2 98%   BMI 27.78 kg/m²     General appearance: No apparent distress, appears stated age and cooperative. HEENT: Pupils equal, round, and reactive to light. Conjunctivae/corneas clear. Neck: Supple, with full range of motion. No jugular venous distention. Trachea midline. Respiratory:  Normal respiratory effort. Clear to auscultation, bilaterally without Rales/Wheezes/Rhonchi. Cardiovascular: Regular rate and rhythm with normal S1/S2 without murmurs, rubs or gallops. Abdomen: Soft, non-tender, non-distended with normal bowel sounds. Musculoskeletal: No clubbing, cyanosis or edema bilaterally. Full range of motion without deformity. Skin: Skin color, texture, turgor normal.  No rashes or lesions. Neurologic:  Neurovascularly intact without any focal sensory/motor deficits.  Cranial nerves: II-XII intact, grossly non-focal.  Psychiatric: Alert and oriented, thought content appropriate, normal insight  Capillary Refill: Brisk,< 3 seconds   Peripheral Pulses: +2 palpable, equal bilaterally       Labs:   Recent Labs     03/18/20  0249 03/19/20  0431 03/20/20  0448   WBC 5.9 5.1 4.2   HGB 8.3* 7.6* 7.2*   HCT 23.4* 21.1* 20.8*   * 105* 102*     Recent Labs     03/18/20  1428 03/19/20  0431 03/19/20  0932 03/20/20  0448 03/20/20  1149   * 138  --  139  --    K 2.5* 3.2*  --  3.7  --    CL 96* 101  --  106  --    CO2 24 25  --  24  --    BUN 43* 32*  --  21*  --    CREATININE 1.1 0.8  --  0.6  --    CALCIUM 8.5 8.1*  --  7.6*  --    PHOS 1.3*  --  1.6*  --  1.7*     No results for input(s): AST, ALT, BILIDIR, BILITOT, ALKPHOS in the last 72 hours. No results for input(s): INR in the last 72 hours. Recent Labs     03/18/20  0249 03/19/20  0932   CKTOTAL 1,741* 1,000*   TROPONINI 0.04*  --        Urinalysis:      Lab Results   Component Value Date    NITRU Negative 03/17/2020    WBCUA see below 03/17/2020    BACTERIA 3+ 03/17/2020    RBCUA 3-4 03/17/2020    BLOODU LARGE 03/17/2020    SPECGRAV 1.025 03/17/2020    GLUCOSEU Negative 03/17/2020       Radiology:  CT Head WO Contrast   Final Result   Somewhat limited by motion artifact. No intracranial bleed. Contusion along the posterior right skull. CT Cervical Spine WO Contrast   Final Result   No acute abnormality of the cervical spine. XR KNEE RIGHT (1-2 VIEWS)   Final Result   No acute finding of the bilateral elbows, bilateral knees or left   tibia/fibula. XR KNEE LEFT (1-2 VIEWS)   Final Result   No acute finding of the bilateral elbows, bilateral knees or left   tibia/fibula. XR TIBIA FIBULA LEFT (2 VIEWS)   Final Result   No acute finding of the bilateral elbows, bilateral knees or left   tibia/fibula.          XR ELBOW RIGHT (MIN 3 VIEWS)   Final Result   No acute finding of the bilateral elbows, bilateral knees or left tibia/fibula. XR ELBOW LEFT (MIN 3 VIEWS)   Final Result   No acute finding of the bilateral elbows, bilateral knees or left   tibia/fibula. XR CHEST PORTABLE   Final Result   No significant findings or interval change. Assessment/Plan:    Active Hospital Problems    Diagnosis    DENAE (acute kidney injury) ([facility] [address] [N17.9]     Rhabdomyolysis 2/2 fall  - CK >1000 on admission  - started on IVF  - continue to trend CK levels  - no evidence of trauma on imaging  Clinically improving gradually, continue IV hydration. Nontraumatic rhabdomyolysis     DENAE  - 2/2 rhabdo vs prerenal  - started on IVF  - continue to monitor closely  Gradually improving with IV hydration, nephrology consulted. Clinically resolved.     UTI  - no evidence of sepsis  - noted on UA. Urine culture pending  - started on levaquin due to allergies  - continue IVF  Clinically improving.     Acute metabolic encephalopathy  - likely due to DENAE, UTI  - continue supportive treatment     Elevated troponin with known CAD  - mild. No chest pain reported  - likely 2/2 DNEAE and rhabdo. Low concern for ACS  - continue to trend trops for now  - continue home ASA, plavix, statin. Holding BB due to hypotension     Bradycardia  - unclear etiology. Appears to be asymptomatic at present  - holding home BB  Continue to monitor on telemetry.     Hyponatremia  - likely hypovolemic  - started on IVF  - continue to monitor.  Goal level around 129 in 24 hours     Hypokalemia  - monitor and replete     HTN  - hypotensive on admission  - holding home BP meds     HLD  - continue home statin    DVT Prophylaxis: Subcu heparin  Diet: DIET CARDIAC;  Code Status: Full Code    PT/OT Eval Status:  ordered    Dispo - 1 to 2 days    Mercedes Abraham MD No

## 2025-07-23 ENCOUNTER — HOSPITAL ENCOUNTER (INPATIENT)
Age: 80
LOS: 8 days | Discharge: HOSPICE | DRG: 469 | End: 2025-07-31
Attending: EMERGENCY MEDICINE | Admitting: STUDENT IN AN ORGANIZED HEALTH CARE EDUCATION/TRAINING PROGRAM
Payer: MEDICAID

## 2025-07-23 ENCOUNTER — APPOINTMENT (OUTPATIENT)
Dept: CT IMAGING | Age: 80
DRG: 469 | End: 2025-07-23
Payer: MEDICAID

## 2025-07-23 DIAGNOSIS — F41.9 ANXIETY: ICD-10-CM

## 2025-07-23 DIAGNOSIS — E87.0 HYPERNATREMIA: ICD-10-CM

## 2025-07-23 DIAGNOSIS — N17.9 ACUTE RENAL FAILURE, UNSPECIFIED ACUTE RENAL FAILURE TYPE: Primary | ICD-10-CM

## 2025-07-23 DIAGNOSIS — R52 PAIN: ICD-10-CM

## 2025-07-23 DIAGNOSIS — I95.9 HYPOTENSION, UNSPECIFIED HYPOTENSION TYPE: ICD-10-CM

## 2025-07-23 PROBLEM — R00.1 BRADYCARDIA: Status: ACTIVE | Noted: 2025-07-23

## 2025-07-23 PROBLEM — E87.6 HYPOKALEMIA: Status: ACTIVE | Noted: 2025-07-23

## 2025-07-23 LAB
ALBUMIN SERPL-MCNC: 3.7 G/DL (ref 3.4–5)
ALBUMIN/GLOB SERPL: 1.2 {RATIO} (ref 1.1–2.2)
ALP SERPL-CCNC: 141 U/L (ref 40–129)
ALT SERPL-CCNC: 38 U/L (ref 10–40)
ANION GAP SERPL CALCULATED.3IONS-SCNC: 15 MMOL/L (ref 3–16)
ANION GAP SERPL CALCULATED.3IONS-SCNC: 19 MMOL/L (ref 3–16)
ANION GAP SERPL CALCULATED.3IONS-SCNC: 19 MMOL/L (ref 3–16)
AST SERPL-CCNC: 36 U/L (ref 15–37)
BACTERIA URNS QL MICRO: ABNORMAL /HPF
BASOPHILS # BLD: 0 K/UL (ref 0–0.2)
BASOPHILS NFR BLD: 0.4 %
BILIRUB SERPL-MCNC: 0.4 MG/DL (ref 0–1)
BILIRUB UR QL STRIP.AUTO: ABNORMAL
BUN SERPL-MCNC: 163 MG/DL (ref 7–20)
BUN SERPL-MCNC: 169 MG/DL (ref 7–20)
BUN SERPL-MCNC: 172 MG/DL (ref 7–20)
CALCIUM SERPL-MCNC: 8.7 MG/DL (ref 8.3–10.6)
CALCIUM SERPL-MCNC: 9.5 MG/DL (ref 8.3–10.6)
CALCIUM SERPL-MCNC: 9.9 MG/DL (ref 8.3–10.6)
CHLORIDE SERPL-SCNC: 128 MMOL/L (ref 99–110)
CHLORIDE SERPL-SCNC: 130 MMOL/L (ref 99–110)
CHLORIDE SERPL-SCNC: 131 MMOL/L (ref 99–110)
CLARITY UR: ABNORMAL
CO2 SERPL-SCNC: 19 MMOL/L (ref 21–32)
CO2 SERPL-SCNC: 20 MMOL/L (ref 21–32)
CO2 SERPL-SCNC: 20 MMOL/L (ref 21–32)
COLOR UR: ABNORMAL
CREAT SERPL-MCNC: 5.6 MG/DL (ref 0.6–1.2)
CREAT SERPL-MCNC: 5.8 MG/DL (ref 0.6–1.2)
CREAT SERPL-MCNC: 6 MG/DL (ref 0.6–1.2)
CREAT UR-MCNC: 240 MG/DL (ref 28–259)
DEPRECATED RDW RBC AUTO: 15.8 % (ref 12.4–15.4)
EOSINOPHIL # BLD: 0.2 K/UL (ref 0–0.6)
EOSINOPHIL NFR BLD: 2.1 %
EPI CELLS #/AREA URNS AUTO: 19 /HPF (ref 0–5)
GFR SERPLBLD CREATININE-BSD FMLA CKD-EPI: 7 ML/MIN/{1.73_M2}
GLUCOSE SERPL-MCNC: 102 MG/DL (ref 70–99)
GLUCOSE SERPL-MCNC: 107 MG/DL (ref 70–99)
GLUCOSE SERPL-MCNC: 116 MG/DL (ref 70–99)
GLUCOSE UR STRIP.AUTO-MCNC: NEGATIVE MG/DL
HCT VFR BLD AUTO: 37 % (ref 36–48)
HGB BLD-MCNC: 12.2 G/DL (ref 12–16)
HGB UR QL STRIP.AUTO: NEGATIVE
HYALINE CASTS #/AREA URNS AUTO: 48 /LPF (ref 0–8)
HYALINE CASTS: PRESENT
KETONES UR STRIP.AUTO-MCNC: ABNORMAL MG/DL
LEUKOCYTE ESTERASE UR QL STRIP.AUTO: ABNORMAL
LYMPHOCYTES # BLD: 1.8 K/UL (ref 1–5.1)
LYMPHOCYTES NFR BLD: 21.1 %
MAGNESIUM SERPL-MCNC: 3.28 MG/DL (ref 1.8–2.4)
MCH RBC QN AUTO: 32.5 PG (ref 26–34)
MCHC RBC AUTO-ENTMCNC: 33 G/DL (ref 31–36)
MCV RBC AUTO: 98.2 FL (ref 80–100)
MONOCYTES # BLD: 0.7 K/UL (ref 0–1.3)
MONOCYTES NFR BLD: 7.7 %
NEUTROPHILS # BLD: 5.9 K/UL (ref 1.7–7.7)
NEUTROPHILS NFR BLD: 68.7 %
NITRITE UR QL STRIP.AUTO: NEGATIVE
PH UR STRIP.AUTO: 5 [PH] (ref 5–8)
PLATELET # BLD AUTO: 114 K/UL (ref 135–450)
PMV BLD AUTO: 10.9 FL (ref 5–10.5)
POTASSIUM SERPL-SCNC: 3.2 MMOL/L (ref 3.5–5.1)
POTASSIUM SERPL-SCNC: 3.4 MMOL/L (ref 3.5–5.1)
POTASSIUM SERPL-SCNC: 4.2 MMOL/L (ref 3.5–5.1)
POTASSIUM UR-SCNC: 34.5 MMOL/L
PROT SERPL-MCNC: 6.9 G/DL (ref 6.4–8.2)
PROT UR STRIP.AUTO-MCNC: 30 MG/DL
RBC # BLD AUTO: 3.77 M/UL (ref 4–5.2)
RBC CLUMPS #/AREA URNS AUTO: 2 /HPF (ref 0–4)
SODIUM SERPL-SCNC: 162 MMOL/L (ref 136–145)
SODIUM SERPL-SCNC: 169 MMOL/L (ref 136–145)
SODIUM SERPL-SCNC: 170 MMOL/L (ref 136–145)
SODIUM UR-SCNC: 21 MMOL/L
SP GR UR STRIP.AUTO: 1.02 (ref 1–1.03)
UA COMPLETE W REFLEX CULTURE PNL UR: YES
UA DIPSTICK W REFLEX MICRO PNL UR: YES
URN SPEC COLLECT METH UR: ABNORMAL
UROBILINOGEN UR STRIP-ACNC: 0.2 E.U./DL
WBC # BLD AUTO: 8.6 K/UL (ref 4–11)
WBC #/AREA URNS AUTO: 12 /HPF (ref 0–5)

## 2025-07-23 PROCEDURE — 83935 ASSAY OF URINE OSMOLALITY: CPT

## 2025-07-23 PROCEDURE — 93005 ELECTROCARDIOGRAM TRACING: CPT | Performed by: PHYSICIAN ASSISTANT

## 2025-07-23 PROCEDURE — 99285 EMERGENCY DEPT VISIT HI MDM: CPT

## 2025-07-23 PROCEDURE — 83735 ASSAY OF MAGNESIUM: CPT

## 2025-07-23 PROCEDURE — 36415 COLL VENOUS BLD VENIPUNCTURE: CPT

## 2025-07-23 PROCEDURE — 70450 CT HEAD/BRAIN W/O DYE: CPT

## 2025-07-23 PROCEDURE — 6370000000 HC RX 637 (ALT 250 FOR IP): Performed by: HOSPITALIST

## 2025-07-23 PROCEDURE — 6360000002 HC RX W HCPCS: Performed by: INTERNAL MEDICINE

## 2025-07-23 PROCEDURE — 2000000000 HC ICU R&B

## 2025-07-23 PROCEDURE — 87086 URINE CULTURE/COLONY COUNT: CPT

## 2025-07-23 PROCEDURE — 84300 ASSAY OF URINE SODIUM: CPT

## 2025-07-23 PROCEDURE — 96366 THER/PROPH/DIAG IV INF ADDON: CPT

## 2025-07-23 PROCEDURE — 74176 CT ABD & PELVIS W/O CONTRAST: CPT

## 2025-07-23 PROCEDURE — 6360000002 HC RX W HCPCS: Performed by: PHYSICIAN ASSISTANT

## 2025-07-23 PROCEDURE — 2100000000 HC CCU R&B

## 2025-07-23 PROCEDURE — 2580000003 HC RX 258: Performed by: INTERNAL MEDICINE

## 2025-07-23 PROCEDURE — 2500000003 HC RX 250 WO HCPCS: Performed by: HOSPITALIST

## 2025-07-23 PROCEDURE — 82570 ASSAY OF URINE CREATININE: CPT

## 2025-07-23 PROCEDURE — 83930 ASSAY OF BLOOD OSMOLALITY: CPT

## 2025-07-23 PROCEDURE — 81001 URINALYSIS AUTO W/SCOPE: CPT

## 2025-07-23 PROCEDURE — 84133 ASSAY OF URINE POTASSIUM: CPT

## 2025-07-23 PROCEDURE — 2580000003 HC RX 258: Performed by: HOSPITALIST

## 2025-07-23 PROCEDURE — 96365 THER/PROPH/DIAG IV INF INIT: CPT

## 2025-07-23 PROCEDURE — 85025 COMPLETE CBC W/AUTO DIFF WBC: CPT

## 2025-07-23 PROCEDURE — 80053 COMPREHEN METABOLIC PANEL: CPT

## 2025-07-23 RX ORDER — ACETAMINOPHEN 325 MG/1
650 TABLET ORAL 3 TIMES DAILY
Status: ON HOLD | COMMUNITY
End: 2025-07-31 | Stop reason: HOSPADM

## 2025-07-23 RX ORDER — AMMONIUM LACTATE 12 G/100G
CREAM TOPICAL 2 TIMES DAILY
Status: DISCONTINUED | OUTPATIENT
Start: 2025-07-23 | End: 2025-07-31 | Stop reason: HOSPADM

## 2025-07-23 RX ORDER — AMMONIUM LACTATE 12 G/100G
LOTION TOPICAL 2 TIMES DAILY
COMMUNITY

## 2025-07-23 RX ORDER — DEXTROSE MONOHYDRATE AND SODIUM CHLORIDE 5; .45 G/100ML; G/100ML
60 INJECTION, SOLUTION INTRAVENOUS CONTINUOUS
Status: ON HOLD | COMMUNITY
End: 2025-07-31 | Stop reason: HOSPADM

## 2025-07-23 RX ORDER — ASCORBIC ACID 500 MG
500 TABLET ORAL DAILY
COMMUNITY

## 2025-07-23 RX ORDER — MENTHOL AND ZINC OXIDE .44; 20.625 G/100G; G/100G
OINTMENT TOPICAL PRN
Status: ON HOLD | COMMUNITY
End: 2025-07-31 | Stop reason: HOSPADM

## 2025-07-23 RX ORDER — POLYVINYL ALCOHOL 14 MG/ML
1 SOLUTION/ DROPS OPHTHALMIC 2 TIMES DAILY
Status: DISCONTINUED | OUTPATIENT
Start: 2025-07-23 | End: 2025-07-31 | Stop reason: HOSPADM

## 2025-07-23 RX ORDER — THIAMINE MONONITRATE (VIT B1) 100 MG
100 TABLET ORAL DAILY
Status: ON HOLD | COMMUNITY
End: 2025-07-31 | Stop reason: HOSPADM

## 2025-07-23 RX ORDER — SELENIUM 50 MCG
1 TABLET ORAL DAILY
Status: ON HOLD | COMMUNITY
End: 2025-07-31 | Stop reason: HOSPADM

## 2025-07-23 RX ORDER — SODIUM CHLORIDE 450 MG/100ML
INJECTION, SOLUTION INTRAVENOUS CONTINUOUS
Status: DISCONTINUED | OUTPATIENT
Start: 2025-07-23 | End: 2025-07-24

## 2025-07-23 RX ORDER — POTASSIUM CHLORIDE 750 MG/1
10 TABLET, EXTENDED RELEASE ORAL DAILY
Status: ON HOLD | COMMUNITY
End: 2025-07-31 | Stop reason: HOSPADM

## 2025-07-23 RX ORDER — SODIUM CHLORIDE 0.9 % (FLUSH) 0.9 %
5-40 SYRINGE (ML) INJECTION PRN
Status: DISCONTINUED | OUTPATIENT
Start: 2025-07-23 | End: 2025-07-31 | Stop reason: HOSPADM

## 2025-07-23 RX ORDER — ONDANSETRON 4 MG/1
4 TABLET, ORALLY DISINTEGRATING ORAL EVERY 8 HOURS PRN
Status: DISCONTINUED | OUTPATIENT
Start: 2025-07-23 | End: 2025-07-31 | Stop reason: HOSPADM

## 2025-07-23 RX ORDER — HEPARIN SODIUM 5000 [USP'U]/ML
5000 INJECTION, SOLUTION INTRAVENOUS; SUBCUTANEOUS 2 TIMES DAILY
Status: DISCONTINUED | OUTPATIENT
Start: 2025-07-24 | End: 2025-07-31 | Stop reason: HOSPADM

## 2025-07-23 RX ORDER — VITAMIN B COMPLEX
2000 TABLET ORAL DAILY
Status: DISCONTINUED | OUTPATIENT
Start: 2025-07-24 | End: 2025-07-31 | Stop reason: HOSPADM

## 2025-07-23 RX ORDER — MENTHOL AND ZINC OXIDE .44; 20.625 G/100G; G/100G
OINTMENT TOPICAL 2 TIMES DAILY
Status: DISCONTINUED | OUTPATIENT
Start: 2025-07-23 | End: 2025-07-23 | Stop reason: RX

## 2025-07-23 RX ORDER — MENTHOL AND ZINC OXIDE .44; 20.625 G/100G; G/100G
OINTMENT TOPICAL 2 TIMES DAILY
Status: ON HOLD | COMMUNITY
End: 2025-07-31 | Stop reason: HOSPADM

## 2025-07-23 RX ORDER — POTASSIUM CHLORIDE 7.45 MG/ML
10 INJECTION INTRAVENOUS PRN
Status: DISCONTINUED | OUTPATIENT
Start: 2025-07-23 | End: 2025-07-23

## 2025-07-23 RX ORDER — KETOTIFEN FUMARATE 0.35 MG/ML
1 SOLUTION/ DROPS OPHTHALMIC 2 TIMES DAILY
Status: DISCONTINUED | OUTPATIENT
Start: 2025-07-23 | End: 2025-07-31 | Stop reason: HOSPADM

## 2025-07-23 RX ORDER — SENNA AND DOCUSATE SODIUM 50; 8.6 MG/1; MG/1
1 TABLET, FILM COATED ORAL NIGHTLY
COMMUNITY

## 2025-07-23 RX ORDER — ASCORBIC ACID 500 MG
500 TABLET ORAL DAILY
Status: DISCONTINUED | OUTPATIENT
Start: 2025-07-24 | End: 2025-07-31 | Stop reason: HOSPADM

## 2025-07-23 RX ORDER — GAUZE BANDAGE 2" X 2"
100 BANDAGE TOPICAL DAILY
Status: DISCONTINUED | OUTPATIENT
Start: 2025-07-24 | End: 2025-07-31 | Stop reason: HOSPADM

## 2025-07-23 RX ORDER — ACETAMINOPHEN 325 MG/1
650 TABLET ORAL EVERY 6 HOURS PRN
COMMUNITY

## 2025-07-23 RX ORDER — FERROUS SULFATE 325(65) MG
325 TABLET ORAL DAILY
Status: DISCONTINUED | OUTPATIENT
Start: 2025-07-24 | End: 2025-07-31 | Stop reason: HOSPADM

## 2025-07-23 RX ORDER — ACETAMINOPHEN 325 MG/1
650 TABLET ORAL 3 TIMES DAILY
Status: DISCONTINUED | OUTPATIENT
Start: 2025-07-23 | End: 2025-07-31 | Stop reason: HOSPADM

## 2025-07-23 RX ORDER — ACETAMINOPHEN 325 MG/1
650 TABLET ORAL EVERY 6 HOURS PRN
Status: DISCONTINUED | OUTPATIENT
Start: 2025-07-23 | End: 2025-07-31 | Stop reason: HOSPADM

## 2025-07-23 RX ORDER — BUSPIRONE HYDROCHLORIDE 5 MG/1
15 TABLET ORAL 2 TIMES DAILY
Status: DISCONTINUED | OUTPATIENT
Start: 2025-07-23 | End: 2025-07-31 | Stop reason: HOSPADM

## 2025-07-23 RX ORDER — ONDANSETRON 2 MG/ML
4 INJECTION INTRAMUSCULAR; INTRAVENOUS EVERY 6 HOURS PRN
Status: DISCONTINUED | OUTPATIENT
Start: 2025-07-23 | End: 2025-07-31 | Stop reason: HOSPADM

## 2025-07-23 RX ORDER — ACETAMINOPHEN 650 MG/1
650 SUPPOSITORY RECTAL EVERY 6 HOURS PRN
Status: DISCONTINUED | OUTPATIENT
Start: 2025-07-23 | End: 2025-07-31 | Stop reason: HOSPADM

## 2025-07-23 RX ORDER — LEVOFLOXACIN 5 MG/ML
250 INJECTION, SOLUTION INTRAVENOUS
Status: DISCONTINUED | OUTPATIENT
Start: 2025-07-25 | End: 2025-07-23 | Stop reason: SDUPTHER

## 2025-07-23 RX ORDER — LEVOFLOXACIN 5 MG/ML
250 INJECTION, SOLUTION INTRAVENOUS
Status: DISCONTINUED | OUTPATIENT
Start: 2025-07-23 | End: 2025-07-28

## 2025-07-23 RX ORDER — POTASSIUM CHLORIDE 7.45 MG/ML
10 INJECTION INTRAVENOUS
Status: COMPLETED | OUTPATIENT
Start: 2025-07-23 | End: 2025-07-23

## 2025-07-23 RX ORDER — PANTOPRAZOLE SODIUM 20 MG/1
20 TABLET, DELAYED RELEASE ORAL DAILY
Status: ON HOLD | COMMUNITY
End: 2025-07-31 | Stop reason: HOSPADM

## 2025-07-23 RX ORDER — BUSPIRONE HYDROCHLORIDE 15 MG/1
15 TABLET ORAL 2 TIMES DAILY
Status: ON HOLD | COMMUNITY
End: 2025-07-31 | Stop reason: HOSPADM

## 2025-07-23 RX ORDER — SODIUM CHLORIDE 450 MG/100ML
INJECTION, SOLUTION INTRAVENOUS CONTINUOUS
Status: ACTIVE | OUTPATIENT
Start: 2025-07-23 | End: 2025-07-23

## 2025-07-23 RX ORDER — SENNA AND DOCUSATE SODIUM 50; 8.6 MG/1; MG/1
1 TABLET, FILM COATED ORAL NIGHTLY
Status: DISCONTINUED | OUTPATIENT
Start: 2025-07-23 | End: 2025-07-31 | Stop reason: HOSPADM

## 2025-07-23 RX ORDER — POLYETHYLENE GLYCOL 3350 17 G/17G
17 POWDER, FOR SOLUTION ORAL DAILY PRN
Status: DISCONTINUED | OUTPATIENT
Start: 2025-07-23 | End: 2025-07-31 | Stop reason: HOSPADM

## 2025-07-23 RX ORDER — L. ACIDOPHILUS/L.BULGARICUS 100MM CELL
1 GRANULES IN PACKET (EA) ORAL DAILY
Status: DISCONTINUED | OUTPATIENT
Start: 2025-07-24 | End: 2025-07-31 | Stop reason: HOSPADM

## 2025-07-23 RX ORDER — SODIUM CHLORIDE 0.9 % (FLUSH) 0.9 %
5-40 SYRINGE (ML) INJECTION EVERY 12 HOURS SCHEDULED
Status: DISCONTINUED | OUTPATIENT
Start: 2025-07-23 | End: 2025-07-31 | Stop reason: HOSPADM

## 2025-07-23 RX ORDER — SODIUM CHLORIDE 9 MG/ML
INJECTION, SOLUTION INTRAVENOUS PRN
Status: DISCONTINUED | OUTPATIENT
Start: 2025-07-23 | End: 2025-07-31 | Stop reason: HOSPADM

## 2025-07-23 RX ORDER — DEXTROSE MONOHYDRATE 50 MG/ML
INJECTION, SOLUTION INTRAVENOUS CONTINUOUS
Status: DISCONTINUED | OUTPATIENT
Start: 2025-07-23 | End: 2025-07-23

## 2025-07-23 RX ORDER — CLOPIDOGREL BISULFATE 75 MG/1
75 TABLET ORAL DAILY
Status: DISCONTINUED | OUTPATIENT
Start: 2025-07-24 | End: 2025-07-31 | Stop reason: HOSPADM

## 2025-07-23 RX ORDER — LOPERAMIDE HYDROCHLORIDE 2 MG/1
2 CAPSULE ORAL EVERY 4 HOURS PRN
COMMUNITY

## 2025-07-23 RX ORDER — MAGNESIUM SULFATE IN WATER 40 MG/ML
2000 INJECTION, SOLUTION INTRAVENOUS PRN
Status: DISCONTINUED | OUTPATIENT
Start: 2025-07-23 | End: 2025-07-23

## 2025-07-23 RX ORDER — POTASSIUM CHLORIDE 29.8 MG/ML
20 INJECTION INTRAVENOUS PRN
Status: DISCONTINUED | OUTPATIENT
Start: 2025-07-23 | End: 2025-07-23

## 2025-07-23 RX ORDER — KETOTIFEN FUMARATE 0.35 MG/ML
1 SOLUTION/ DROPS OPHTHALMIC 2 TIMES DAILY
Status: ON HOLD | COMMUNITY
End: 2025-07-31 | Stop reason: HOSPADM

## 2025-07-23 RX ORDER — PANTOPRAZOLE SODIUM 40 MG/1
40 TABLET, DELAYED RELEASE ORAL DAILY
Status: DISCONTINUED | OUTPATIENT
Start: 2025-07-24 | End: 2025-07-31 | Stop reason: HOSPADM

## 2025-07-23 RX ADMIN — SODIUM CHLORIDE: 4.5 INJECTION, SOLUTION INTRAVENOUS at 21:25

## 2025-07-23 RX ADMIN — POTASSIUM CHLORIDE 10 MEQ: 7.46 INJECTION, SOLUTION INTRAVENOUS at 17:40

## 2025-07-23 RX ADMIN — POLYVINYL ALCOHOL 1 DROP: 1.4 SOLUTION/ DROPS OPHTHALMIC at 21:59

## 2025-07-23 RX ADMIN — SODIUM CHLORIDE: 4.5 INJECTION, SOLUTION INTRAVENOUS at 17:14

## 2025-07-23 RX ADMIN — BUSPIRONE HYDROCHLORIDE 15 MG: 5 TABLET ORAL at 22:00

## 2025-07-23 RX ADMIN — SODIUM CHLORIDE, PRESERVATIVE FREE 10 ML: 5 INJECTION INTRAVENOUS at 22:00

## 2025-07-23 RX ADMIN — ACETAMINOPHEN 650 MG: 325 TABLET ORAL at 22:00

## 2025-07-23 RX ADMIN — AMMONIUM LACTATE: 120 CREAM TOPICAL at 21:59

## 2025-07-23 RX ADMIN — LEVOFLOXACIN 250 MG: 5 INJECTION, SOLUTION INTRAVENOUS at 20:18

## 2025-07-23 RX ADMIN — SODIUM CHLORIDE: 4.5 INJECTION, SOLUTION INTRAVENOUS at 17:42

## 2025-07-23 RX ADMIN — KETOTIFEN FUMARATE 1 DROP: 0.25 SOLUTION/ DROPS OPHTHALMIC at 21:59

## 2025-07-23 RX ADMIN — STANDARDIZED SENNA CONCENTRATE AND DOCUSATE SODIUM 1 TABLET: 8.6; 5 TABLET ORAL at 22:05

## 2025-07-23 RX ADMIN — POTASSIUM CHLORIDE 10 MEQ: 7.46 INJECTION, SOLUTION INTRAVENOUS at 19:02

## 2025-07-23 NOTE — H&P
V2.0  History and Physical      Name:  Nica Ram /Age/Sex: 1945  (79 y.o. female)   MRN & CSN:  [id number] & [id number] Encounter Date/Time: 2025 7:44 PM EDT   Location:  ED PCP: Damon Patel MD       Hospital Day: 1    Assessment and Plan:   Nica Ram is a 79 y.o. female with a pmh of late stage Alzheimer disease; and hypertension who lives at a nursing home and who presents with DENAE (acute kidney injury)    Hospital Problems           Last Modified POA    * (Principal) DENAE (acute kidney injury) 2025 Yes    Hypokalemia 2025 Yes    Hypernatremia 2025 Yes    Alzheimer's disease (HCC) 2025 Yes    Bradycardia 2025 Yes       Plan:  Nephrology consult at the ED started on half-normal saline in the emergency department and continued.  Trend electrolytes.  Hold home losartan and hydrochlorothiazide.  Hold home atenolol.  Urinalysis at the ED was abnormal.  Started on Levaquin renally dosed at the emergency department and will continue.      Advance care planning:  From nursing home records patient is a full code.  Disposition:   Current Living situation: Extended-care facility  Expected Disposition: Extended-care facility  Estimated D/C: 4 days    Diet Regular diet   DVT Prophylaxis [] Lovenox, [x]  Heparin, [] SCDs, [] Ambulation,  [] Eliquis, [] Xarelto, [] Coumadin   Code Status Full code   Surrogate Decision Maker/ POA Fredy Ram medical power of  who is granddaughter ([phone])     Personally reviewed Lab Studies and Imaging     Discussed management of the case with  ED provider who recommended admission    EKG interpreted personally and results sinus bradycardia with diffuse T wave inversion    Imaging that was interpreted by radiology includes CT head without contrast and results: \" Likely no acute intracranial findings. 2.  Interval growth of a large extra-axial mass of the right sphenoid/clivus with mass effect on the rohith, right

## 2025-07-23 NOTE — CONSULTS
[facility]  Consultation Note    Reason for Consult: Hypernatremia, hypokalemia, severe DENAE  Requesting Physician:  EUGENIO Day PA-C    CHIEF COMPLAINT: Low blood pressure and low potassium.    History obtained from records and patient.    HISTORY OF PRESENT ILLNESS:                Nica Ram  is 79 y.o. y.o. female with significant past medical history of acute myocardial infarction, Alzheimer's disease, hypertension, osteopenia, hyperlipidemia who presents with low blood pressure from ECF.  Potassium was reportedly 3.1.  Labs here in the ER showed a sodium of 170, potassium of 3.2, serum bicarbonate of 20, BUN of 169 and creatinine of 5.8.  Creatinine from 2021 was 0.8.  Systolic blood pressure 90s to 120s.  Outpatient medications includes potassium chloride 10 mEq daily, losartan HCTZ, statin.  Not able to verbalize well.    Past Medical History:     has a past medical history of Acute myocardial infarction, unspecified site, episode of care unspecified, Alzheimer's disease (HCC), Anxiety state, unspecified, Coronary atherosclerosis of unspecified type of vessel, native or graft, Essential hypertension, benign, Gastritis, Osteopenia, Other and unspecified hyperlipidemia, Screening mammogram, Severe Osteopenia, and Unspecified hearing loss.   Past Surgical History:     has a past surgical history that includes Percutaneous Transluminal Coronary Angio; Upper gastrointestinal endoscopy (11/18/14); and Upper gastrointestinal endoscopy (N/A, 3/26/2020).   Current Medications:    Current Facility-Administered Medications: 0.45 % sodium chloride infusion, , IntraVENous, Continuous  potassium chloride 10 mEq/100 mL IVPB (Peripheral Line), 10 mEq, IntraVENous, Q1H  Allergies:    Alcohol, Amlodipine, Cephalosporins, Tetracycline, Vitamins a & d, Vitamins a & d, and Penicillins   Social History:     reports that she quit smoking about 28 years ago. Her smoking use included cigarettes. She

## 2025-07-23 NOTE — ED PROVIDER NOTES
In addition to the advanced practice provider, I personally saw Nica Ram and performed a substantive portion of the visit including all aspects of the medical decision making. I made/approved the management plan and take responsibility for the patient management    Briefly, this is a 79 y.o. female here for to the ER for evaluation of positive dementia positive generalized fatigue and weakness, altered mental status..    On exam, poor informant, no rebound or guarding the abdomen.  No pulse deficit    EKG  EKG was reviewed by emergency department physician in the absence of a cardiologist    Narrow complex sinus rhythm, rate , normal axis, normal ID and QRS intervals, normal Qtc, no ST elevations or depressions, normal t-wave morphology, impression NSR, no STEMI      Screenings   [place] Coma Scale  Eye Opening: Spontaneous  Best Verbal Response: Confused  Best Motor Response: Obeys commands  [place] Coma Scale Score: 14          MDM  Patient presents ER for evaluation of altered mental status with evidence of acute renal failure, mild acidemia, positive evidence of urinary tract infection.  Consultation with nephrology as well as admitting physicians    I Dr. Rodriguez am the primary clinician of record.        Patient Referrals:  No follow-up provider specified.    Discharge Medications:  Current Discharge Medication List          FINAL IMPRESSION  1. Acute renal failure, unspecified acute renal failure type    2. Hypernatremia    3. Hypotension, unspecified hypotension type        Blood pressure (!) 102/50, pulse 55, temperature 97 °F (36.1 °C), temperature source Temporal, resp. rate 16, height 1.6 m (5' 2.99\"), weight 47.6 kg (105 lb), SpO2 99%.     For further details of Nica Ram's emergency department encounter, please see documentation by advanced practice provider, RR.    ARMIN RODRIGUEZ MD (electronically signed)  Attending Emergency Physician       Armin Rodriguez MD  07/29/25

## 2025-07-23 NOTE — PROGRESS NOTES
Patient Name: Nica Ram  : 1945 79 y.o.  MRN: [id number]  ED Room #: ED-0030/30     Chief complaint:   Chief Complaint   Patient presents with    Abnormal Lab     Sent by everett from [facility] for a K 3.1 from routine labs, hx alzheimer, pt at baseline     Hospital Problem/Diagnosis:       O2 Flow Rate:O2 Device: None (Room air)   (if applicable)  Cardiac Rhythm:   (if applicable)  Active LDA's:   Peripheral IV 25 Right Antecubital (Active)   Site Assessment Clean, dry & intact 25 1532   Line Status Blood return noted 25 1532       Peripheral IV 25 Left Antecubital (Active)   Site Assessment Clean, dry & intact 25 1727   Line Status Blood return noted 25 1727      Urinary Catheter 25 2 Way (Active)   $ Urethral catheter insertion $ Not inserted for procedure 25 1731   Catheter Indications Need for fluid volume management of the critically ill patient in a critical care setting 25 1731   Site Assessment Red 25 1731   Urine Color Yellow 25 1731   Urine Appearance Clear 25 1731   Collection Container Standard 25 1731   Securement Method Securing device (Describe) 25 1731   Catheter Care  Perineal wipes 25 1731   Status Draining 25 1731          How does patient ambulate? Unknown, did not assess in the Emergency Department    2. How does patient take pills? Unknown, no oral medications were given in the Emergency Department    3. Is patient alert? Alert    4. Is patient oriented? To Person    5.   Patient arrived from:  nursing home  Facility Name: ___________________________________________    6. If patient is disoriented or from a Skill Nursing Facility has family been notified of admission? Yes    7. Patient belongings?     Disposition of belongings? Kept with Patient     8. Any specific patient or family belongings/needs/dynamics?   a.     9. Miscellaneous comments/pending orders?  a. Alzheimer's,

## 2025-07-23 NOTE — PROGRESS NOTES
Pharmacy Home Medication Reconciliation Note    A medication reconciliation has been completed for Nica Ram 1945    Pharmacy: [facility] [address], [place], [place]  Information provided by: facility    The patient's home medication list is as follows:  No current facility-administered medications on file prior to encounter.     Current Outpatient Medications on File Prior to Encounter   Medication Sig Dispense Refill    acetaminophen (TYLENOL) 325 MG tablet Take 2 tablets by mouth in the morning, at noon, and at bedtime      Lactobacillus (ACIDOPHILUS) CAPS capsule Take 1 capsule by mouth daily      acetaminophen (TYLENOL) 325 MG tablet Take 2 tablets by mouth every 6 hours as needed for Pain      ammonium lactate (LAC-HYDRIN) 12 % lotion Apply topically 2 times daily Apply to bilateral legs      busPIRone (BUSPAR) 15 MG tablet Take 15 mg by mouth 2 times daily      menthol-zinc oxide (CALMOSEPTINE) 0.44-20.625 % OINT ointment Apply topically as needed (incontinence) Apply to buttock, sacrum, and periarea topically as needed for episodes of incontinence.      menthol-zinc oxide (CALMOSEPTINE) 0.44-20.625 % OINT ointment Apply topically 2 times daily Apply to buttock, sacrum, and periarea topically every day and night shift.      Dextrose-Sodium Chloride (DEXTROSE 5 % AND 0.45 % NACL) 5-0.45 % infusion Infuse 60 mL/hr intravenously continuous      ketotifen fumarate (ZADITOR) 0.035 % ophthalmic solution Place 1 drop into both eyes 2 times daily      loperamide (IMODIUM) 2 MG capsule Take 1 capsule by mouth every 4 hours as needed for Diarrhea      magnesium hydroxide (MILK OF MAGNESIA) 400 MG/5ML suspension Take 30 mLs by mouth daily as needed for Constipation      pantoprazole (PROTONIX) 20 MG tablet Take 1 tablet by mouth daily      potassium chloride (KLOR-CON 10) 10 MEQ extended release tablet Take 1 tablet by mouth daily      sennosides-docusate sodium (SENOKOT-S) 8.6-50 MG tablet

## 2025-07-24 LAB
ANION GAP SERPL CALCULATED.3IONS-SCNC: 11 MMOL/L (ref 3–16)
ANION GAP SERPL CALCULATED.3IONS-SCNC: 12 MMOL/L (ref 3–16)
ANION GAP SERPL CALCULATED.3IONS-SCNC: 13 MMOL/L (ref 3–16)
ANION GAP SERPL CALCULATED.3IONS-SCNC: 13 MMOL/L (ref 3–16)
ANION GAP SERPL CALCULATED.3IONS-SCNC: 14 MMOL/L (ref 3–16)
ANION GAP SERPL CALCULATED.3IONS-SCNC: 15 MMOL/L (ref 3–16)
BACTERIA UR CULT: NORMAL
BASE EXCESS BLDV CALC-SCNC: -12.9 MMOL/L (ref -3–3)
BASOPHILS # BLD: 0 K/UL (ref 0–0.2)
BASOPHILS NFR BLD: 0.3 %
BUN SERPL-MCNC: 122 MG/DL (ref 7–20)
BUN SERPL-MCNC: 132 MG/DL (ref 7–20)
BUN SERPL-MCNC: 137 MG/DL (ref 7–20)
BUN SERPL-MCNC: 143 MG/DL (ref 7–20)
BUN SERPL-MCNC: 145 MG/DL (ref 7–20)
BUN SERPL-MCNC: 149 MG/DL (ref 7–20)
CALCIUM SERPL-MCNC: 6.1 MG/DL (ref 8.3–10.6)
CALCIUM SERPL-MCNC: 6.8 MG/DL (ref 8.3–10.6)
CALCIUM SERPL-MCNC: 7 MG/DL (ref 8.3–10.6)
CALCIUM SERPL-MCNC: 7.7 MG/DL (ref 8.3–10.6)
CALCIUM SERPL-MCNC: 7.7 MG/DL (ref 8.3–10.6)
CALCIUM SERPL-MCNC: 7.8 MG/DL (ref 8.3–10.6)
CHLORIDE SERPL-SCNC: 121 MMOL/L (ref 99–110)
CHLORIDE SERPL-SCNC: 127 MMOL/L (ref 99–110)
CHLORIDE SERPL-SCNC: 128 MMOL/L (ref 99–110)
CHLORIDE SERPL-SCNC: 130 MMOL/L (ref 99–110)
CO2 BLDV-SCNC: 29 MMOL/L
CO2 SERPL-SCNC: 11 MMOL/L (ref 21–32)
CO2 SERPL-SCNC: 14 MMOL/L (ref 21–32)
CO2 SERPL-SCNC: 14 MMOL/L (ref 21–32)
CO2 SERPL-SCNC: 17 MMOL/L (ref 21–32)
CO2 SERPL-SCNC: 17 MMOL/L (ref 21–32)
CO2 SERPL-SCNC: 19 MMOL/L (ref 21–32)
COHGB MFR BLDV: 3.9 % (ref 0–1.5)
CREAT SERPL-MCNC: 3.9 MG/DL (ref 0.6–1.2)
CREAT SERPL-MCNC: 4.2 MG/DL (ref 0.6–1.2)
CREAT SERPL-MCNC: 4.5 MG/DL (ref 0.6–1.2)
CREAT SERPL-MCNC: 4.7 MG/DL (ref 0.6–1.2)
CREAT SERPL-MCNC: 5 MG/DL (ref 0.6–1.2)
CREAT SERPL-MCNC: 5.1 MG/DL (ref 0.6–1.2)
DEPRECATED RDW RBC AUTO: 15.5 % (ref 12.4–15.4)
EOSINOPHIL # BLD: 0.1 K/UL (ref 0–0.6)
EOSINOPHIL NFR BLD: 2.2 %
GFR SERPLBLD CREATININE-BSD FMLA CKD-EPI: 10 ML/MIN/{1.73_M2}
GFR SERPLBLD CREATININE-BSD FMLA CKD-EPI: 11 ML/MIN/{1.73_M2}
GFR SERPLBLD CREATININE-BSD FMLA CKD-EPI: 8 ML/MIN/{1.73_M2}
GFR SERPLBLD CREATININE-BSD FMLA CKD-EPI: 8 ML/MIN/{1.73_M2}
GFR SERPLBLD CREATININE-BSD FMLA CKD-EPI: 9 ML/MIN/{1.73_M2}
GFR SERPLBLD CREATININE-BSD FMLA CKD-EPI: 9 ML/MIN/{1.73_M2}
GLUCOSE SERPL-MCNC: 101 MG/DL (ref 70–99)
GLUCOSE SERPL-MCNC: 111 MG/DL (ref 70–99)
GLUCOSE SERPL-MCNC: 164 MG/DL (ref 70–99)
GLUCOSE SERPL-MCNC: 72 MG/DL (ref 70–99)
GLUCOSE SERPL-MCNC: 80 MG/DL (ref 70–99)
GLUCOSE SERPL-MCNC: 94 MG/DL (ref 70–99)
HCO3 BLDV-SCNC: 12 MMOL/L (ref 23–29)
HCT VFR BLD AUTO: 27.1 % (ref 36–48)
HGB BLD-MCNC: 9.1 G/DL (ref 12–16)
LYMPHOCYTES # BLD: 1 K/UL (ref 1–5.1)
LYMPHOCYTES NFR BLD: 20.3 %
MCH RBC QN AUTO: 32.5 PG (ref 26–34)
MCHC RBC AUTO-ENTMCNC: 33.7 G/DL (ref 31–36)
MCV RBC AUTO: 96.4 FL (ref 80–100)
METHGB MFR BLDV: 0.2 %
MONOCYTES # BLD: 0.5 K/UL (ref 0–1.3)
MONOCYTES NFR BLD: 9.5 %
NEUTROPHILS # BLD: 3.3 K/UL (ref 1.7–7.7)
NEUTROPHILS NFR BLD: 67.7 %
O2 CT VFR BLDV CALC: 10 VOL %
O2 THERAPY: ABNORMAL
OSMOLALITY SERPL: 421 MOSM/KG (ref 280–301)
OSMOLALITY UR: 476 MOSM/KG (ref 390–1070)
PCO2 BLDV: 23.6 MMHG (ref 40–50)
PH BLDV: 7.31 [PH] (ref 7.35–7.45)
PLATELET # BLD AUTO: 59 K/UL (ref 135–450)
PLATELET BLD QL SMEAR: ABNORMAL
PMV BLD AUTO: 10.1 FL (ref 5–10.5)
PO2 BLDV: 184 MMHG (ref 25–40)
POTASSIUM SERPL-SCNC: 3.1 MMOL/L (ref 3.5–5.1)
POTASSIUM SERPL-SCNC: 3.5 MMOL/L (ref 3.5–5.1)
POTASSIUM SERPL-SCNC: 3.5 MMOL/L (ref 3.5–5.1)
POTASSIUM SERPL-SCNC: 3.7 MMOL/L (ref 3.5–5.1)
POTASSIUM SERPL-SCNC: 3.8 MMOL/L (ref 3.5–5.1)
POTASSIUM SERPL-SCNC: 5.2 MMOL/L (ref 3.5–5.1)
RBC # BLD AUTO: 2.81 M/UL (ref 4–5.2)
SAO2 % BLDV: 100 %
SLIDE REVIEW: ABNORMAL
SODIUM SERPL-SCNC: 152 MMOL/L (ref 136–145)
SODIUM SERPL-SCNC: 154 MMOL/L (ref 136–145)
SODIUM SERPL-SCNC: 154 MMOL/L (ref 136–145)
SODIUM SERPL-SCNC: 155 MMOL/L (ref 136–145)
SODIUM SERPL-SCNC: 157 MMOL/L (ref 136–145)
SODIUM SERPL-SCNC: 158 MMOL/L (ref 136–145)
WBC # BLD AUTO: 4.8 K/UL (ref 4–11)

## 2025-07-24 PROCEDURE — 6360000002 HC RX W HCPCS: Performed by: NURSE PRACTITIONER

## 2025-07-24 PROCEDURE — 80048 BASIC METABOLIC PNL TOTAL CA: CPT

## 2025-07-24 PROCEDURE — 2580000003 HC RX 258: Performed by: INTERNAL MEDICINE

## 2025-07-24 PROCEDURE — 85025 COMPLETE CBC W/AUTO DIFF WBC: CPT

## 2025-07-24 PROCEDURE — 6360000002 HC RX W HCPCS: Performed by: HOSPITALIST

## 2025-07-24 PROCEDURE — 2500000003 HC RX 250 WO HCPCS: Performed by: INTERNAL MEDICINE

## 2025-07-24 PROCEDURE — 36415 COLL VENOUS BLD VENIPUNCTURE: CPT

## 2025-07-24 PROCEDURE — 2000000000 HC ICU R&B

## 2025-07-24 PROCEDURE — 6370000000 HC RX 637 (ALT 250 FOR IP): Performed by: HOSPITALIST

## 2025-07-24 PROCEDURE — 82803 BLOOD GASES ANY COMBINATION: CPT

## 2025-07-24 PROCEDURE — 2500000003 HC RX 250 WO HCPCS: Performed by: HOSPITALIST

## 2025-07-24 RX ORDER — POTASSIUM CHLORIDE 7.45 MG/ML
10 INJECTION INTRAVENOUS
Status: COMPLETED | OUTPATIENT
Start: 2025-07-24 | End: 2025-07-24

## 2025-07-24 RX ORDER — SODIUM CHLORIDE 9 MG/ML
INJECTION, SOLUTION INTRAVENOUS CONTINUOUS
Status: DISCONTINUED | OUTPATIENT
Start: 2025-07-24 | End: 2025-07-24

## 2025-07-24 RX ORDER — MIDODRINE HYDROCHLORIDE 5 MG/1
5 TABLET ORAL
Status: DISCONTINUED | OUTPATIENT
Start: 2025-07-24 | End: 2025-07-25

## 2025-07-24 RX ADMIN — POTASSIUM CHLORIDE 10 MEQ: 7.45 INJECTION INTRAVENOUS at 05:34

## 2025-07-24 RX ADMIN — POTASSIUM CHLORIDE 10 MEQ: 7.45 INJECTION INTRAVENOUS at 07:15

## 2025-07-24 RX ADMIN — PANTOPRAZOLE SODIUM 40 MG: 40 TABLET, DELAYED RELEASE ORAL at 05:33

## 2025-07-24 RX ADMIN — POTASSIUM CHLORIDE 10 MEQ: 7.45 INJECTION INTRAVENOUS at 02:45

## 2025-07-24 RX ADMIN — KETOTIFEN FUMARATE 1 DROP: 0.25 SOLUTION/ DROPS OPHTHALMIC at 21:05

## 2025-07-24 RX ADMIN — SODIUM BICARBONATE: 84 INJECTION, SOLUTION INTRAVENOUS at 13:25

## 2025-07-24 RX ADMIN — SODIUM CHLORIDE, PRESERVATIVE FREE 10 ML: 5 INJECTION INTRAVENOUS at 21:12

## 2025-07-24 RX ADMIN — POTASSIUM CHLORIDE 10 MEQ: 7.45 INJECTION INTRAVENOUS at 01:41

## 2025-07-24 RX ADMIN — AMMONIUM LACTATE: 120 CREAM TOPICAL at 21:12

## 2025-07-24 RX ADMIN — POTASSIUM CHLORIDE 10 MEQ: 7.45 INJECTION INTRAVENOUS at 09:38

## 2025-07-24 RX ADMIN — SODIUM CHLORIDE, PRESERVATIVE FREE 10 ML: 5 INJECTION INTRAVENOUS at 09:34

## 2025-07-24 RX ADMIN — Medication: at 01:40

## 2025-07-24 RX ADMIN — BUSPIRONE HYDROCHLORIDE 15 MG: 5 TABLET ORAL at 21:04

## 2025-07-24 RX ADMIN — Medication: at 09:36

## 2025-07-24 RX ADMIN — ACETAMINOPHEN 650 MG: 325 TABLET ORAL at 21:04

## 2025-07-24 RX ADMIN — STANDARDIZED SENNA CONCENTRATE AND DOCUSATE SODIUM 1 TABLET: 8.6; 5 TABLET ORAL at 21:04

## 2025-07-24 RX ADMIN — AMMONIUM LACTATE: 120 CREAM TOPICAL at 09:35

## 2025-07-24 RX ADMIN — SODIUM CHLORIDE: 0.9 INJECTION, SOLUTION INTRAVENOUS at 10:32

## 2025-07-24 RX ADMIN — POTASSIUM CHLORIDE 10 MEQ: 7.45 INJECTION INTRAVENOUS at 03:47

## 2025-07-24 RX ADMIN — Medication: at 21:13

## 2025-07-24 RX ADMIN — POLYVINYL ALCOHOL 1 DROP: 1.4 SOLUTION/ DROPS OPHTHALMIC at 21:05

## 2025-07-24 NOTE — PROGRESS NOTES
[facility] Note    Patient Active Problem List   Diagnosis    Anxiety state    Essential hypertension, benign    Hearing loss    Vitamin D deficiency    Severe Osteopenia    Abdominal aortic aneurysm    Hyperlipidemia    Old myocardial infarction    Peptic ulcer    Postsurgical percutaneous transluminal coronary angioplasty status    Cataracts, bilateral    Vascular disease    DENAE (acute kidney injury)    Hypokalemia    Hypernatremia    Alzheimer's disease (HCC)    Bradycardia       Past Medical History:   has a past medical history of Acute myocardial infarction, unspecified site, episode of care unspecified, Alzheimer's disease (HCC), Anxiety state, unspecified, Coronary atherosclerosis of unspecified type of vessel, native or graft, Essential hypertension, benign, Gastritis, Osteopenia, Other and unspecified hyperlipidemia, Screening mammogram, Severe Osteopenia, and Unspecified hearing loss.    Past Social History:   reports that she quit smoking about 28 years ago. Her smoking use included cigarettes. She started smoking about 36 years ago. She has a 8 pack-year smoking history. She has never used smokeless tobacco. She reports that she does not currently use alcohol. She reports that she does not use drugs.    Subjective:    Awake but confused  Review of Systems  Not able to fully cooperate  Objective:    24H urine output 100ml  BP (!) 82/68   Pulse (!) 46   Temp 97.1 °F (36.2 °C) (Temporal)   Resp 16   Wt 42.2 kg (93 lb 0.6 oz)   SpO2 93%   BMI 16.48 kg/m²     Wt Readings from Last 3 Encounters:   07/24/25 42.2 kg (93 lb 0.6 oz)   09/20/20 72.6 kg (160 lb)   03/30/20 71.9 kg (158 lb 8.2 oz)       BP Readings from Last 3 Encounters:   07/24/25 (!) 82/68   06/25/21 126/70   09/20/20 (!) 143/53         Chest- clear  Heart-regular  Abd-soft  Ext- no edema    Labs  Hemoglobin   Date Value Ref Range Status   07/24/2025 9.1 (L) 12.0 - 16.0 g/dL Final     Hematocrit   Date Value Ref

## 2025-07-24 NOTE — PROGRESS NOTES
Pt admitted to CVU 15. Orders released. Attached to monitor. Primary Paramjit MULLER    Electronically signed by Mallory Bonds RN on 7/23/2025 at 8:45 PM

## 2025-07-24 NOTE — ED PROVIDER NOTES
[facility]  EMERGENCY DEPARTMENT ENCOUNTER        Pt Name: Nica Ram  MRN: [id number]  Birthdate 1945  Date of evaluation: 7/23/2025  Provider: NELLIE Bonner  PCP: Damon Patel MD  Note Started: 11:40 PM EDT 7/23/25       I have seen and evaluated this patient with my supervising physician Armin Jiang MD.      CHIEF COMPLAINT       Chief Complaint   Patient presents with    Abnormal Lab     Sent by everett from [facility] for a K 3.1 from routine labs, hx alzheimer, pt at baseline       HISTORY OF PRESENT ILLNESS: 1 or more Elements     History From: EMS, paperwork, EMR review, grandson  Limitations to history : Severe dementia    Nica Ram is a 79 y.o. female with history of advanced dementia, baseline mentation, who presents from [facility] for evaluation of hypokalemia discovered on routine lab testing.  Patient receives lab testing every 6 months routinely.  Lab work was performed yesterday, and she was found to have a potassium of 3.1.  She therefore presents for evaluation of this.  According to EMS, who spoke with the facility, she has otherwise been asymptomatic.  She is at baseline mentation, with no other obvious complaints.  There was no other mention of abnormal labs, and patient was not initially sent with her lab findings.  [facility] was called multiple times, but we have been unable to get in touch with them.  Patient is minimally verbal and clearly suffering from dementia.  She is not able to provide any additional history.    Nursing Notes were all reviewed and agreed with or any disagreements were addressed in the HPI.    REVIEW OF SYSTEMS :      Review of Systems   Unable to perform ROS: Dementia       Positives and Pertinent negatives as per HPI.     SURGICAL HISTORY     Past Surgical History:   Procedure Laterality Date    PTCA      UPPER GASTROINTESTINAL ENDOSCOPY  11/18/14    Dr. Eben Diaz    UPPER GASTROINTESTINAL ENDOSCOPY N/A

## 2025-07-24 NOTE — CONSULTS
PALLIATIVE MEDICINE CONSULTATION     Patient name:Nica Ram   MRN:[id number]    :1945  Room/Bed:[facility]2915/2915-01   LOS: 1 day         Date of consult:2025    Inpatient consult to Palliative Care  Consult performed by: Kandi Michelle APRN - CNP  Consult ordered by: Gilberto Goetz MD  Reason for consult: GOC              ASSESSMENT/RECOMMENDATIONS     79 y.o. female with FTT with DENAE and advanced Alzheimer       Symptom Management:  FTT- pt with 20 lb weight loss in 6 months with muscle wasting and poor appetite   DENAE- Na 170 on admission with improvement to 154 today with Cr of 6.0 on admission down to 4.2 today with bradycardia in 40s and low BP 77/38.   Goals of Care- Pt oriented to self with non-sensical speech. Attempted to call Elliott received VM and then reached out to Loi. When asked if there is HCPOA paperwork he states that there is not but that he is NOK. He states that he's a  and is in court right now and will need to call back in a couple hours. Request was made to consider change in code status educated that pt is a poor candidate for aggressive life saving measures. He will talk to family and call back tonight to change code status. Due to limited conversation and short time        [place] Hierarchy of Surrogate Decision Makers (In ABSENCE of an appointed healthcare agent in Advance Directive / Healthcare Power of )   1. Legal, Court-Appointed Guardian for patient   2. Spouse of patient (except where there is pending divorce, dissolution, legal separation, or annulment proceeding has been filed)   3. Adult child of the patient (if more than one, majority of the reasonably available children)   4. Parent of the patient   5. Adult sibling of the patient (if more than one, majority of the reasonably available siblings)   6. The nearest relative of the patient by blood relationship or adoption who is reasonably available   [place] Code 2133.08     Pts  treatment.  Therapy requires intensive monitoring for toxicity        The patient and/or authorized decision maker consented to a voluntary Advance Care Planning conversation.   Decisional Capacity: Limited   Individuals present for the conversation:  Grandson   Other persons present:  None    Legal Healthcare Decision Maker(s):      ACP documents available in EMR prior to discussion:  [] Advance Medical Directive  [] Portable DNR  [] POLST  [] [place] MOST   [] None  [] ACP documents have been previously completed by patient or surrogate, but are not available today for review.      Goals of Care Determination: Talked to Loi and left  for sister Brenna family to discuss and call tonight with decisions     Resuscitation Status:   Code Status: Full Code    Outcomes / Completed Documentation:  An explanation of advance directives and their importance was provided and the following forms completed:    [] Advance Medical Directive  [] Portable DNR   [] POLST  [x] No new documents completed  [] Patient or surrogate was asked to provide previously completed documents to the palliative team    If new document completed, original was provided to patient and/or family member.    Copy was placed for scanning into the [facility] EMR.      I spent 20 minutes providing separately identifiable ACP services with the patient and/or surrogate decision maker in a voluntary, in-person conversation discussing the patient's wishes and goals as detailed in the above note.                    Signed By: Electronically signed by DAVE Bowen CNP on 7/24/2025 at 2:41 PM  Palliative Medicine     July 24, 2025

## 2025-07-25 LAB
ANION GAP SERPL CALCULATED.3IONS-SCNC: 10 MMOL/L (ref 3–16)
ANION GAP SERPL CALCULATED.3IONS-SCNC: 11 MMOL/L (ref 3–16)
ANION GAP SERPL CALCULATED.3IONS-SCNC: 12 MMOL/L (ref 3–16)
ANION GAP SERPL CALCULATED.3IONS-SCNC: 15 MMOL/L (ref 3–16)
ANION GAP SERPL CALCULATED.3IONS-SCNC: 16 MMOL/L (ref 3–16)
ANION GAP SERPL CALCULATED.3IONS-SCNC: 9 MMOL/L (ref 3–16)
BASE EXCESS BLDV CALC-SCNC: 6.4 MMOL/L (ref -3–3)
BUN SERPL-MCNC: 115 MG/DL (ref 7–20)
BUN SERPL-MCNC: 116 MG/DL (ref 7–20)
BUN SERPL-MCNC: 123 MG/DL (ref 7–20)
BUN SERPL-MCNC: 128 MG/DL (ref 7–20)
BUN SERPL-MCNC: 137 MG/DL (ref 7–20)
BUN SERPL-MCNC: 143 MG/DL (ref 7–20)
CALCIUM SERPL-MCNC: 7.1 MG/DL (ref 8.3–10.6)
CALCIUM SERPL-MCNC: 7.4 MG/DL (ref 8.3–10.6)
CALCIUM SERPL-MCNC: 7.4 MG/DL (ref 8.3–10.6)
CALCIUM SERPL-MCNC: 7.5 MG/DL (ref 8.3–10.6)
CALCIUM SERPL-MCNC: 7.7 MG/DL (ref 8.3–10.6)
CALCIUM SERPL-MCNC: 7.9 MG/DL (ref 8.3–10.6)
CHLORIDE SERPL-SCNC: 111 MMOL/L (ref 99–110)
CHLORIDE SERPL-SCNC: 112 MMOL/L (ref 99–110)
CHLORIDE SERPL-SCNC: 113 MMOL/L (ref 99–110)
CHLORIDE SERPL-SCNC: 113 MMOL/L (ref 99–110)
CHLORIDE SERPL-SCNC: 116 MMOL/L (ref 99–110)
CHLORIDE SERPL-SCNC: 118 MMOL/L (ref 99–110)
CO2 BLDV-SCNC: 70 MMOL/L
CO2 SERPL-SCNC: 20 MMOL/L (ref 21–32)
CO2 SERPL-SCNC: 22 MMOL/L (ref 21–32)
CO2 SERPL-SCNC: 25 MMOL/L (ref 21–32)
CO2 SERPL-SCNC: 26 MMOL/L (ref 21–32)
CO2 SERPL-SCNC: 26 MMOL/L (ref 21–32)
CO2 SERPL-SCNC: 27 MMOL/L (ref 21–32)
COHGB MFR BLDV: 2.5 % (ref 0–1.5)
CREAT SERPL-MCNC: 4.2 MG/DL (ref 0.6–1.2)
CREAT SERPL-MCNC: 4.3 MG/DL (ref 0.6–1.2)
CREAT SERPL-MCNC: 4.6 MG/DL (ref 0.6–1.2)
CREAT SERPL-MCNC: 4.7 MG/DL (ref 0.6–1.2)
EKG ATRIAL RATE: 51 BPM
EKG DIAGNOSIS: NORMAL
EKG P AXIS: 76 DEGREES
EKG P-R INTERVAL: 142 MS
EKG Q-T INTERVAL: 450 MS
EKG QRS DURATION: 96 MS
EKG QTC CALCULATION (BAZETT): 414 MS
EKG R AXIS: 6 DEGREES
EKG T AXIS: 250 DEGREES
EKG VENTRICULAR RATE: 51 BPM
GFR SERPLBLD CREATININE-BSD FMLA CKD-EPI: 10 ML/MIN/{1.73_M2}
GFR SERPLBLD CREATININE-BSD FMLA CKD-EPI: 9 ML/MIN/{1.73_M2}
GFR SERPLBLD CREATININE-BSD FMLA CKD-EPI: 9 ML/MIN/{1.73_M2}
GLUCOSE SERPL-MCNC: 138 MG/DL (ref 70–99)
GLUCOSE SERPL-MCNC: 146 MG/DL (ref 70–99)
GLUCOSE SERPL-MCNC: 149 MG/DL (ref 70–99)
GLUCOSE SERPL-MCNC: 80 MG/DL (ref 70–99)
GLUCOSE SERPL-MCNC: 89 MG/DL (ref 70–99)
GLUCOSE SERPL-MCNC: 90 MG/DL (ref 70–99)
HCO3 BLDV-SCNC: 30 MMOL/L (ref 23–29)
METHGB MFR BLDV: 0.3 %
O2 CT VFR BLDV CALC: 13 VOL %
O2 THERAPY: ABNORMAL
PCO2 BLDV: 38.5 MMHG (ref 40–50)
PH BLDV: 7.5 [PH] (ref 7.35–7.45)
PO2 BLDV: 191 MMHG (ref 25–40)
POTASSIUM SERPL-SCNC: 3.2 MMOL/L (ref 3.5–5.1)
POTASSIUM SERPL-SCNC: 3.4 MMOL/L (ref 3.5–5.1)
POTASSIUM SERPL-SCNC: 3.8 MMOL/L (ref 3.5–5.1)
POTASSIUM SERPL-SCNC: 4.1 MMOL/L (ref 3.5–5.1)
SAO2 % BLDV: 100 %
SODIUM SERPL-SCNC: 147 MMOL/L (ref 136–145)
SODIUM SERPL-SCNC: 147 MMOL/L (ref 136–145)
SODIUM SERPL-SCNC: 150 MMOL/L (ref 136–145)
SODIUM SERPL-SCNC: 151 MMOL/L (ref 136–145)
SODIUM SERPL-SCNC: 153 MMOL/L (ref 136–145)
SODIUM SERPL-SCNC: 154 MMOL/L (ref 136–145)

## 2025-07-25 PROCEDURE — 6360000002 HC RX W HCPCS: Performed by: PHYSICIAN ASSISTANT

## 2025-07-25 PROCEDURE — 2500000003 HC RX 250 WO HCPCS: Performed by: INTERNAL MEDICINE

## 2025-07-25 PROCEDURE — 2580000003 HC RX 258: Performed by: INTERNAL MEDICINE

## 2025-07-25 PROCEDURE — 82803 BLOOD GASES ANY COMBINATION: CPT

## 2025-07-25 PROCEDURE — 6360000002 HC RX W HCPCS

## 2025-07-25 PROCEDURE — 2500000003 HC RX 250 WO HCPCS: Performed by: HOSPITALIST

## 2025-07-25 PROCEDURE — 80048 BASIC METABOLIC PNL TOTAL CA: CPT

## 2025-07-25 PROCEDURE — 2000000000 HC ICU R&B

## 2025-07-25 PROCEDURE — 6360000002 HC RX W HCPCS: Performed by: INTERNAL MEDICINE

## 2025-07-25 PROCEDURE — 6360000002 HC RX W HCPCS: Performed by: HOSPITALIST

## 2025-07-25 PROCEDURE — 6370000000 HC RX 637 (ALT 250 FOR IP): Performed by: HOSPITALIST

## 2025-07-25 PROCEDURE — 93010 ELECTROCARDIOGRAM REPORT: CPT | Performed by: INTERNAL MEDICINE

## 2025-07-25 PROCEDURE — 6370000000 HC RX 637 (ALT 250 FOR IP): Performed by: INTERNAL MEDICINE

## 2025-07-25 PROCEDURE — 6370000000 HC RX 637 (ALT 250 FOR IP): Performed by: NURSE PRACTITIONER

## 2025-07-25 RX ORDER — POTASSIUM CHLORIDE 7.45 MG/ML
INJECTION INTRAVENOUS
Status: COMPLETED
Start: 2025-07-25 | End: 2025-07-25

## 2025-07-25 RX ORDER — SODIUM CHLORIDE 450 MG/100ML
INJECTION, SOLUTION INTRAVENOUS CONTINUOUS
Status: ACTIVE | OUTPATIENT
Start: 2025-07-25 | End: 2025-07-27

## 2025-07-25 RX ORDER — POTASSIUM CHLORIDE 7.45 MG/ML
10 INJECTION INTRAVENOUS
Status: COMPLETED | OUTPATIENT
Start: 2025-07-25 | End: 2025-07-25

## 2025-07-25 RX ORDER — LOPERAMIDE HYDROCHLORIDE 2 MG/1
4 CAPSULE ORAL
Status: COMPLETED | OUTPATIENT
Start: 2025-07-25 | End: 2025-07-25

## 2025-07-25 RX ORDER — MIDODRINE HYDROCHLORIDE 5 MG/1
10 TABLET ORAL
Status: DISCONTINUED | OUTPATIENT
Start: 2025-07-25 | End: 2025-07-31 | Stop reason: HOSPADM

## 2025-07-25 RX ADMIN — Medication 1 PACKET: at 09:29

## 2025-07-25 RX ADMIN — MIDODRINE HYDROCHLORIDE 5 MG: 5 TABLET ORAL at 12:21

## 2025-07-25 RX ADMIN — LOPERAMIDE HYDROCHLORIDE 4 MG: 2 CAPSULE ORAL at 03:09

## 2025-07-25 RX ADMIN — CLOPIDOGREL BISULFATE 75 MG: 75 TABLET, FILM COATED ORAL at 09:34

## 2025-07-25 RX ADMIN — ACETAMINOPHEN 650 MG: 325 TABLET ORAL at 09:34

## 2025-07-25 RX ADMIN — BUSPIRONE HYDROCHLORIDE 15 MG: 5 TABLET ORAL at 09:23

## 2025-07-25 RX ADMIN — POTASSIUM CHLORIDE 10 MEQ: 7.46 INJECTION, SOLUTION INTRAVENOUS at 13:36

## 2025-07-25 RX ADMIN — MIDODRINE HYDROCHLORIDE 10 MG: 5 TABLET ORAL at 17:32

## 2025-07-25 RX ADMIN — POTASSIUM BICARBONATE 25 MEQ: 978 TABLET, EFFERVESCENT ORAL at 02:30

## 2025-07-25 RX ADMIN — SODIUM CHLORIDE: 0.45 INJECTION, SOLUTION INTRAVENOUS at 11:20

## 2025-07-25 RX ADMIN — POTASSIUM CHLORIDE 10 MEQ: 7.46 INJECTION, SOLUTION INTRAVENOUS at 12:16

## 2025-07-25 RX ADMIN — HEPARIN SODIUM 5000 UNITS: 5000 INJECTION INTRAVENOUS; SUBCUTANEOUS at 09:35

## 2025-07-25 RX ADMIN — POTASSIUM CHLORIDE 10 MEQ: 7.46 INJECTION, SOLUTION INTRAVENOUS at 15:02

## 2025-07-25 RX ADMIN — SODIUM CHLORIDE, PRESERVATIVE FREE 10 ML: 5 INJECTION INTRAVENOUS at 09:37

## 2025-07-25 RX ADMIN — AMMONIUM LACTATE: 120 CREAM TOPICAL at 21:25

## 2025-07-25 RX ADMIN — SODIUM CHLORIDE: 0.45 INJECTION, SOLUTION INTRAVENOUS at 22:11

## 2025-07-25 RX ADMIN — AMMONIUM LACTATE: 120 CREAM TOPICAL at 09:35

## 2025-07-25 RX ADMIN — LEVOFLOXACIN 250 MG: 5 INJECTION, SOLUTION INTRAVENOUS at 21:09

## 2025-07-25 RX ADMIN — Medication: at 09:36

## 2025-07-25 RX ADMIN — SODIUM BICARBONATE: 84 INJECTION, SOLUTION INTRAVENOUS at 01:15

## 2025-07-25 RX ADMIN — Medication: at 21:27

## 2025-07-25 RX ADMIN — MIDODRINE HYDROCHLORIDE 5 MG: 5 TABLET ORAL at 09:23

## 2025-07-25 NOTE — CARE COORDINATION
Case Management Assessment  Initial Evaluation    Date/Time of Evaluation: 7/25/2025 4:07 PM  Assessment Completed by: Marisela Up RN    If patient is discharged prior to next notation, then this note serves as note for discharge by case management.    Patient Name: Nica Ram                   YOB: 1945  Diagnosis: Hypernatremia [E87.0]  DENAE (acute kidney injury) [N17.9]  Hypotension, unspecified hypotension type [I95.9]  Acute renal failure, unspecified acute renal failure type [N17.9]                   Date / Time: 7/23/2025  3:06 PM ROOM: [place] [id number]    Patient Admission Status: Inpatient   Readmission Risk (Low < 19, Mod (19-27), High > 27): Readmission Risk Score: 17.4    Current PCP: Damon Patel MD  PCP verified by CM? (P) Yes    Chart Reviewed: Yes      History Provided by: (P) Medical Record  Patient Orientation: (P) Person    Patient Cognition: (P) Dementia / Early Alzheimer's    Hospitalization in the last 30 days (Readmission):  No    If yes, Readmission Assessment in  Navigator will be completed.    Advance Directives:      Code Status: Full Code   Patient's Primary Decision Maker is: (P) Legal Next of Kin      Discharge Planning:    Patient expects to discharge to: (P) Long-term care  Plan for transportation at discharge: (P) Other (see comment)    Financial    Payor: [facility] [facility] MEDICAID / Plan: [facility] MEDICA / Product Type: *No Product type* /         Current Nursing Home Information:  Patient admitted from:  Discharging to Facility/ Agency   Name: [facility]  Address:  [address] [place] [address]   Phone:  [phone]  Fax:  [phone]     Call to [facility] who confirmed the patient is: Long Term Care, no Precert required for return.      Patient Covid vaccination status:    Internal Administration   First Dose COVID-19, PFIZER PURPLE top, DILUTE for use, (age 12 y+), 30mcg/0.3mL  02/15/2021   Second Dose 
Discharge Planning Note:     Writer SUKHDEEP w/Liaison with [facility] [phone] regarding LOC, awaiting return call.    Electronically signed by Marisela Up RN on 7/25/2025 at 9:22 AM     
independent

## 2025-07-25 NOTE — PLAN OF CARE
Problem: Safety - Adult  Goal: Free from fall injury  Outcome: Progressing  Flowsheets (Taken 7/25/2025 1342)  Free From Fall Injury: Instruct family/caregiver on patient safety     Problem: Pain  Goal: Verbalizes/displays adequate comfort level or baseline comfort level  Outcome: Progressing  Flowsheets (Taken 7/25/2025 1342)  Verbalizes/displays adequate comfort level or baseline comfort level:   Encourage patient to monitor pain and request assistance   Assess pain using appropriate pain scale     Problem: Cardiovascular - Adult  Goal: Maintains optimal cardiac output and hemodynamic stability  Outcome: Progressing  Flowsheets (Taken 7/25/2025 1342)  Maintains optimal cardiac output and hemodynamic stability:   Monitor blood pressure and heart rate   Monitor urine output and notify Licensed Independent Practitioner for values outside of normal range  Note: - increase in midodrine      Problem: Metabolic/Fluid and Electrolytes - Adult  Goal: Electrolytes maintained within normal limits  Outcome: Progressing  Flowsheets (Taken 7/25/2025 1342)  Electrolytes maintained within normal limits:   Monitor labs and assess patient for signs and symptoms of electrolyte imbalances   Monitor response to electrolyte replacements, including repeat lab results as appropriate   Administer electrolyte replacement as ordered  Note: - Na improved  - K replaced  Renal labs improves      Problem: Metabolic/Fluid and Electrolytes - Adult  Goal: Hemodynamic stability and optimal renal function maintained  Outcome: Not Progressing  Flowsheets (Taken 7/25/2025 1342)  Hemodynamic stability and optimal renal function maintained:   Monitor labs and assess for signs and symptoms of volume excess or deficit   Monitor intake, output and patient weight

## 2025-07-25 NOTE — PROGRESS NOTES
Patient oriented to self only.    Declining many of the oral medications and eye drops today.  Some medications only 50% of dose taken, see MAR.

## 2025-07-25 NOTE — PROGRESS NOTES
[facility] Note    Patient Active Problem List   Diagnosis    Anxiety state    Essential hypertension, benign    Hearing loss    Vitamin D deficiency    Severe Osteopenia    Abdominal aortic aneurysm    Hyperlipidemia    Old myocardial infarction    Peptic ulcer    Postsurgical percutaneous transluminal coronary angioplasty status    Cataracts, bilateral    Vascular disease    DENAE (acute kidney injury)    Hypokalemia    Hypernatremia    Alzheimer's disease (HCC)    Bradycardia       Past Medical History:   has a past medical history of Acute myocardial infarction, unspecified site, episode of care unspecified, Alzheimer's disease (HCC), Anxiety state, unspecified, Coronary atherosclerosis of unspecified type of vessel, native or graft, Essential hypertension, benign, Gastritis, Osteopenia, Other and unspecified hyperlipidemia, Screening mammogram, Severe Osteopenia, and Unspecified hearing loss.    Past Social History:   reports that she quit smoking about 28 years ago. Her smoking use included cigarettes. She started smoking about 36 years ago. She has a 8 pack-year smoking history. She has never used smokeless tobacco. She reports that she does not currently use alcohol. She reports that she does not use drugs.    Subjective:    Sleepy today  Review of Systems  Not able to fully cooperate  Objective:    24H urine output 700ml  BP (!) 79/58   Pulse 53   Temp 97 °F (36.1 °C) (Temporal)   Resp 17   Wt 42.1 kg (92 lb 13 oz)   SpO2 97%   BMI 16.44 kg/m²     Wt Readings from Last 3 Encounters:   07/25/25 42.1 kg (92 lb 13 oz)   09/20/20 72.6 kg (160 lb)   03/30/20 71.9 kg (158 lb 8.2 oz)       BP Readings from Last 3 Encounters:   07/25/25 (!) 79/58   06/25/21 126/70   09/20/20 (!) 143/53         Chest- clear  Heart-regular  Abd-soft  Ext- no edema    Labs  Hemoglobin   Date Value Ref Range Status   07/24/2025 9.1 (L) 12.0 - 16.0 g/dL Final     Hematocrit   Date Value Ref Range Status

## 2025-07-25 NOTE — PROGRESS NOTES
Patient's grandson called and requested for status update. RN shared patient's current vitals and physical state. Grandson noted he is aware of the request to update code status and informed RN that he is waiting to speak to other family members before making decision. Requested that if patient's status changes before code change, to call him. Will continue to monitor.

## 2025-07-25 NOTE — PROGRESS NOTES
[facility] Shift Summary: [id number]    Shift Events  Patient declined most medications and oral intake today. MD aware  Remains uncooperative with care.  IVF changed.  Na/ creat/ BUN improving  Potassium replaced X1  UOP improved from last shift  2 watery/ loose BM: unable to collect sample due to absorption into depend  Hypotensive.  SBP remains 's.      Family updated: Yes ! Sister and granddaughter called with no answer.  Update with Granddanielle Monsivais.     Rhythm: Sinus Al with PVC's    Most recent vitals:   Visit Vitals  BP (!) 117/59   Pulse (!) 48   Temp 96.8 °F (36 °C) (Temporal)   Resp 12   Wt 42.1 kg (92 lb 13 oz)   SpO2 98%   BMI 16.44 kg/m²         Respiratory support needed (if any):  - RA    Admission weight Weight - Scale: 42.2 kg (93 lb 0.6 oz) (07/24/25 0500)    Today's weight    Wt Readings from Last 1 Encounters:   07/25/25 42.1 kg (92 lb 13 oz)        I/O & Drains  I/O this shift:  In: 1338.4 [P.O.:90; I.V.:1089.2; IV Piggyback:159.2]  Out: 450 [Urine:450]  Resendiz need assessed each shift: YES -  - continue resendiz r/t - accurate I/O unable to use external catheter due to movement and patient compliance   Last documented BM (in last 48 hrs):  Patient Vitals for the past 48 hrs:   Last BM (including prior to admit) Stool Occurrence   07/24/25 0030 07/25/25 1   07/24/25 1300 07/24/25 1   07/24/25 1700 07/24/25 --   07/25/25 0200 07/25/25 1   07/25/25 0400 -- 1   07/25/25 0800 07/25/25 --   07/25/25 1100 07/25/25 1   07/25/25 1600 07/25/25 1                Restraints (in use currently or dc'd in last 12 hrs): No    Order current and documentation up to date? No    Lines/Drains reviewed @ bedside.  Peripheral IV 07/23/25 Distal;Left;Anterior Forearm (Active)   Number of days: 1       Urinary Catheter 07/23/25 2 Way (Active)   Number of days: 1         Drip rates at handoff:    sodium chloride 100 mL/hr at 07/25/25 1825    sodium chloride         Any consults during the shift? No      Bedside Shift

## 2025-07-25 NOTE — PROGRESS NOTES
arteries measuring up to 1.6 cm.   5. Sequela of old granulomatous disease.      RECOMMENDATIONS:   Recommend CTA or MRA, as appropriate, in 6 months and referral to a vascular   specialist.      Reference: Journal of Vascular Surgery 67.1 (2018): 2-77. J Am [place] Radiol   2013;10:789-794.                 Assessment/Plan:    MDM: (High requires combination of any 2 (excluding time))    A. Problems (any 1 for high)  [] Acute/Chronic Illness/injury posing threat to life or bodily function:   [x] Severe exacerbation of chronic illness: PT with severe dementia refuses to eat  ---------------------------------------------------------------------    B. Risk of Treatment (any 1 for high)   [] Drugs/treatments that require intensive monitoring for toxicity    [] Change in code status  [] Decision to escalate care  [] Major surgery/procedure with associated risk factors  [] Prescription drug management  ----------------------------------------------------------------------    C. Data (any 2 for high)  [] Discussed management of the case with   [] With case management at multi-disciplinary rounds    []  who recommended   [] Imaging personally reviewed nd interpreted, includes:    [] EKG or telemetry (rhythm) strip    [] Other:   [x] Data Review (any 3)  [] Collateral history obtained from:    [x] All available consultant notes from yesterday/today were reviewed  [x] All current labs were reviewed and interpreted for clinical significance   [x] Appropriate follow-up labs were ordered  ----------------------------------------------------------------------    D. Time  [] >25 min (low)  [] >35 min (mod)  [] >50 min (high)  [] Critical care time (>30 min)  ----------------------------------------------------------------------      Active Hospital Problems    Diagnosis     Hypokalemia [E87.6]     Hypernatremia [E87.0]     Bradycardia [R00.1]     Alzheimer's disease (HCC) [G30.9, F02.80]     DENAE (acute kidney injury) [N17.9]           Hospital Day: 3    This is a 79 y.o. female who presented to [facility] on 7/23/2025 and is being treated for:    Hypernatremia  - pt is refusing to eat and she is refusing meds  -we are slowly correcting her sodium with  half normal saline.    Hypokalemia  - IV replacment as she refuses PO    Bradycardia  - asymptomatic with this, but no clear cause.      DENAE  -Severe DENAE-probably from prerenal azotemia leadin to ATN in the setting of hypotension plus losartan/HCTZ use. Now non-oliguric. BUN and crea better. Switch to 1/2NSS infusion. No need for acute RRT.         Drugs that require monitoring for toxicity include: saline  and the method of monitoring was/is BMP for NA    DVT ppx: Heparin  GI ppx: PPI  Diet: ADULT DIET; Regular  Code Status: Full Code    PT/OT Eval Status: NO    Disposition:  Continue to monitor closely  Could transfer out of ICU if bed needed,        Gilberto Goetz MD  7/25/2025  2:25 PM

## 2025-07-25 NOTE — PLAN OF CARE
Problem: Metabolic/Fluid and Electrolytes - Adult  Goal: Hemodynamic stability and optimal renal function maintained  7/25/2025 1931 by Shalini Sharma RN  Outcome: Progressing  Problem: Safety - Adult  Goal: Free from fall injury  7/25/2025 1931 by Shalini Sharma RN  Outcome: Progressing  Problem: Pain  Goal: Verbalizes/displays adequate comfort level or baseline comfort level  7/25/2025 1931 by Shalini Sharma RN  Outcome: Progressing  7/25/2025 1342 by Zuly Oliva RN  Outcome: Progressing  Problem: Cardiovascular - Adult  Goal: Maintains optimal cardiac output and hemodynamic stability  7/25/2025 1931 by Shalini Sharma RN  Outcome: Progressing  Problem: Metabolic/Fluid and Electrolytes - Adult  Goal: Hemodynamic stability and optimal renal function maintained  7/25/2025 1931 by Shalini Sharma, RN  Outcome: Progressing

## 2025-07-26 LAB
ANION GAP SERPL CALCULATED.3IONS-SCNC: 12 MMOL/L (ref 3–16)
ANION GAP SERPL CALCULATED.3IONS-SCNC: 13 MMOL/L (ref 3–16)
ANION GAP SERPL CALCULATED.3IONS-SCNC: 9 MMOL/L (ref 3–16)
BUN SERPL-MCNC: 102 MG/DL (ref 7–20)
BUN SERPL-MCNC: 110 MG/DL (ref 7–20)
BUN SERPL-MCNC: 96 MG/DL (ref 7–20)
CALCIUM SERPL-MCNC: 7.2 MG/DL (ref 8.3–10.6)
CALCIUM SERPL-MCNC: 7.3 MG/DL (ref 8.3–10.6)
CALCIUM SERPL-MCNC: 7.4 MG/DL (ref 8.3–10.6)
CHLORIDE SERPL-SCNC: 108 MMOL/L (ref 99–110)
CHLORIDE SERPL-SCNC: 110 MMOL/L (ref 99–110)
CHLORIDE SERPL-SCNC: 112 MMOL/L (ref 99–110)
CO2 SERPL-SCNC: 21 MMOL/L (ref 21–32)
CO2 SERPL-SCNC: 22 MMOL/L (ref 21–32)
CO2 SERPL-SCNC: 26 MMOL/L (ref 21–32)
CREAT SERPL-MCNC: 3.9 MG/DL (ref 0.6–1.2)
CREAT SERPL-MCNC: 3.9 MG/DL (ref 0.6–1.2)
CREAT SERPL-MCNC: 4.2 MG/DL (ref 0.6–1.2)
GFR SERPLBLD CREATININE-BSD FMLA CKD-EPI: 10 ML/MIN/{1.73_M2}
GFR SERPLBLD CREATININE-BSD FMLA CKD-EPI: 11 ML/MIN/{1.73_M2}
GFR SERPLBLD CREATININE-BSD FMLA CKD-EPI: 11 ML/MIN/{1.73_M2}
GLUCOSE SERPL-MCNC: 74 MG/DL (ref 70–99)
GLUCOSE SERPL-MCNC: 78 MG/DL (ref 70–99)
GLUCOSE SERPL-MCNC: 91 MG/DL (ref 70–99)
MAGNESIUM SERPL-MCNC: 1.93 MG/DL (ref 1.8–2.4)
POTASSIUM SERPL-SCNC: 3.6 MMOL/L (ref 3.5–5.1)
POTASSIUM SERPL-SCNC: 3.8 MMOL/L (ref 3.5–5.1)
POTASSIUM SERPL-SCNC: 3.8 MMOL/L (ref 3.5–5.1)
SODIUM SERPL-SCNC: 142 MMOL/L (ref 136–145)
SODIUM SERPL-SCNC: 145 MMOL/L (ref 136–145)
SODIUM SERPL-SCNC: 146 MMOL/L (ref 136–145)

## 2025-07-26 PROCEDURE — 83735 ASSAY OF MAGNESIUM: CPT

## 2025-07-26 PROCEDURE — 2000000000 HC ICU R&B

## 2025-07-26 PROCEDURE — 2580000003 HC RX 258: Performed by: INTERNAL MEDICINE

## 2025-07-26 PROCEDURE — 80048 BASIC METABOLIC PNL TOTAL CA: CPT

## 2025-07-26 PROCEDURE — 6360000002 HC RX W HCPCS: Performed by: HOSPITALIST

## 2025-07-26 PROCEDURE — 2500000003 HC RX 250 WO HCPCS: Performed by: HOSPITALIST

## 2025-07-26 RX ADMIN — HEPARIN SODIUM 5000 UNITS: 5000 INJECTION INTRAVENOUS; SUBCUTANEOUS at 11:42

## 2025-07-26 RX ADMIN — SODIUM CHLORIDE: 0.45 INJECTION, SOLUTION INTRAVENOUS at 20:52

## 2025-07-26 RX ADMIN — Medication: at 08:22

## 2025-07-26 RX ADMIN — POLYVINYL ALCOHOL 1 DROP: 1.4 SOLUTION/ DROPS OPHTHALMIC at 08:20

## 2025-07-26 RX ADMIN — SODIUM CHLORIDE: 0.45 INJECTION, SOLUTION INTRAVENOUS at 08:20

## 2025-07-26 RX ADMIN — Medication: at 20:47

## 2025-07-26 RX ADMIN — AMMONIUM LACTATE: 120 CREAM TOPICAL at 08:21

## 2025-07-26 RX ADMIN — SODIUM CHLORIDE, PRESERVATIVE FREE 10 ML: 5 INJECTION INTRAVENOUS at 20:53

## 2025-07-26 NOTE — PROGRESS NOTES
[facility] Note    Patient Active Problem List   Diagnosis    Anxiety state    Essential hypertension, benign    Hearing loss    Vitamin D deficiency    Severe Osteopenia    Abdominal aortic aneurysm    Hyperlipidemia    Old myocardial infarction    Peptic ulcer    Postsurgical percutaneous transluminal coronary angioplasty status    Cataracts, bilateral    Vascular disease    DENAE (acute kidney injury)    Hypokalemia    Hypernatremia    Alzheimer's disease (HCC)    Bradycardia       Past Medical History:   has a past medical history of Acute myocardial infarction, unspecified site, episode of care unspecified, Alzheimer's disease (HCC), Anxiety state, unspecified, Coronary atherosclerosis of unspecified type of vessel, native or graft, Essential hypertension, benign, Gastritis, Osteopenia, Other and unspecified hyperlipidemia, Screening mammogram, Severe Osteopenia, and Unspecified hearing loss.    Past Social History:   reports that she quit smoking about 28 years ago. Her smoking use included cigarettes. She started smoking about 36 years ago. She has a 8 pack-year smoking history. She has never used smokeless tobacco. She reports that she does not currently use alcohol. She reports that she does not use drugs.    Subjective:    Sleepy today  Review of Systems  Not able to fully cooperate  Objective:    24H urine output 1125ml  +5.1L since admission  BP (!) 104/51   Pulse 59   Temp 97.2 °F (36.2 °C) (Temporal)   Resp 16   Wt 45.5 kg (100 lb 5 oz)   SpO2 98%   BMI 17.77 kg/m²     Wt Readings from Last 3 Encounters:   07/26/25 45.5 kg (100 lb 5 oz)   09/20/20 72.6 kg (160 lb)   03/30/20 71.9 kg (158 lb 8.2 oz)       BP Readings from Last 3 Encounters:   07/26/25 (!) 104/51   06/25/21 126/70   09/20/20 (!) 143/53         Chest- clear  Heart-regular  Abd-soft  Ext- no edema    Labs  Hemoglobin   Date Value Ref Range Status   07/24/2025 9.1 (L) 12.0 - 16.0 g/dL Final     Hematocrit   Date

## 2025-07-26 NOTE — PROGRESS NOTES
Hospitalist Progress Note    Name:  Nica Ram    /Age/Sex: 1945  (79 y.o. female)  MRN & CSN:  [id number] & [id number]    PCP: Damon Patel MD    Date of Admission: 2025    Patient Status:  Inpatient     Chief Complaint:   Chief Complaint   Patient presents with    Abnormal Lab     Sent by [facility] from [facility] for a K 3.1 from routine labs, hx alzheimer, pt at baseline       Hospital Course:   Nica Ram is a 79 y.o. female with history of advanced dementia, baseline mentation, who presents from [facility] for evaluation of hypokalemia discovered on routine lab testing.  Patient receives lab testing every 6 months routinely.  Lab work was performed yesterday, and she was found to have a potassium of 3.1.  She therefore presents for evaluation of this.  According to EMS, who spoke with the facility, she has otherwise been asymptomatic.  She is at baseline mentation, with no other obvious complaints.  There was no other mention of abnormal labs, and patient was not initially sent with her lab findings.  [facility] was called multiple times, but we have been unable to get in touch with them.  Patient is minimally verbal and clearly suffering from dementia.  She is not able to provide any additional history.     Subjective:  Today is:  Hospital Day: 4.  Patient seen and examined in CVU-/2915.     She is refusing meds, does not talk to me        Medications:  Reviewed    Infusion Medications    sodium chloride 75 mL/hr at 25 1142    sodium chloride       Scheduled Medications    midodrine  10 mg Oral TID WC    levofloxacin  250 mg IntraVENous Q48H    acetaminophen  650 mg Oral TID    ammonium lactate   Topical BID    ascorbic acid  500 mg Oral Daily    busPIRone  15 mg Oral BID    clopidogrel  75 mg Oral Daily    polyvinyl alcohol  1 drop Both Eyes BID    ferrous sulfate  325 mg Oral Daily    ketotifen fumarate  1 drop Both Eyes BID    acidophilus  1

## 2025-07-26 NOTE — PROGRESS NOTES
* 145 146*   K 3.8 3.8 3.6   * 110 112*   CO2 27 26 22   * 110* 102*   CREATININE 4.3* 4.2* 3.9*   CALCIUM 7.4* 7.4* 7.2*     Recent Labs     07/23/25  1536   AST 36   ALT 38   BILITOT 0.4   ALKPHOS 141*     No results for input(s): \"INR\" in the last 72 hours.  No results for input(s): \"CKTOTAL\", \"TROPONINI\" in the last 72 hours.    Urinalysis:      Lab Results   Component Value Date/Time    NITRU Negative 07/23/2025 05:18 PM    WBCUA 12 07/23/2025 05:18 PM    BACTERIA 4+ 07/23/2025 05:18 PM    RBCUA 2 07/23/2025 05:18 PM    BLOODU Negative 07/23/2025 05:18 PM    GLUCOSEU Negative 07/23/2025 05:18 PM       Radiology:  CT HEAD WO CONTRAST   Final Result   1.  Likely no acute intracranial findings.      2.  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  These   findings are not well characterized in the absence of contrast.   Hypernatremia may relate to pituitary involvement.      3.  Mass results in significant narrowing of the right optic canal.  MRI is   recommended for further characterization.      4.  Mild interval dilation of the ventricles may relate to age related   cerebral volume loss.  Hydrocephalus is considered less likely but not   entirely excluded.         CT ABDOMEN PELVIS WO CONTRAST Additional Contrast? None   Final Result   1. Unremarkable noncontrast CT appearance of the kidneys.   2. Mildly dilated appendix measures 1.1 cm, likely represents normal   physiologic appearance for this patient.  Correlate clinically for any   concerns for developing appendicitis.   3. Abdominal aortic aneurysm measuring 5.2 cm.  Recommend CTA or MRA, as   appropriate, in 6 months and referral to vascular specialist.   4. Ectatic bilateral iliac arteries measuring up to 1.6 cm.   5. Sequela of old granulomatous disease.  Lila on 7/23/2025 and is being treated for:    Hypernatremia  - pt is refusing to eat and she is refusing meds  -we are slowly correcting her sodium with  half normal saline.  Down to 145    Hypokalemia  - IV replacment as she refuses PO    Bradycardia  - asymptomatic with this, but no clear cause.      DENAE  -Severe DENAE-probably from prerenal azotemia leadin to ATN in the setting of hypotension plus losartan/HCTZ use. Now non-oliguric. BUN and crea better. Switch to 1/2NSS infusion. No need for acute RRT.         Drugs that require monitoring for toxicity include: saline  and the method of monitoring was/is BMP for NA    DVT ppx: Heparin  GI ppx: PPI  Diet: ADULT DIET; Regular  Code Status: Full Code    PT/OT Eval Status: NO    Disposition:  Can transfer out of ICU.  She is hospice appropriate   Refuses food and meds.  [place]  called back and kept her full code.  Not appropriate for PEG tube in my opinion.        Gilberto Goetz MD  7/26/2025  3:14 PM

## 2025-07-26 NOTE — PROGRESS NOTES
Patient resting in bed- attempted to feed patient- patient took a bite of whip cream and continually said no more when this RN attempted to offer more items. Labs drawn patient asked this RN to leave her alone, patient went back to sleep- care ongoing

## 2025-07-27 LAB
ANION GAP SERPL CALCULATED.3IONS-SCNC: 13 MMOL/L (ref 3–16)
BUN SERPL-MCNC: 90 MG/DL (ref 7–20)
CALCIUM SERPL-MCNC: 7.3 MG/DL (ref 8.3–10.6)
CHLORIDE SERPL-SCNC: 109 MMOL/L (ref 99–110)
CO2 SERPL-SCNC: 21 MMOL/L (ref 21–32)
CREAT SERPL-MCNC: 3.7 MG/DL (ref 0.6–1.2)
GFR SERPLBLD CREATININE-BSD FMLA CKD-EPI: 12 ML/MIN/{1.73_M2}
GLUCOSE BLD-MCNC: 76 MG/DL (ref 70–99)
GLUCOSE SERPL-MCNC: 69 MG/DL (ref 70–99)
PERFORMED ON: NORMAL
POTASSIUM SERPL-SCNC: 3.5 MMOL/L (ref 3.5–5.1)
SODIUM SERPL-SCNC: 143 MMOL/L (ref 136–145)

## 2025-07-27 PROCEDURE — 80048 BASIC METABOLIC PNL TOTAL CA: CPT

## 2025-07-27 PROCEDURE — 6360000002 HC RX W HCPCS: Performed by: HOSPITALIST

## 2025-07-27 PROCEDURE — 1200000000 HC SEMI PRIVATE

## 2025-07-27 PROCEDURE — 6360000002 HC RX W HCPCS: Performed by: PHYSICIAN ASSISTANT

## 2025-07-27 PROCEDURE — 2580000003 HC RX 258: Performed by: INTERNAL MEDICINE

## 2025-07-27 RX ORDER — SODIUM CHLORIDE 450 MG/100ML
INJECTION, SOLUTION INTRAVENOUS CONTINUOUS
Status: DISCONTINUED | OUTPATIENT
Start: 2025-07-27 | End: 2025-07-28 | Stop reason: ALTCHOICE

## 2025-07-27 RX ADMIN — SODIUM CHLORIDE: 0.45 INJECTION, SOLUTION INTRAVENOUS at 10:46

## 2025-07-27 RX ADMIN — HEPARIN SODIUM 5000 UNITS: 5000 INJECTION INTRAVENOUS; SUBCUTANEOUS at 09:22

## 2025-07-27 RX ADMIN — Medication: at 09:22

## 2025-07-27 RX ADMIN — AMMONIUM LACTATE: 120 CREAM TOPICAL at 09:22

## 2025-07-27 RX ADMIN — LEVOFLOXACIN 250 MG: 5 INJECTION, SOLUTION INTRAVENOUS at 21:24

## 2025-07-27 RX ADMIN — SODIUM CHLORIDE: 0.45 INJECTION, SOLUTION INTRAVENOUS at 13:40

## 2025-07-27 NOTE — PROGRESS NOTES
Pt continues to refuse care numerous times throughout the shift.  Will continue to attempt to provide care for the patient.

## 2025-07-27 NOTE — PLAN OF CARE
Problem: Safety - Adult  Goal: Free from fall injury  7/26/2025 2005 by Shalini Sharma RN  Outcome: Progressing    Problem: Pain  Goal: Verbalizes/displays adequate comfort level or baseline comfort level  7/26/2025 2005 by Shalini Sharma RN  Outcome: Progressing  Problem: Cardiovascular - Adult  Goal: Maintains optimal cardiac output and hemodynamic stability  7/26/2025 2005 by Shalini Sharma RN  Outcome: Progressing  Problem: Metabolic/Fluid and Electrolytes - Adult  Goal: Electrolytes maintained within normal limits  7/26/2025 2005 by Shalini Sharma RN  Outcome: Progressing  Problem: Metabolic/Fluid and Electrolytes - Adult  Goal: Hemodynamic stability and optimal renal function maintained  7/26/2025 2005 by Shalini Sharma RN  Outcome: Progressing  Problem: Skin/Tissue Integrity  Goal: Skin integrity remains intact  Description: 1.  Monitor for areas of redness and/or skin breakdown  2.  Assess vascular access sites hourly  3.  Every 4-6 hours minimum:  Change oxygen saturation probe site  4.  Every 4-6 hours:  If on nasal continuous positive airway pressure, respiratory therapy assess nares and determine need for appliance change or resting period  Outcome: Progressing

## 2025-07-27 NOTE — PROGRESS NOTES
Called patient granddanielle Monsivais to update- stated he plans to fly here on Tuesday- was explaining peg tube and no appetite when he mentioned he was planning to fly here- care ongoing

## 2025-07-27 NOTE — PLAN OF CARE
Problem: Safety - Adult  Goal: Free from fall injury  Outcome: Progressing     Problem: Pain  Goal: Verbalizes/displays adequate comfort level or baseline comfort level  Outcome: Progressing     Problem: Cardiovascular - Adult  Goal: Maintains optimal cardiac output and hemodynamic stability  Outcome: Progressing     Problem: Metabolic/Fluid and Electrolytes - Adult  Goal: Electrolytes maintained within normal limits  Outcome: Progressing  Goal: Hemodynamic stability and optimal renal function maintained  Outcome: Progressing     Problem: Skin/Tissue Integrity  Goal: Skin integrity remains intact  Description: 1.  Monitor for areas of redness and/or skin breakdown  2.  Assess vascular access sites hourly  3.  Every 4-6 hours minimum:  Change oxygen saturation probe site  4.  Every 4-6 hours:  If on nasal continuous positive airway pressure, respiratory therapy assess nares and determine need for appliance change or resting period  Outcome: Progressing

## 2025-07-27 NOTE — PROGRESS NOTES
[facility] Note    Patient Active Problem List   Diagnosis    Anxiety state    Essential hypertension, benign    Hearing loss    Vitamin D deficiency    Severe Osteopenia    Abdominal aortic aneurysm    Hyperlipidemia    Old myocardial infarction    Peptic ulcer    Postsurgical percutaneous transluminal coronary angioplasty status    Cataracts, bilateral    Vascular disease    DENAE (acute kidney injury)    Hypokalemia    Hypernatremia    Alzheimer's disease (HCC)    Bradycardia       Past Medical History:   has a past medical history of Acute myocardial infarction, unspecified site, episode of care unspecified, Alzheimer's disease (HCC), Anxiety state, unspecified, Coronary atherosclerosis of unspecified type of vessel, native or graft, Essential hypertension, benign, Gastritis, Osteopenia, Other and unspecified hyperlipidemia, Screening mammogram, Severe Osteopenia, and Unspecified hearing loss.    Past Social History:   reports that she quit smoking about 28 years ago. Her smoking use included cigarettes. She started smoking about 36 years ago. She has a 8 pack-year smoking history. She has never used smokeless tobacco. She reports that she does not currently use alcohol. She reports that she does not use drugs.    Subjective:    More awake today    Review of Systems  Not able to fully cooperate    Objective:    24H urine output 675ml  +6.3L since admission  BP (!) 111/58   Pulse 60   Temp (!) 96.7 °F (35.9 °C) (Temporal)   Resp 14   Ht 1.6 m (5' 2.99\")   Wt 45 kg (99 lb 3.3 oz)   SpO2 98%   BMI 17.58 kg/m²     Wt Readings from Last 3 Encounters:   07/27/25 45 kg (99 lb 3.3 oz)   09/20/20 72.6 kg (160 lb)   03/30/20 71.9 kg (158 lb 8.2 oz)       BP Readings from Last 3 Encounters:   07/27/25 (!) 111/58   06/25/21 126/70   09/20/20 (!) 143/53         Chest- clear  Heart-regular  Abd-soft  Ext- no edema    Labs  Hemoglobin   Date Value Ref Range Status   07/24/2025 9.1 (L) 12.0 - 16.0

## 2025-07-27 NOTE — PLAN OF CARE
Problem: Safety - Adult  Goal: Free from fall injury  7/27/2025 1916 by Shalini Sharma RN  Outcome: Progressing  Problem: Pain  Goal: Verbalizes/displays adequate comfort level or baseline comfort level  7/27/2025 1916 by Shalini Sharma RN  Outcome: Progressing  Problem: Cardiovascular - Adult  Goal: Maintains optimal cardiac output and hemodynamic stability  7/27/2025 1916 by Shalini Sharma RN  Outcome: Progressing  Problem: Metabolic/Fluid and Electrolytes - Adult  Goal: Electrolytes maintained within normal limits  7/27/2025 1916 by Shalini Sharma RN  Outcome: Progressing  Problem: Metabolic/Fluid and Electrolytes - Adult  Goal: Hemodynamic stability and optimal renal function maintained  Problem: Skin/Tissue Integrity  Goal: Skin integrity remains intact  Description: 1.  Monitor for areas of redness and/or skin breakdown  2.  Assess vascular access sites hourly  3.  Every 4-6 hours minimum:  Change oxygen saturation probe site  4.  Every 4-6 hours:  If on nasal continuous positive airway pressure, respiratory therapy assess nares and determine need for appliance change or resting period  7/27/2025 1916 by Shalini Sharma, RN  Outcome: Progressing

## 2025-07-27 NOTE — PROGRESS NOTES
Spoke with Loi Ram and provided an update, he would like to speak with the doctor regarding the next steps for the patient.  Explained that I will let the team know tomorrow.

## 2025-07-28 LAB
ANION GAP SERPL CALCULATED.3IONS-SCNC: 19 MMOL/L (ref 3–16)
BASE EXCESS BLDV CALC-SCNC: -3.8 MMOL/L (ref -3–3)
BUN SERPL-MCNC: 74 MG/DL (ref 7–20)
CALCIUM SERPL-MCNC: 7.4 MG/DL (ref 8.3–10.6)
CHLORIDE SERPL-SCNC: 106 MMOL/L (ref 99–110)
CO2 BLDV-SCNC: 42 MMOL/L
CO2 SERPL-SCNC: 16 MMOL/L (ref 21–32)
COHGB MFR BLDV: 8.3 % (ref 0–1.5)
CREAT SERPL-MCNC: 3.2 MG/DL (ref 0.6–1.2)
GFR SERPLBLD CREATININE-BSD FMLA CKD-EPI: 14 ML/MIN/{1.73_M2}
GLUCOSE BLD-MCNC: 104 MG/DL (ref 70–99)
GLUCOSE BLD-MCNC: 135 MG/DL (ref 70–99)
GLUCOSE BLD-MCNC: 53 MG/DL (ref 70–99)
GLUCOSE SERPL-MCNC: 47 MG/DL (ref 70–99)
HCO3 BLDV-SCNC: 18 MMOL/L (ref 23–29)
MAGNESIUM SERPL-MCNC: 1.62 MG/DL (ref 1.8–2.4)
METHGB MFR BLDV: 0.9 %
O2 CT VFR BLDV CALC: 9 VOL %
O2 THERAPY: ABNORMAL
PCO2 BLDV: 20.5 MMHG (ref 40–50)
PERFORMED ON: ABNORMAL
PH BLDV: 7.55 [PH] (ref 7.35–7.45)
PO2 BLDV: 184 MMHG (ref 25–40)
POTASSIUM SERPL-SCNC: 3.3 MMOL/L (ref 3.5–5.1)
SAO2 % BLDV: >100 %
SODIUM SERPL-SCNC: 141 MMOL/L (ref 136–145)

## 2025-07-28 PROCEDURE — 80048 BASIC METABOLIC PNL TOTAL CA: CPT

## 2025-07-28 PROCEDURE — 6360000002 HC RX W HCPCS: Performed by: HOSPITALIST

## 2025-07-28 PROCEDURE — 1200000000 HC SEMI PRIVATE

## 2025-07-28 PROCEDURE — 2580000003 HC RX 258: Performed by: INTERNAL MEDICINE

## 2025-07-28 PROCEDURE — 2500000003 HC RX 250 WO HCPCS: Performed by: HOSPITALIST

## 2025-07-28 PROCEDURE — 2580000003 HC RX 258

## 2025-07-28 PROCEDURE — 82803 BLOOD GASES ANY COMBINATION: CPT

## 2025-07-28 PROCEDURE — 6370000000 HC RX 637 (ALT 250 FOR IP): Performed by: INTERNAL MEDICINE

## 2025-07-28 PROCEDURE — 6360000002 HC RX W HCPCS: Performed by: INTERNAL MEDICINE

## 2025-07-28 PROCEDURE — 83735 ASSAY OF MAGNESIUM: CPT

## 2025-07-28 PROCEDURE — 51702 INSERT TEMP BLADDER CATH: CPT

## 2025-07-28 PROCEDURE — 6370000000 HC RX 637 (ALT 250 FOR IP): Performed by: HOSPITALIST

## 2025-07-28 RX ORDER — POTASSIUM CHLORIDE 7.45 MG/ML
10 INJECTION INTRAVENOUS ONCE
Status: COMPLETED | OUTPATIENT
Start: 2025-07-28 | End: 2025-07-28

## 2025-07-28 RX ORDER — POTASSIUM CHLORIDE 1500 MG/1
40 TABLET, EXTENDED RELEASE ORAL ONCE
Status: DISCONTINUED | OUTPATIENT
Start: 2025-07-28 | End: 2025-07-28

## 2025-07-28 RX ORDER — DEXTROSE MONOHYDRATE 100 MG/ML
INJECTION, SOLUTION INTRAVENOUS CONTINUOUS PRN
Status: DISCONTINUED | OUTPATIENT
Start: 2025-07-28 | End: 2025-07-31 | Stop reason: HOSPADM

## 2025-07-28 RX ORDER — DEXTROSE MONOHYDRATE 100 MG/ML
INJECTION, SOLUTION INTRAVENOUS
Status: COMPLETED
Start: 2025-07-28 | End: 2025-07-28

## 2025-07-28 RX ORDER — MAGNESIUM SULFATE IN WATER 40 MG/ML
2000 INJECTION, SOLUTION INTRAVENOUS ONCE
Status: COMPLETED | OUTPATIENT
Start: 2025-07-28 | End: 2025-07-28

## 2025-07-28 RX ORDER — POTASSIUM CHLORIDE 7.45 MG/ML
10 INJECTION INTRAVENOUS ONCE
Status: CANCELLED | OUTPATIENT
Start: 2025-07-28 | End: 2025-07-28

## 2025-07-28 RX ORDER — GLUCAGON 1 MG/ML
1 KIT INJECTION PRN
Status: DISCONTINUED | OUTPATIENT
Start: 2025-07-28 | End: 2025-07-31 | Stop reason: HOSPADM

## 2025-07-28 RX ORDER — DEXTROSE MONOHYDRATE AND SODIUM CHLORIDE 5; .45 G/100ML; G/100ML
INJECTION, SOLUTION INTRAVENOUS
Status: DISPENSED
Start: 2025-07-28 | End: 2025-07-28

## 2025-07-28 RX ORDER — DEXTROSE MONOHYDRATE AND SODIUM CHLORIDE 5; .45 G/100ML; G/100ML
INJECTION, SOLUTION INTRAVENOUS CONTINUOUS
Status: DISCONTINUED | OUTPATIENT
Start: 2025-07-28 | End: 2025-07-29

## 2025-07-28 RX ADMIN — POTASSIUM CHLORIDE 10 MEQ: 7.46 INJECTION, SOLUTION INTRAVENOUS at 11:24

## 2025-07-28 RX ADMIN — OXYCODONE HYDROCHLORIDE AND ACETAMINOPHEN 500 MG: 500 TABLET ORAL at 08:47

## 2025-07-28 RX ADMIN — MAGNESIUM SULFATE HEPTAHYDRATE 2000 MG: 40 INJECTION, SOLUTION INTRAVENOUS at 15:23

## 2025-07-28 RX ADMIN — POTASSIUM CHLORIDE 10 MEQ: 7.46 INJECTION, SOLUTION INTRAVENOUS at 13:35

## 2025-07-28 RX ADMIN — HEPARIN SODIUM 5000 UNITS: 5000 INJECTION INTRAVENOUS; SUBCUTANEOUS at 08:47

## 2025-07-28 RX ADMIN — SODIUM CHLORIDE, PRESERVATIVE FREE 10 ML: 5 INJECTION INTRAVENOUS at 21:30

## 2025-07-28 RX ADMIN — SODIUM CHLORIDE, PRESERVATIVE FREE 10 ML: 5 INJECTION INTRAVENOUS at 08:48

## 2025-07-28 RX ADMIN — SODIUM CHLORIDE: 0.45 INJECTION, SOLUTION INTRAVENOUS at 02:43

## 2025-07-28 RX ADMIN — DEXTROSE 125 ML: 10 SOLUTION INTRAVENOUS at 06:12

## 2025-07-28 RX ADMIN — DEXTROSE AND SODIUM CHLORIDE: 5; 450 INJECTION, SOLUTION INTRAVENOUS at 21:43

## 2025-07-28 RX ADMIN — DEXTROSE AND SODIUM CHLORIDE: 5; 450 INJECTION, SOLUTION INTRAVENOUS at 08:00

## 2025-07-28 RX ADMIN — POTASSIUM CHLORIDE 10 MEQ: 7.46 INJECTION, SOLUTION INTRAVENOUS at 12:33

## 2025-07-28 ASSESSMENT — PAIN SCALES - WONG BAKER
WONGBAKER_NUMERICALRESPONSE: HURTS A LITTLE BIT
WONGBAKER_NUMERICALRESPONSE: NO HURT

## 2025-07-28 NOTE — PROGRESS NOTES
Received message from Dr. Dent indicating that I should alert Nephrology of patient having abnormal lab values today.  Message sent to Dr. Delgado.

## 2025-07-28 NOTE — PROGRESS NOTES
Let Dr. Dent know that patient had low blood sugar 47 per the lab, and rechecked it was 53.  Hypoglycemic protocol initiated.  D10% given as ordered will re-evaluate.  Pt just keeps repeating \"I don't feel well.\"  Message sent to Dr. Dent in regard to other abnormal lab values.

## 2025-07-28 NOTE — PROGRESS NOTES
Pt  awake, disoriented to place and time.    Verbalising \"I'm sick\", not answering other question.   Refusing nursing care, refused to eat and take her oral meds including plavix.   Dr. Goetz made aware. No orders made.

## 2025-07-28 NOTE — PROGRESS NOTES
Pt transferred to room 5906. When attempting to settle patient into the room pt attempted to hit staff while verbalizing \"get away from me\" and \"don't touch me\". Unable to obtain vitals or complete a physical assessment at this time.     Pt resting in bed in bed at this time. Bed alarm on and call light within pt's reach.

## 2025-07-28 NOTE — PLAN OF CARE
Problem: Safety - Adult  Goal: Free from fall injury  Outcome: Progressing     Problem: Pain  Goal: Verbalizes/displays adequate comfort level or baseline comfort level  Outcome: Progressing     Problem: Cardiovascular - Adult  Goal: Maintains optimal cardiac output and hemodynamic stability  Outcome: Progressing     Problem: Metabolic/Fluid and Electrolytes - Adult  Goal: Electrolytes maintained within normal limits  Outcome: Progressing  Goal: Hemodynamic stability and optimal renal function maintained  Outcome: Progressing     Problem: Skin/Tissue Integrity  Goal: Skin integrity remains intact  Description: 1.  Monitor for areas of redness and/or skin breakdown  2.  Assess vascular access sites hourly  3.  Every 4-6 hours minimum:  Change oxygen saturation probe site  4.  Every 4-6 hours:  If on nasal continuous positive airway pressure, respiratory therapy assess nares and determine need for appliance change or resting period  Outcome: Progressing  Flowsheets (Taken 7/28/2025 0902 by Trevor Emery RN)  Skin Integrity Remains Intact:   Monitor for areas of redness and/or skin breakdown   Assess vascular access sites hourly   Every 4-6 hours minimum:  Change oxygen saturation probe site   Every 4-6 hours:  If on nasal continuous positive airway pressure, assess nares and determine need for appliance change or resting period     Problem: Discharge Planning  Goal: Discharge to home or other facility with appropriate resources  Outcome: Progressing

## 2025-07-28 NOTE — PROGRESS NOTES
Report given to Lonnie of 5c.   Keren Monsivais made aware of the transfer.   Both no further concern.

## 2025-07-28 NOTE — PROGRESS NOTES
[facility] Note    Patient Active Problem List   Diagnosis    Anxiety state    Essential hypertension, benign    Hearing loss    Vitamin D deficiency    Severe Osteopenia    Abdominal aortic aneurysm    Hyperlipidemia    Old myocardial infarction    Peptic ulcer    Postsurgical percutaneous transluminal coronary angioplasty status    Cataracts, bilateral    Vascular disease    DENAE (acute kidney injury)    Hypokalemia    Hypernatremia    Alzheimer's disease (HCC)    Bradycardia       Past Medical History:   has a past medical history of Acute myocardial infarction, unspecified site, episode of care unspecified, Alzheimer's disease (HCC), Anxiety state, unspecified, Coronary atherosclerosis of unspecified type of vessel, native or graft, Essential hypertension, benign, Gastritis, Osteopenia, Other and unspecified hyperlipidemia, Screening mammogram, Severe Osteopenia, and Unspecified hearing loss.    Past Social History:   reports that she quit smoking about 28 years ago. Her smoking use included cigarettes. She started smoking about 36 years ago. She has a 8 pack-year smoking history. She has never used smokeless tobacco. She reports that she does not currently use alcohol. She reports that she does not use drugs.    Subjective:    More awake today  Refusing p.o. medications    Review of Systems  Not able to fully cooperate    Objective:    24H urine output 1575ml  +5.9L since admission  /63   Pulse 60   Temp 97.6 °F (36.4 °C) (Temporal)   Resp 15   Ht 1.6 m (5' 2.99\")   Wt 45.5 kg (100 lb 5 oz)   SpO2 97%   BMI 17.77 kg/m²     Wt Readings from Last 3 Encounters:   07/28/25 45.5 kg (100 lb 5 oz)   09/20/20 72.6 kg (160 lb)   03/30/20 71.9 kg (158 lb 8.2 oz)       BP Readings from Last 3 Encounters:   07/28/25 126/63   06/25/21 126/70   09/20/20 (!) 143/53         Chest- clear  Heart-regular  Abd-soft  Ext- no edema    Labs  Hemoglobin   Date Value Ref Range Status   07/24/2025 9.1

## 2025-07-28 NOTE — PROGRESS NOTES
or MRA, as   appropriate, in 6 months and referral to vascular specialist.   4. Ectatic bilateral iliac arteries measuring up to 1.6 cm.   5. Sequela of old granulomatous disease.      RECOMMENDATIONS:   Recommend CTA or MRA, as appropriate, in 6 months and referral to a vascular   specialist.      Reference: Journal of Vascular Surgery 67.1 (2018): 2-77. J Am [place] Radiol   2013;10:789-794.                 Assessment/Plan:    MDM: (High requires combination of any 2 (excluding time))    A. Problems (any 1 for high)  [] Acute/Chronic Illness/injury posing threat to life or bodily function:   [x] Severe exacerbation of chronic illness: PT with severe advanced  dementia refuses to eat  ---------------------------------------------------------------------    B. Risk of Treatment (any 1 for high)   [] Drugs/treatments that require intensive monitoring for toxicity    [] Change in code status  [] Decision to escalate care  [] Major surgery/procedure with associated risk factors  [] Prescription drug management  ----------------------------------------------------------------------    C. Data (any 2 for high)  [] Discussed management of the case with   [] With case management at multi-disciplinary rounds    []  who recommended   [] Imaging personally reviewed nd interpreted, includes:    [] EKG or telemetry (rhythm) strip    [] Other:   [x] Data Review (any 3)  [] Collateral history obtained from:    [x] All available consultant notes from yesterday/today were reviewed  [x] All current labs were reviewed and interpreted for clinical significance   [x] Appropriate follow-up labs were ordered  ----------------------------------------------------------------------    D. Time  [] >25 min (low)  [] >35 min (mod)  [] >50 min (high)  [] Critical care time (>30 min)  ----------------------------------------------------------------------      Active Hospital Problems    Diagnosis     Hypokalemia [E87.6]     Hypernatremia [E87.0]      Bradycardia [R00.1]     Alzheimer's disease (HCC) [G30.9, F02.80]     DENAE (acute kidney injury) [N17.9]          Hospital Day: 6    This is a 79 y.o. female who presented to [facility] on 7/23/2025 and is being treated for:    Hypernatremia  - pt is refusing to eat and she is refusing meds  -we are slowly correcting her sodium with  half normal saline.  - her sodium is now corrected.    Hypokalemia  - IV replacment as she refuses PO  - low again today, so getting replacement     Bradycardia  - asymptomatic with this, but no clear cause.      DENAE  -Severe DENAE-probably from prerenal azotemia leadin to ATN in the setting of hypotension plus losartan/HCTZ use. Now non-oliguric. BUN and crea better. Switch to 1/2NSS infusion. No need for acute RRT.   Cr is down to 3.2         Drugs that require monitoring for toxicity include: saline  and the method of monitoring was/is BMP for NA    DVT ppx: Heparin  GI ppx: PPI  Diet: ADULT DIET; Regular  Code Status: Full Code    PT/OT Eval Status: NO    Disposition:  Can transfer out of ICU.  She is hospice appropriate   Refuses food and meds.  [place]  called back and kept her full code, he plans to be at bedside tomorroe.  Not appropriate for PEG tube in my opinion.  Will try and call POA tomorrow with palliative care.        Gilberto Goetz MD  7/28/2025  2:48 PM

## 2025-07-28 NOTE — PROGRESS NOTES
PALLIATIVE MEDICINE PROGRESS NOTE     Patient name:Nica Ram    MRN:[id number] :1945  Room/Bed:U-2915/2915-01    LOS: 5 days        ASSESSMENT/RECOMMENDATIONS   79 y.o. female with FTT with DENAE and advanced Alzheimer         Symptom Management:  FTT- pt with 20 lb weight loss in 6 months with muscle wasting and poor appetite   DENAE- Na 170 on admission with improvement to 141 today with Cr of 6.0 on admission down to 3.2 today.   Goals of Care- Pt oriented to self with non-sensical speech. Attempted to call Elliott received VM and then reached out to Brenna pts sister who states that we should call Loi. I called facility and requested any HCPOA paperwork. Talked to Loi he again liang in a rush to get off the phone due to busy schedule he is planned to fly to [place] tomorrow and will try to be at bedside by 4pm. He wants to defer all decision making until he is here in person. We discussed pts refusal for care and medications and again encouraged him to consider limited code. He states that he will be here tomorrow and defers all decision making until then. He also reports that he is getting positive reports from night shift nurses so feels like he's getting mixed messages. He states that last night he was told that pt may be back at facility when he arrives tomorrow.  Reviewed with Loi that while patient's lab work and vital signs are looking improved that the patient is following along a declining trajectory and that dehydration and malnutrition is probably what caused this admission and those same events will occur again at discharge due to patient's disease process.  Also discussed that PEG tube is not indicated for advanced Alzheimer's since showed no survivability benefit.       Addendum 1400- talked to pts sister Brenna at bedside who by [place] law is NOK. Also talked to SW at pts facility since this note and she shared that granddaughter Elliott had started proceedings for legal

## 2025-07-28 NOTE — PROGRESS NOTES
Received new order for D5 and .45%NS at 70ml/hr for patient from Dr. Delgado.  Order placed, reviewed information with oncoming nurse.

## 2025-07-28 NOTE — PROGRESS NOTES
Hospitalist Progress Note    Name:  Nica Ram    /Age/Sex: 1945  (79 y.o. female)  MRN & CSN:  [id number] & [id number]    PCP: Damon Patel MD    Date of Admission: 2025    Patient Status:  Inpatient     Chief Complaint:   Chief Complaint   Patient presents with    Abnormal Lab     Sent by [facility]usaige from [facility] for a K 3.1 from routine labs, hx alzheimer, pt at baseline       Hospital Course:   Nica Ram is a 79 y.o. female with history of advanced dementia, baseline mentation, who presents from [facility] for evaluation of hypokalemia discovered on routine lab testing.  Patient receives lab testing every 6 months routinely.  Lab work was performed yesterday, and she was found to have a potassium of 3.1.  She therefore presents for evaluation of this.  According to EMS, who spoke with the facility, she has otherwise been asymptomatic.  She is at baseline mentation, with no other obvious complaints.  There was no other mention of abnormal labs, and patient was not initially sent with her lab findings.  [facility] was called multiple times, but we have been unable to get in touch with them.  Patient is minimally verbal and clearly suffering from dementia.  She is not able to provide any additional history.     Subjective:  Today is:  Hospital Day: 5.  Patient seen and examined in CVU-/2915.     She is refusing meds, does not talk to me        Medications:  Reviewed    Infusion Medications    sodium chloride 75 mL/hr at 25 1340    sodium chloride       Scheduled Medications    midodrine  10 mg Oral TID WC    levofloxacin  250 mg IntraVENous Q48H    acetaminophen  650 mg Oral TID    ammonium lactate   Topical BID    ascorbic acid  500 mg Oral Daily    busPIRone  15 mg Oral BID    clopidogrel  75 mg Oral Daily    polyvinyl alcohol  1 drop Both Eyes BID    ferrous sulfate  325 mg Oral Daily    ketotifen fumarate  1 drop Both Eyes BID    acidophilus  1

## 2025-07-29 LAB
ANION GAP SERPL CALCULATED.3IONS-SCNC: 10 MMOL/L (ref 3–16)
BUN SERPL-MCNC: 54 MG/DL (ref 7–20)
CALCIUM SERPL-MCNC: 7.4 MG/DL (ref 8.3–10.6)
CHLORIDE SERPL-SCNC: 109 MMOL/L (ref 99–110)
CO2 SERPL-SCNC: 20 MMOL/L (ref 21–32)
CREAT SERPL-MCNC: 2.6 MG/DL (ref 0.6–1.2)
GFR SERPLBLD CREATININE-BSD FMLA CKD-EPI: 18 ML/MIN/{1.73_M2}
GLUCOSE SERPL-MCNC: 111 MG/DL (ref 70–99)
POTASSIUM SERPL-SCNC: 3.3 MMOL/L (ref 3.5–5.1)
SODIUM SERPL-SCNC: 139 MMOL/L (ref 136–145)

## 2025-07-29 PROCEDURE — 6360000002 HC RX W HCPCS: Performed by: INTERNAL MEDICINE

## 2025-07-29 PROCEDURE — 1200000000 HC SEMI PRIVATE

## 2025-07-29 PROCEDURE — 6360000002 HC RX W HCPCS: Performed by: HOSPITALIST

## 2025-07-29 PROCEDURE — 6370000000 HC RX 637 (ALT 250 FOR IP): Performed by: HOSPITALIST

## 2025-07-29 PROCEDURE — 2580000003 HC RX 258: Performed by: INTERNAL MEDICINE

## 2025-07-29 PROCEDURE — 36415 COLL VENOUS BLD VENIPUNCTURE: CPT

## 2025-07-29 PROCEDURE — 80048 BASIC METABOLIC PNL TOTAL CA: CPT

## 2025-07-29 PROCEDURE — 2500000003 HC RX 250 WO HCPCS: Performed by: HOSPITALIST

## 2025-07-29 RX ORDER — SODIUM CHLORIDE, SODIUM LACTATE, POTASSIUM CHLORIDE, CALCIUM CHLORIDE 600; 310; 30; 20 MG/100ML; MG/100ML; MG/100ML; MG/100ML
INJECTION, SOLUTION INTRAVENOUS CONTINUOUS
Status: DISCONTINUED | OUTPATIENT
Start: 2025-07-29 | End: 2025-07-31 | Stop reason: HOSPADM

## 2025-07-29 RX ORDER — POTASSIUM CHLORIDE 7.45 MG/ML
10 INJECTION INTRAVENOUS
Status: DISPENSED | OUTPATIENT
Start: 2025-07-29 | End: 2025-07-29

## 2025-07-29 RX ADMIN — SODIUM CHLORIDE, PRESERVATIVE FREE 10 ML: 5 INJECTION INTRAVENOUS at 22:20

## 2025-07-29 RX ADMIN — POTASSIUM CHLORIDE 10 MEQ: 7.46 INJECTION, SOLUTION INTRAVENOUS at 17:13

## 2025-07-29 RX ADMIN — BUSPIRONE HYDROCHLORIDE 15 MG: 5 TABLET ORAL at 22:20

## 2025-07-29 RX ADMIN — SODIUM CHLORIDE, SODIUM LACTATE, POTASSIUM CHLORIDE, AND CALCIUM CHLORIDE: .6; .31; .03; .02 INJECTION, SOLUTION INTRAVENOUS at 17:04

## 2025-07-29 RX ADMIN — HEPARIN SODIUM 5000 UNITS: 5000 INJECTION INTRAVENOUS; SUBCUTANEOUS at 22:20

## 2025-07-29 ASSESSMENT — PAIN SCALES - WONG BAKER: WONGBAKER_NUMERICALRESPONSE: HURTS A LITTLE BIT

## 2025-07-29 NOTE — PROGRESS NOTES
Pt stating she \" is scared to die\" and \" does not want to be touched. Breakfast tray offered and pt pushed tray away, turned head. She stated she does not want to take any meds. She stated \" she does not want touched\" pt turned on alternate side. Fresh blankets given. Pt repositioned.    Report called and given to Jordan MULLER on [facility].  Pt going to room 8598

## 2025-07-29 NOTE — PLAN OF CARE
Problem: Safety - Adult  Goal: Free from fall injury  Outcome: Not Progressing     Problem: Pain  Goal: Verbalizes/displays adequate comfort level or baseline comfort level  Outcome: Not Progressing     Problem: Cardiovascular - Adult  Goal: Maintains optimal cardiac output and hemodynamic stability  Outcome: Not Progressing     Problem: Metabolic/Fluid and Electrolytes - Adult  Goal: Electrolytes maintained within normal limits  Outcome: Not Progressing  Goal: Hemodynamic stability and optimal renal function maintained  Outcome: Not Progressing     Problem: Skin/Tissue Integrity  Goal: Skin integrity remains intact  Description: 1.  Monitor for areas of redness and/or skin breakdown  2.  Assess vascular access sites hourly  3.  Every 4-6 hours minimum:  Change oxygen saturation probe site  4.  Every 4-6 hours:  If on nasal continuous positive airway pressure, respiratory therapy assess nares and determine need for appliance change or resting period  Outcome: Not Progressing     Problem: Discharge Planning  Goal: Discharge to home or other facility with appropriate resources  Outcome: Not Progressing     Problem: Safety - Adult  Goal: Free from fall injury  Outcome: Not Progressing     Problem: Pain  Goal: Verbalizes/displays adequate comfort level or baseline comfort level  Outcome: Not Progressing     Problem: Cardiovascular - Adult  Goal: Maintains optimal cardiac output and hemodynamic stability  Outcome: Not Progressing     Problem: Metabolic/Fluid and Electrolytes - Adult  Goal: Electrolytes maintained within normal limits  Outcome: Not Progressing  Goal: Hemodynamic stability and optimal renal function maintained  Outcome: Not Progressing     Problem: Skin/Tissue Integrity  Goal: Skin integrity remains intact  Description: 1.  Monitor for areas of redness and/or skin breakdown  2.  Assess vascular access sites hourly  3.  Every 4-6 hours minimum:  Change oxygen saturation probe site  4.  Every 4-6 hours:

## 2025-07-29 NOTE — PROGRESS NOTES
[facility] Note    Patient Active Problem List   Diagnosis    Anxiety state    Essential hypertension, benign    Hearing loss    Vitamin D deficiency    Severe Osteopenia    Abdominal aortic aneurysm    Hyperlipidemia    Old myocardial infarction    Peptic ulcer    Postsurgical percutaneous transluminal coronary angioplasty status    Cataracts, bilateral    Vascular disease    DENAE (acute kidney injury)    Hypokalemia    Hypernatremia    Alzheimer's disease (HCC)    Bradycardia       Past Medical History:   has a past medical history of Acute myocardial infarction, unspecified site, episode of care unspecified, Alzheimer's disease (HCC), Anxiety state, unspecified, Coronary atherosclerosis of unspecified type of vessel, native or graft, Essential hypertension, benign, Gastritis, Osteopenia, Other and unspecified hyperlipidemia, Screening mammogram, Severe Osteopenia, and Unspecified hearing loss.    Past Social History:   reports that she quit smoking about 28 years ago. Her smoking use included cigarettes. She started smoking about 36 years ago. She has a 8 pack-year smoking history. She has never used smokeless tobacco. She reports that she does not currently use alcohol. She reports that she does not use drugs.    Subjective:    More awake today  Refusing p.o. medications    Review of Systems  Not able to fully cooperate    Objective:    24H urine output 1575ml  +5.9L since admission  BP (!) 102/57   Pulse 61   Temp 97 °F (36.1 °C) (Temporal)   Resp 16   Ht 1.6 m (5' 2.99\")   Wt 47.6 kg (105 lb)   SpO2 95%   BMI 18.60 kg/m²     Wt Readings from Last 3 Encounters:   07/29/25 47.6 kg (105 lb)   09/20/20 72.6 kg (160 lb)   03/30/20 71.9 kg (158 lb 8.2 oz)       BP Readings from Last 3 Encounters:   07/29/25 (!) 102/57   06/25/21 126/70   09/20/20 (!) 143/53         Chest- clear  Heart-regular  Abd-soft  Ext- no edema    Labs  Hemoglobin   Date Value Ref Range Status   07/24/2025 9.1 (L)  12.0 - 16.0 g/dL Final     Hematocrit   Date Value Ref Range Status   07/24/2025 27.1 (L) 36.0 - 48.0 % Final     WBC   Date Value Ref Range Status   07/24/2025 4.8 4.0 - 11.0 K/uL Final     Platelets   Date Value Ref Range Status   07/24/2025 59 (L) 135 - 450 K/uL Final     Lab Results   Component Value Date    CREATININE 2.6 (H) 07/29/2025    BUN 54 (H) 07/29/2025     07/29/2025    K 3.3 (L) 07/29/2025     07/29/2025    CO2 20 (L) 07/29/2025     Uosm 476  Andre 21  UK 35    Repeat venous blood gas showing a pH of 7.55 and pCO2 of 21    Assessment/Plan:  1.  Severe DENAE-probably from prerenal azotemia leadin to ATN in the setting of hypotension plus losartan/HCTZ use.  Now non-oliguric.  BUN and crea better.  Switched to 1/2NSS infusion.  No need for acute RRT.   2.  Hypokalemia-replace IV  3.  Hypomagnesemia-follow Mg tomorrow.   4.  Hypotension-IV fluid resuscitation.  Increased Midodrine to 10mg po TID.  Better.   5.  Severe hypernatremia-Na at goal.  Follow sodium daily.  DI unlikely.   Switch to LR.   6.  History of coronary artery disease  7.  History of Alzheimer's dementia  8.  Low serum HCO3-in setting of DENAE.  Now showing respiratory alkalosis.  Serum HCO3 better  9.  Continue inpatient stay.  Dispo-hopefully in 2-3 days      Karthik Delgado MD

## 2025-07-29 NOTE — PROGRESS NOTES
V2.0    [facility] [place] Progress Note      Name:  Nica Ram /Age/Sex: 1945  (79 y.o. female)   MRN & CSN:  [id number] & [id number] Encounter Date/Time: 2025 12:23 PM EDT   Location:  [facility]-5576/5576-01 PCP: Damon Patel MD     Attending:Sarah Pizano MD       Hospital Day: 7    Assessment and Recommendations   Nica Ram is a 79 y.o. female with pmh of advanced Alzheimer's dementia, who presents with DENAE (acute kidney injury)      Plan:   Hyponatremia-resolved  Likely in the setting of acute kidney failure, hypovolemia  Will monitor sodium with routine labs    2.  Acute renal failure  Presented with creatinine 6.0, GFR 7  Patient currently out of the ICU  Labs have improved to creatinine 2.6, GFR 18  IV fluid resuscitation  Avoid hypotension, avoid neurotoxic agent  Follow monitor kidney function  Nephrology on board and following, recommending no need for RRT at this time    3.  Hypotension improved  Most likely in setting of hyponatremia which is resolved, decreased p.o.  Midodrine 10 mg 3 times a day      4.  Alzheimer's disease  Advanced end-stage, most likely at baseline      Will continue to follow and monitor and manage chronic medical conditions with medication listed below      Diet ADULT DIET; Regular   DVT Prophylaxis [] Lovenox, [x]  Heparin, [] SCDs, [] Ambulation,  [] Eliquis, [] Xarelto  [] Coumadin   Code Status Full Code   Disposition From: [facility]  Expected Disposition: [facility]  Estimated Date of Discharge: 1-2 days  Patient requires continued admission due to clinical improvement          Personally reviewed Lab Studies and Imaging         Medical Decision Making:  The following items were considered in medical decision making:  Discussion of patient care with other providers  Reviewed clinical lab tests  Reviewed radiology tests  Reviewed other diagnostic tests/interventions  Independent review of radiologic images  Microbiology cultures and other micro  tests reviewed      Subjective:     Chief Complaint: Abnormal labs    Nica Ram is a 79 y.o. female who presents with DENAE      Review of Systems:      Pertinent positives and negatives discussed in HPI    Objective:     Intake/Output Summary (Last 24 hours) at 7/29/2025 1223  Last data filed at 7/29/2025 0629  Gross per 24 hour   Intake 1843.98 ml   Output 825 ml   Net 1018.98 ml      Vitals:   Vitals:    07/29/25 0315 07/29/25 0400 07/29/25 0430 07/29/25 0800   BP: 110/64 (!) 102/50  (!) 102/57   Pulse: 55 55  54   Resp:  16  18   Temp:  97 °F (36.1 °C)     TempSrc:  Temporal     SpO2: 98% 99%     Weight:   47.6 kg (105 lb)    Height:             Physical Exam:      Physical Exam Performed:    BP (!) 102/57   Pulse 54   Temp 97 °F (36.1 °C) (Temporal)   Resp 18   Ht 1.6 m (5' 2.99\")   Wt 47.6 kg (105 lb)   SpO2 99%   BMI 18.60 kg/m²     General appearance: No apparent distress, cooperative.  HEENT: Pupils equal, round, and reactive to light.   Neck: Supple, with full range of motion.  Trachea midline.  Respiratory:  Normal respiratory effort.   Cardiovascular: Regular rate and rhythm   Abdomen: Soft, non-tender, non-distended   Musculoskeletal: No edema bilaterally.  F  Skin: Skin color, texture, turgor normal.  No rashes or lesions.  Neurologic:  Neurovascularly intact  Psychiatric: Alert not oriented  Capillary Refill: Brisk, 3 seconds, normal   Peripheral Pulses: +2 palpable, equal bilaterally       Medications:   Medications:    midodrine  10 mg Oral TID WC    acetaminophen  650 mg Oral TID    ammonium lactate   Topical BID    ascorbic acid  500 mg Oral Daily    busPIRone  15 mg Oral BID    clopidogrel  75 mg Oral Daily    polyvinyl alcohol  1 drop Both Eyes BID    ferrous sulfate  325 mg Oral Daily    ketotifen fumarate  1 drop Both Eyes BID    acidophilus  1 packet Oral Daily    pantoprazole  40 mg Oral Daily    sennosides-docusate sodium  1 tablet Oral Nightly    vitamin B-1  100 mg Oral Daily

## 2025-07-29 NOTE — PLAN OF CARE
Problem: Safety - Adult  Goal: Free from fall injury  7/28/2025 2223 by Radha Gaona RN  Outcome: Progressing  7/28/2025 1436 by Jessica Burleson RN  Outcome: Progressing     Problem: Pain  Goal: Verbalizes/displays adequate comfort level or baseline comfort level  7/28/2025 2223 by Radha Gaona RN  Outcome: Progressing  7/28/2025 1436 by Jessica Burleson RN  Outcome: Progressing     Problem: Cardiovascular - Adult  Goal: Maintains optimal cardiac output and hemodynamic stability  7/28/2025 2223 by Radha Gaona RN  Outcome: Progressing  7/28/2025 1436 by Jessica Burleson RN  Outcome: Progressing     Problem: Metabolic/Fluid and Electrolytes - Adult  Goal: Electrolytes maintained within normal limits  7/28/2025 2223 by Radha Gaona RN  Outcome: Progressing  Flowsheets (Taken 7/28/2025 2000)  Electrolytes maintained within normal limits: Monitor labs and assess patient for signs and symptoms of electrolyte imbalances  7/28/2025 1436 by Jessica Burleson RN  Outcome: Progressing  Goal: Hemodynamic stability and optimal renal function maintained  7/28/2025 2223 by Radha Gaona RN  Outcome: Progressing  Flowsheets (Taken 7/28/2025 2000)  Hemodynamic stability and optimal renal function maintained: Monitor labs and assess for signs and symptoms of volume excess or deficit  7/28/2025 1436 by Jessica Burleson RN  Outcome: Progressing     Problem: Skin/Tissue Integrity  Goal: Skin integrity remains intact  Description: 1.  Monitor for areas of redness and/or skin breakdown  2.  Assess vascular access sites hourly  3.  Every 4-6 hours minimum:  Change oxygen saturation probe site  4.  Every 4-6 hours:  If on nasal continuous positive airway pressure, respiratory therapy assess nares and determine need for appliance change or resting period  7/28/2025 2223 by Radha Gaona RN  Outcome: Progressing  Flowsheets (Taken 7/28/2025 2000)  Skin Integrity Remains Intact: Monitor for areas of

## 2025-07-30 LAB
ANION GAP SERPL CALCULATED.3IONS-SCNC: 13 MMOL/L (ref 3–16)
BUN SERPL-MCNC: 43 MG/DL (ref 7–20)
CALCIUM SERPL-MCNC: 8.2 MG/DL (ref 8.3–10.6)
CHLORIDE SERPL-SCNC: 109 MMOL/L (ref 99–110)
CO2 SERPL-SCNC: 18 MMOL/L (ref 21–32)
CREAT SERPL-MCNC: 2.2 MG/DL (ref 0.6–1.2)
GFR SERPLBLD CREATININE-BSD FMLA CKD-EPI: 22 ML/MIN/{1.73_M2}
GLUCOSE BLD-MCNC: 79 MG/DL (ref 70–99)
GLUCOSE SERPL-MCNC: 85 MG/DL (ref 70–99)
MAGNESIUM SERPL-MCNC: 1.82 MG/DL (ref 1.8–2.4)
PERFORMED ON: NORMAL
POTASSIUM SERPL-SCNC: 3.4 MMOL/L (ref 3.5–5.1)
SODIUM SERPL-SCNC: 140 MMOL/L (ref 136–145)

## 2025-07-30 PROCEDURE — 2580000003 HC RX 258: Performed by: INTERNAL MEDICINE

## 2025-07-30 PROCEDURE — 51702 INSERT TEMP BLADDER CATH: CPT

## 2025-07-30 PROCEDURE — 36415 COLL VENOUS BLD VENIPUNCTURE: CPT

## 2025-07-30 PROCEDURE — 94760 N-INVAS EAR/PLS OXIMETRY 1: CPT

## 2025-07-30 PROCEDURE — 6370000000 HC RX 637 (ALT 250 FOR IP): Performed by: HOSPITALIST

## 2025-07-30 PROCEDURE — 83735 ASSAY OF MAGNESIUM: CPT

## 2025-07-30 PROCEDURE — 6360000002 HC RX W HCPCS: Performed by: INTERNAL MEDICINE

## 2025-07-30 PROCEDURE — 2500000003 HC RX 250 WO HCPCS: Performed by: HOSPITALIST

## 2025-07-30 PROCEDURE — 6360000002 HC RX W HCPCS: Performed by: HOSPITALIST

## 2025-07-30 PROCEDURE — 1200000000 HC SEMI PRIVATE

## 2025-07-30 PROCEDURE — 80048 BASIC METABOLIC PNL TOTAL CA: CPT

## 2025-07-30 PROCEDURE — APPNB30 APP NON BILLABLE TIME 0-30 MINS

## 2025-07-30 RX ORDER — POTASSIUM CHLORIDE 7.45 MG/ML
10 INJECTION INTRAVENOUS
Status: COMPLETED | OUTPATIENT
Start: 2025-07-30 | End: 2025-07-30

## 2025-07-30 RX ADMIN — FERROUS SULFATE TAB 325 MG (65 MG ELEMENTAL FE) 325 MG: 325 (65 FE) TAB at 10:32

## 2025-07-30 RX ADMIN — BUSPIRONE HYDROCHLORIDE 15 MG: 5 TABLET ORAL at 10:27

## 2025-07-30 RX ADMIN — SODIUM CHLORIDE, PRESERVATIVE FREE 10 ML: 5 INJECTION INTRAVENOUS at 21:08

## 2025-07-30 RX ADMIN — HEPARIN SODIUM 5000 UNITS: 5000 INJECTION INTRAVENOUS; SUBCUTANEOUS at 10:27

## 2025-07-30 RX ADMIN — POLYVINYL ALCOHOL 1 DROP: 1.4 SOLUTION/ DROPS OPHTHALMIC at 10:28

## 2025-07-30 RX ADMIN — THIAMINE HCL TAB 100 MG 100 MG: 100 TAB at 10:32

## 2025-07-30 RX ADMIN — HEPARIN SODIUM 5000 UNITS: 5000 INJECTION INTRAVENOUS; SUBCUTANEOUS at 21:08

## 2025-07-30 RX ADMIN — KETOTIFEN FUMARATE 1 DROP: 0.25 SOLUTION/ DROPS OPHTHALMIC at 10:28

## 2025-07-30 RX ADMIN — ACETAMINOPHEN 650 MG: 325 TABLET ORAL at 10:27

## 2025-07-30 RX ADMIN — ACETAMINOPHEN 650 MG: 325 TABLET ORAL at 21:08

## 2025-07-30 RX ADMIN — POLYVINYL ALCOHOL 1 DROP: 1.4 SOLUTION/ DROPS OPHTHALMIC at 21:09

## 2025-07-30 RX ADMIN — POTASSIUM CHLORIDE 10 MEQ: 7.46 INJECTION, SOLUTION INTRAVENOUS at 10:24

## 2025-07-30 RX ADMIN — CLOPIDOGREL BISULFATE 75 MG: 75 TABLET, FILM COATED ORAL at 10:27

## 2025-07-30 RX ADMIN — BUSPIRONE HYDROCHLORIDE 15 MG: 5 TABLET ORAL at 21:08

## 2025-07-30 RX ADMIN — Medication 2000 UNITS: at 10:27

## 2025-07-30 RX ADMIN — POTASSIUM CHLORIDE 10 MEQ: 7.46 INJECTION, SOLUTION INTRAVENOUS at 13:46

## 2025-07-30 RX ADMIN — STANDARDIZED SENNA CONCENTRATE AND DOCUSATE SODIUM 1 TABLET: 8.6; 5 TABLET ORAL at 21:08

## 2025-07-30 RX ADMIN — SODIUM CHLORIDE, SODIUM LACTATE, POTASSIUM CHLORIDE, AND CALCIUM CHLORIDE: .6; .31; .03; .02 INJECTION, SOLUTION INTRAVENOUS at 04:58

## 2025-07-30 RX ADMIN — KETOTIFEN FUMARATE 1 DROP: 0.25 SOLUTION/ DROPS OPHTHALMIC at 21:09

## 2025-07-30 RX ADMIN — OXYCODONE HYDROCHLORIDE AND ACETAMINOPHEN 500 MG: 500 TABLET ORAL at 10:27

## 2025-07-30 RX ADMIN — POTASSIUM CHLORIDE 10 MEQ: 7.46 INJECTION, SOLUTION INTRAVENOUS at 12:01

## 2025-07-30 ASSESSMENT — PAIN SCALES - PAIN ASSESSMENT IN ADVANCED DEMENTIA (PAINAD)
BODYLANGUAGE: RELAXED
TOTALSCORE: 2
BREATHING: NORMAL
FACIALEXPRESSION: SAD, FRIGHTENED, FROWN
NEGVOCALIZATION: OCCASIONAL MOAN/GROAN, LOW SPEECH, NEGATIVE/DISAPPROVING QUALITY
CONSOLABILITY: NO NEED TO CONSOLE

## 2025-07-30 NOTE — PROGRESS NOTES
[facility] Note    Patient Active Problem List   Diagnosis    Anxiety state    Essential hypertension, benign    Hearing loss    Vitamin D deficiency    Severe Osteopenia    Abdominal aortic aneurysm    Hyperlipidemia    Old myocardial infarction    Peptic ulcer    Postsurgical percutaneous transluminal coronary angioplasty status    Cataracts, bilateral    Vascular disease    DENAE (acute kidney injury)    Hypokalemia    Hypernatremia    Alzheimer's disease (HCC)    Bradycardia       Past Medical History:   has a past medical history of Acute myocardial infarction, unspecified site, episode of care unspecified, Alzheimer's disease (HCC), Anxiety state, unspecified, Coronary atherosclerosis of unspecified type of vessel, native or graft, Essential hypertension, benign, Gastritis, Osteopenia, Other and unspecified hyperlipidemia, Screening mammogram, Severe Osteopenia, and Unspecified hearing loss.    Past Social History:   reports that she quit smoking about 28 years ago. Her smoking use included cigarettes. She started smoking about 36 years ago. She has a 8 pack-year smoking history. She has never used smokeless tobacco. She reports that she does not currently use alcohol. She reports that she does not use drugs.    Subjective:    Family contemplating hospice care    Review of Systems  Not able to fully cooperate    Objective:    BP (!) 155/88   Pulse 72   Temp (!) 95.1 °F (35.1 °C) (Axillary)   Resp 16   Ht 1.6 m (5' 2.99\")   Wt 47 kg (103 lb 9.6 oz)   SpO2 96%   BMI 18.36 kg/m²     Wt Readings from Last 3 Encounters:   07/30/25 47 kg (103 lb 9.6 oz)   09/20/20 72.6 kg (160 lb)   03/30/20 71.9 kg (158 lb 8.2 oz)       BP Readings from Last 3 Encounters:   07/30/25 (!) 155/88   06/25/21 126/70   09/20/20 (!) 143/53         Chest- clear  Heart-regular  Abd-soft  Ext- no edema    Labs  Hemoglobin   Date Value Ref Range Status   07/24/2025 9.1 (L) 12.0 - 16.0 g/dL Final     Hematocrit

## 2025-07-30 NOTE — PLAN OF CARE
Problem: Neurosensory - Adult  Goal: Achieves stable or improved neurological status  Outcome: Not Progressing  Goal: Achieves maximal functionality and self care  Outcome: Not Progressing     Problem: Musculoskeletal - Adult  Goal: Return mobility to safest level of function  Outcome: Not Progressing  Goal: Return ADL status to a safe level of function  Outcome: Not Progressing     Problem: Gastrointestinal - Adult  Goal: Maintains adequate nutritional intake  Outcome: Not Progressing     Problem: Safety - Adult  Goal: Free from fall injury  Outcome: Progressing     Problem: Pain  Goal: Verbalizes/displays adequate comfort level or baseline comfort level  Outcome: Progressing  Flowsheets (Taken 7/29/2025 2004)  Verbalizes/displays adequate comfort level or baseline comfort level:   Encourage patient to monitor pain and request assistance   Administer analgesics based on type and severity of pain and evaluate response   Assess pain using appropriate pain scale   Implement non-pharmacological measures as appropriate and evaluate response   Consider cultural and social influences on pain and pain management   Notify Licensed Independent Practitioner if interventions unsuccessful or patient reports new pain     Problem: Cardiovascular - Adult  Goal: Maintains optimal cardiac output and hemodynamic stability  Outcome: Progressing  Flowsheets (Taken 7/29/2025 2312)  Maintains optimal cardiac output and hemodynamic stability:   Monitor blood pressure and heart rate   Monitor urine output and notify Licensed Independent Practitioner for values outside of normal range   Assess for signs of decreased cardiac output   Administer fluid and/or volume expanders as ordered   Administer vasoactive medications as ordered     Problem: Metabolic/Fluid and Electrolytes - Adult  Goal: Electrolytes maintained within normal limits  Outcome: Progressing  Flowsheets (Taken 7/29/2025 2312)  Electrolytes maintained within normal limits:    Progressing  Flowsheets (Taken 7/29/2025 2317)  Skin Integrity Remains Intact:   Monitor for areas of redness and/or skin breakdown   Assess vascular access sites hourly     Problem: Gastrointestinal - Adult  Goal: Minimal or absence of nausea and vomiting  Outcome: Progressing  Goal: Maintains or returns to baseline bowel function  Outcome: Progressing     Problem: Genitourinary - Adult  Goal: Absence of urinary retention  Outcome: Progressing  Goal: Urinary catheter remains patent  Outcome: Progressing     Problem: Neurosensory - Adult  Goal: Achieves stable or improved neurological status  Outcome: Not Progressing  Goal: Achieves maximal functionality and self care  Outcome: Not Progressing     Problem: Musculoskeletal - Adult  Goal: Return mobility to safest level of function  Outcome: Not Progressing  Goal: Return ADL status to a safe level of function  Outcome: Not Progressing     Problem: Gastrointestinal - Adult  Goal: Maintains adequate nutritional intake  Outcome: Not Progressing

## 2025-07-30 NOTE — PROGRESS NOTES
[facility]    Met with pts sister Brenna, NATI Guan, and grandchild Loi to discuss hospice philosophy, levels of care, and services. Discussed what it would look like with pt returning to facility with hospice following. Time spent listening to events of illness, and answering questions. Discussed code status with family as well, at this time they are undecided. Family on board with pt returning to facility with hospice following. Family under the impression pt will be discharging on Friday. Plan will be for HOC liaison to FU with family tmrw 7/31 to confirm plan and check with facility in regards to medical equipment needed if any at facility. HOC informational folder along with contact info was provided to the family. HAL Vu, unit RN, and CNP with [facility] all made aware.     Barbara Gan, BSN, RN  [phone]

## 2025-07-30 NOTE — PROGRESS NOTES
V2.0    [facility] [place]      Name:  Nica Ram /Age/Sex: 1945  (79 y.o. female)   MRN & CSN:  [id number] & [id number] Encounter Date/Time: 2025 12:23 PM EDT   Location:  5T-5576/5576-01 PCP: Damon Patel MD     Attending:Sarah Pizano MD       Hospital Day: 8    Assessment and Recommendations   Nica Ram is a 79 y.o. female with pmh of advanced Alzheimer's dementia, who presents with DENAE (acute kidney injury)      Patient was seen, evaluated this morning.  Nursing staff at the bedside  Patient's mentation somewhat improved this morning as she is answering some questions  Was refusing to eat.  Nephrology on board, following continues to be on IV fluid resuscitation with significant improvement of her kidney function    CT brain shows interval growth of large extra-axial mass in the right sesamoid with mass effect on the rohith, right temporal lobe, pituitary glands, right cavernous carotid artery suggestive of large skull base meningioma.   Unable to do MRI due to her mentation, most likely will not be able to hold still for it  Will consult neurology for their recommendations      Labs obtained this morning showing sodium 140, potassium 3.4 will replace, creatinine 2.2 significant improvement from presentation at 6.0, improved from yesterday at 2.6, GFR 22    Will consult palliative care to discuss goals of care.    Plan:   Hyponatremia-resolved  Likely in the setting of acute kidney failure, hypovolemia  Will monitor sodium with routine labs    2.  Acute renal failure  Presented with creatinine 6.0, GFR 7  Patient currently out of the ICU  Labs have improved to creatinine 2.6, GFR 18  IV fluid resuscitation  Avoid hypotension, avoid neurotoxic agent  Follow monitor kidney function  Nephrology on board and following, recommending no need for RRT at this time    3.  Hypotension improved  Most likely in setting of hyponatremia which is resolved, decreased p.o.  Midodrine 10 mg 3    SpO2: 96%  94% 97%   Weight:  47 kg (103 lb 9.6 oz)     Height:             Physical Exam:      Physical Exam Performed:    /72   Pulse 70   Temp 98.5 °F (36.9 °C) (Axillary)   Resp 16   Ht 1.6 m (5' 2.99\")   Wt 47 kg (103 lb 9.6 oz)   SpO2 97%   BMI 18.36 kg/m²     General appearance: No apparent distress, cooperative.  HEENT: Pupils equal, round, and reactive to light.   Neck: Supple, with full range of motion.  Trachea midline.  Respiratory:  Normal respiratory effort.   Cardiovascular: Regular rate and rhythm   Abdomen: Soft, non-tender, non-distended   Musculoskeletal: No edema bilaterally.  F  Skin: Skin color, texture, turgor normal.  No rashes or lesions.  Neurologic:  Neurovascularly intact  Psychiatric: Alert not oriented  Capillary Refill: Brisk, 3 seconds, normal   Peripheral Pulses: +2 palpable, equal bilaterally       Medications:   Medications:    potassium chloride  10 mEq IntraVENous Q1H    midodrine  10 mg Oral TID WC    acetaminophen  650 mg Oral TID    ammonium lactate   Topical BID    ascorbic acid  500 mg Oral Daily    busPIRone  15 mg Oral BID    clopidogrel  75 mg Oral Daily    polyvinyl alcohol  1 drop Both Eyes BID    ferrous sulfate  325 mg Oral Daily    ketotifen fumarate  1 drop Both Eyes BID    acidophilus  1 packet Oral Daily    pantoprazole  40 mg Oral Daily    sennosides-docusate sodium  1 tablet Oral Nightly    vitamin B-1  100 mg Oral Daily    Vitamin D  2,000 Units Oral Daily    sodium chloride flush  5-40 mL IntraVENous 2 times per day    heparin (porcine)  5,000 Units SubCUTAneous BID    zinc oxide   Topical BID      Infusions:    lactated ringers 80 mL/hr at 07/30/25 0458    dextrose      sodium chloride       PRN Meds: dextrose bolus, 125 mL, PRN   Or  dextrose bolus, 250 mL, PRN  glucagon (rDNA), 1 mg, PRN  dextrose, , Continuous PRN  sodium chloride flush, 5-40 mL, PRN  sodium chloride, , PRN  ondansetron, 4 mg, Q8H PRN   Or  ondansetron, 4 mg, Q6H

## 2025-07-30 NOTE — PROGRESS NOTES
PALLIATIVE MEDICINE PROGRESS NOTE     Patient name:Nica Ram    MRN:[id number] :1945  Room/Bed:N-5576/5576-01    LOS: 7 days        ASSESSMENT/RECOMMENDATIONS   79 y.o. female with FTT with DENAE and advanced Alzheimer         Symptom Management:  FTT- pt with 20 lb weight loss in 6 months with muscle wasting and poor appetite   DENAE- Na 170 on admission with improvement to 140 today with Cr of 6.0 on admission down to 2.2 today.   Goals of Care- Pt oriented to self with non-sensical speech. She continues to refuse medications and refuses to eat or drink. Family meeting with Loi who is grandson and pts sister Brenna. Following introductions, a broad overview of Nica's clinical course was given, followed by the opportunity to ask any questions regarding the medical aspects of the case. I explained the typical trajectory that occurs when age and frailty meet chronic medical illness, which involves increasingly frequent hospitalizations (along with the nosocomial complications they carry) and a progressive decline in performance status (as well as potential for rehabilitation) until an unsurvivable event occurs. Emphasis was placed on the subjective nature of quality of life, and what a person is willing to endure to stay alive, even if his/her medical status continues to decline. This led to a review of Nica’s wishes as understood by family, with the specific question: Would Nica want to continue life-prolonging treatment if she could understand the current medical reality of his condition?  Sister Brenna states that she thought pt had filled out a DNR form prior. We discussed nature dementia and that once IV fluids are stopped for DC that pt will likely be back to dehydration and rising Cr and malnutrition as that she is refusing to eat and drink repeatedly and that this is an issue that has been increasing at baseline      Patient/Family Goals of Care :    Family meeting with granddanielle Monsivais

## 2025-07-30 NOTE — PROGRESS NOTES
Patient with baseline advanced dementia. Patient only able alert to self. Patient was able to take medications crushed in pudding. Patient was able to get eye drops. Patient unaware of resendiz in place and wanting to get up to void. Attempted to educate patient but will need continuous re-educating. Patient in bed, bed in lowest position, call light within reach. Bed alarm on. Plan of care ongoing.

## 2025-07-30 NOTE — PLAN OF CARE
Problem: Safety - Adult  Goal: Free from fall injury  7/30/2025 1005 by Malathi Patel RN  Outcome: Progressing  Flowsheets (Taken 7/30/2025 1005)  Free From Fall Injury:   Instruct family/caregiver on patient safety   Based on caregiver fall risk screen, instruct family/caregiver to ask for assistance with transferring infant if caregiver noted to have fall risk factors     Problem: Pain  Goal: Verbalizes/displays adequate comfort level or baseline comfort level  7/30/2025 1005 by Malathi Patel RN  Outcome: Progressing  Flowsheets (Taken 7/30/2025 1005)  Verbalizes/displays adequate comfort level or baseline comfort level: Encourage patient to monitor pain and request assistance     Problem: Cardiovascular - Adult  Goal: Maintains optimal cardiac output and hemodynamic stability  7/30/2025 1005 by Malathi Patel RN  Outcome: Completed     Problem: Metabolic/Fluid and Electrolytes - Adult  Goal: Electrolytes maintained within normal limits  7/30/2025 1005 by Malathi Patel RN  Outcome: Progressing  Flowsheets (Taken 7/30/2025 1005)  Electrolytes maintained within normal limits: Monitor labs and assess patient for signs and symptoms of electrolyte imbalances     Problem: Metabolic/Fluid and Electrolytes - Adult  Goal: Hemodynamic stability and optimal renal function maintained  7/30/2025 1005 by Malathi Patel RN  Outcome: Progressing  Flowsheets (Taken 7/30/2025 1005)  Hemodynamic stability and optimal renal function maintained: Monitor labs and assess for signs and symptoms of volume excess or deficit     Problem: Neurosensory - Adult  Goal: Achieves stable or improved neurological status  7/30/2025 1005 by Malathi Patel RN  Outcome: Completed     Problem: Neurosensory - Adult  Goal: Achieves maximal functionality and self care  7/30/2025 1005 by Malathi Patel RN  Outcome: Completed     Problem: Skin/Tissue Integrity - Adult  Goal: Skin integrity remains

## 2025-07-30 NOTE — PROGRESS NOTES
Neurology note    Consulted for extra-axial mass seen on CT head.  Neurology was consulted.  This is normally not seen by our service.  Chart reviewed, patient family to have meeting with palliative care later today.  Would recommend neurosurgery consultation depending on GOC.    Discussed with primary team.

## 2025-07-31 VITALS
DIASTOLIC BLOOD PRESSURE: 61 MMHG | WEIGHT: 103.6 LBS | RESPIRATION RATE: 16 BRPM | HEART RATE: 67 BPM | HEIGHT: 63 IN | BODY MASS INDEX: 18.36 KG/M2 | OXYGEN SATURATION: 96 % | TEMPERATURE: 98 F | SYSTOLIC BLOOD PRESSURE: 117 MMHG

## 2025-07-31 PROBLEM — R07.9 CHEST PAIN: Status: ACTIVE | Noted: 2025-07-31

## 2025-07-31 LAB
GLUCOSE BLD-MCNC: 145 MG/DL (ref 70–99)
GLUCOSE BLD-MCNC: 70 MG/DL (ref 70–99)
GLUCOSE BLD-MCNC: 72 MG/DL (ref 70–99)
GLUCOSE BLD-MCNC: 74 MG/DL (ref 70–99)
PERFORMED ON: ABNORMAL
PERFORMED ON: NORMAL

## 2025-07-31 PROCEDURE — 2500000003 HC RX 250 WO HCPCS: Performed by: HOSPITALIST

## 2025-07-31 PROCEDURE — 2580000003 HC RX 258: Performed by: INTERNAL MEDICINE

## 2025-07-31 PROCEDURE — 2580000003 HC RX 258: Performed by: HOSPITALIST

## 2025-07-31 PROCEDURE — 6360000002 HC RX W HCPCS: Performed by: HOSPITALIST

## 2025-07-31 PROCEDURE — 94760 N-INVAS EAR/PLS OXIMETRY 1: CPT

## 2025-07-31 RX ORDER — HYOSCYAMINE SULFATE 0.12 MG/1
0.12 TABLET SUBLINGUAL EVERY 4 HOURS PRN
Qty: 18 TABLET | Refills: 0 | Status: SHIPPED | OUTPATIENT
Start: 2025-07-31 | End: 2025-08-03

## 2025-07-31 RX ORDER — MORPHINE SULFATE 20 MG/5ML
5 SOLUTION ORAL EVERY 4 HOURS PRN
Qty: 22.5 ML | Refills: 0 | Status: SHIPPED | OUTPATIENT
Start: 2025-07-31 | End: 2025-08-03

## 2025-07-31 RX ORDER — SENNA AND DOCUSATE SODIUM 50; 8.6 MG/1; MG/1
1 TABLET, FILM COATED ORAL DAILY PRN
Qty: 3 TABLET | Refills: 0 | Status: SHIPPED | OUTPATIENT
Start: 2025-07-31 | End: 2025-08-03

## 2025-07-31 RX ORDER — LORAZEPAM 0.5 MG/1
0.5 TABLET ORAL EVERY 6 HOURS PRN
Qty: 10 TABLET | Refills: 0 | Status: SHIPPED | OUTPATIENT
Start: 2025-07-31 | End: 2025-08-03

## 2025-07-31 RX ADMIN — SODIUM CHLORIDE, SODIUM LACTATE, POTASSIUM CHLORIDE, AND CALCIUM CHLORIDE: .6; .31; .03; .02 INJECTION, SOLUTION INTRAVENOUS at 04:56

## 2025-07-31 RX ADMIN — DEXTROSE 125 ML: 10 SOLUTION INTRAVENOUS at 12:51

## 2025-07-31 RX ADMIN — SODIUM CHLORIDE, PRESERVATIVE FREE 10 ML: 5 INJECTION INTRAVENOUS at 08:15

## 2025-07-31 RX ADMIN — HEPARIN SODIUM 5000 UNITS: 5000 INJECTION INTRAVENOUS; SUBCUTANEOUS at 08:18

## 2025-07-31 NOTE — PROGRESS NOTES
[facility] Note    Patient Active Problem List   Diagnosis    Anxiety state    Essential hypertension, benign    Hearing loss    Vitamin D deficiency    Severe Osteopenia    Abdominal aortic aneurysm    Hyperlipidemia    Old myocardial infarction    Peptic ulcer    Postsurgical percutaneous transluminal coronary angioplasty status    Cataracts, bilateral    Vascular disease    DENAE (acute kidney injury)    Hypokalemia    Hypernatremia    Alzheimer's disease (HCC)    Bradycardia    Chest pain       Past Medical History:   has a past medical history of Acute myocardial infarction, unspecified site, episode of care unspecified, Alzheimer's disease (HCC), Anxiety state, unspecified, Coronary atherosclerosis of unspecified type of vessel, native or graft, Essential hypertension, benign, Gastritis, Osteopenia, Other and unspecified hyperlipidemia, Screening mammogram, Severe Osteopenia, and Unspecified hearing loss.    Past Social History:   reports that she quit smoking about 28 years ago. Her smoking use included cigarettes. She started smoking about 36 years ago. She has a 8 pack-year smoking history. She has never used smokeless tobacco. She reports that she does not currently use alcohol. She reports that she does not use drugs.    Subjective:    No complaints today, unable to verbalize well  Family contemplating hospice care    Review of Systems  Not able to fully cooperate    Objective:    /61   Pulse 67   Temp 98 °F (36.7 °C) (Axillary)   Resp 16   Ht 1.6 m (5' 2.99\")   Wt 47 kg (103 lb 9.6 oz)   SpO2 96%   BMI 18.36 kg/m²     Wt Readings from Last 3 Encounters:   07/30/25 47 kg (103 lb 9.6 oz)   09/20/20 72.6 kg (160 lb)   03/30/20 71.9 kg (158 lb 8.2 oz)       BP Readings from Last 3 Encounters:   07/31/25 117/61   06/25/21 126/70   09/20/20 (!) 143/53         Chest- clear  Heart-regular  Abd-soft  Ext- no edema    Labs  Hemoglobin   Date Value Ref Range Status   07/24/2025  9.1 (L) 12.0 - 16.0 g/dL Final     Hematocrit   Date Value Ref Range Status   07/24/2025 27.1 (L) 36.0 - 48.0 % Final     WBC   Date Value Ref Range Status   07/24/2025 4.8 4.0 - 11.0 K/uL Final     Platelets   Date Value Ref Range Status   07/24/2025 59 (L) 135 - 450 K/uL Final     Lab Results   Component Value Date    CREATININE 2.2 (H) 07/30/2025    BUN 43 (H) 07/30/2025     07/30/2025    K 3.4 (L) 07/30/2025     07/30/2025    CO2 18 (L) 07/30/2025     Uosm 476  Andre 21  UK 35    Repeat venous blood gas showing a pH of 7.55 and pCO2 of 21    Labs today not available    Assessment/Plan:  1.  Severe DENAE-probably from prerenal azotemia leadin to ATN in the setting of hypotension plus losartan/HCTZ use.  Now non-oliguric.  BUN and crea better.  No need for acute RRT.  Continue LR infusion  2.  Hypokalemia  3.  Hypomagnesemia-now WNL  4.  Hypotension-IV fluid resuscitation.  Increased Midodrine to 10mg po TID.  Better.   5.  Severe hypernatremia-Na at goal.  Follow sodium daily.  DI unlikely.   Switched to LR.   6.  History of coronary artery disease  7.  History of Alzheimer's dementia  8.  Low serum HCO3-in setting of DENAE.  Now showing respiratory alkalosis.   9.  DNR-CCA and discharging to Hospice today      Karthik Delgado MD

## 2025-07-31 NOTE — PROGRESS NOTES
IV removed.  Telemetry removed.  Belongings packed in bag.  Grandson at bedside.  Awaiting transport to facility.

## 2025-07-31 NOTE — PROGRESS NOTES
PALLIATIVE MEDICINE PROGRESS NOTE     Patient name:Nica Ram    MRN:[id number] :1945  Room/Bed:[facility]-5576/5576-01    LOS: 8 days        ASSESSMENT/RECOMMENDATIONS   79 y.o. female with FTT with DENAE and advanced Alzheimer         Symptom Management:  FTT- pt with 20 lb weight loss in 6 months with muscle wasting and poor appetite   DENAE- Na 170 on admission with improvement to 140 today with Cr of 6.0 on admission down to 2.2 last draw   Goals of Care- Pt oriented to self with non-sensical speech. Family has decided on Hospice and comfort care at DC. Plan to DC back to LTC with Hospice support      Patient/Family Goals of Care :    DNRCCA and HOC meeting to enroll       Disposition/Discharge Plan:   Per families choice referral submitted to [facility] fax# [phone], phone # [phone]. Referral also communicated to liaison Barbara, Hospitalist and . Please refer to HOC for any updates in enrollment or discharge plan.     Advance Directives:     The patient has appointed the following active healthcare agents:  Brenna-sister   Loi-grandson      The Patient has the following current code status:    Code Status: DNRCCA        Interactive exchange regarding medications,tests and procedures with: patient, floor RN, Dr Pizano  Thank you for allowing us to participate in the care of this patient.      SUBJECTIVE     Chief Complaint: AMS    Last 24 hours:   Pt continues to refuse care as well as food drink and meds at times     ROS:    Review of Systems   Unable to perform ROS: Dementia                 Physical Exam  Constitutional:       Appearance: She is ill-appearing.   HENT:      Head: Normocephalic and atraumatic.      Nose: No congestion.      Mouth/Throat:      Mouth: Mucous membranes are dry.   Eyes:      Pupils: Pupils are equal, round, and reactive to light.   Cardiovascular:      Rate and Rhythm: Normal rate.      Heart sounds: No murmur heard.     No friction rub. No  gallop.   Pulmonary:      Effort: No respiratory distress.   Abdominal:      Palpations: Abdomen is soft.   Musculoskeletal:      Cervical back: Normal range of motion.   Skin:     General: Skin is warm and dry.      Coloration: Skin is pale.   Neurological:      Mental Status: She is alert.      Comments: limited verbal responses             Palliative Medicine Interventions:    patient/family support  Goals of Care discussions with patient/surrogate  Spiritual Interventions: no needs identified today         DATA:  Current labs in the epic chart reviewed as of 2025   Review of previous notes, admits, labs, radiology and testing relevant to this consult done in this chart today 2025    Data Reviewed related to this consultation:    Review of prior external note(s) from each unique source relevant to today's visit: Hospitalist, Case management, nephrology   Discussion of management or test with external physician/qualified health care professional: Hospitalist, Case management, nephrology   Unique test results reviewed: CBC and BMP    Risk of Complications/Morbidity: High   Illness(es)/ Infection present that pose threat to bodily function.   There is potential for severe exacerbation of condition/side effects of treatment.  Therapy requires intensive monitoring for toxicity    Time spent on [facility] minutes    Counseling and educating the patient/family/caregiver  Preparing to see the patient (e.g., review of tests)  Referring / communicating with other healthcare professionals including care coordination (not separately reported): Hospitalist, Case management  Documenting clinical information in the electronic health record     Signed By: Electronically signed by DAVE Bowen CNP on 2025 at 12:15 PM   Palliative Medicine     2025

## 2025-07-31 NOTE — PROGRESS NOTES
Head to toe assessment complete.  VSS.  Attempted AM medications.  Pt closed mouth with offered pudding and oatmeal.  Attempted eye drops, but pt turned head side to side and stated \"don't touch my head\".  Able to administer heparin injection.  Attempted to reposition pt in bed.  Pt resumed previous position. Will attempt to administer medication as pt allows throughout the day. Bed alarm set.

## 2025-07-31 NOTE — PROGRESS NOTES
Attempted morning/noon medications.  Pt pushed spoon away from mouth when trying to administer.  Pt states \"I don't want any of that\".  Attempted to explain that it was her medication, pt states \"I don't need that\".

## 2025-07-31 NOTE — CONSULTS
Clinical Ethics Consultation Note    Ethics consulted by care team to assist in determining appropriate goals of care.     Pt brought to ED via EMS from [facility]. Pt has Alzheimer's/end-stage dementia. Per care team, pt has been incapacitated since arrival. Pt at times is refusing meds and food. Pt's most appropriate surrogate decision maker at this time is pt's sister, Brenna. Pt's grandson, Loi, has also been involved in pt's care. Both have been present at the bedside.       Yesterday 07/30/2025 palliative care had family meeting with Brenna and Loi. Given the pt's current clinical condition, it was explained to pt's family that additional decline is anticipated. At the time, family was considering DNR-CCA, as well as pt returning to facility with hospice support. Hospice later met with pt's family. Plan is for hospice to follow-up with family again today. Code status remains full code at this time.       Ethics will continue to follow, pending additional family meeting with hospice.       Thank you for allowing me to participate in the care of this pt.     Citlalli Ames, Director of [facility], [phone]    Electronically signed by Citlalli Ames on 7/31/2025 at 9:48 AM      07/31/2025 1538  Ethics completed review of chart.   Plan is for pt to discharge back to [facility] with hospice. Current code status is DNR-CCA. Both pt's sister and grandson are agreeable to this plan.     Ethics signing off.     Electronically signed by Citlalli Ames on 7/31/2025 at 3:39 PM

## 2025-07-31 NOTE — DISCHARGE INSTR - COC
Continuity of Care Form    Patient Name: Nica Ram   :  1945  MRN:  [id number]    Admit date:  2025  Discharge date:  ***    Code Status Order: DNR-CCA   Advance Directives:     Admitting Physician:  Handy Zaldivar MD  PCP: Damon Patel MD    Discharging Nurse: ***  Discharging Hospital Unit/Room#: 5TN-5576/5576-01  Discharging Unit Phone Number: ***    Emergency Contact:   Extended Emergency Contact Information  Primary Emergency Contact: YoFredy  Address: [address]           [place] [address]  Home Phone: [phone]  Mobile Phone: [phone]  Relation: Grandchild  Secondary Emergency Contact: Loi Ram  Address: [address] [place] [address] [address]           [place] [address]  Home Phone: [phone]  Mobile Phone: [phone]  Relation: Grandchild    Past Surgical History:  Past Surgical History:   Procedure Laterality Date    PTCA      UPPER GASTROINTESTINAL ENDOSCOPY  15    Dr. Eben Diaz    UPPER GASTROINTESTINAL ENDOSCOPY N/A 3/26/2020    EGD BIOPSY performed by Andrea Green DO at [facility] ENDOSCOPY       Immunization History:   Immunization History   Administered Date(s) Administered    COVID-19, PFIZER Bivalent, DO NOT Dilute, (age 12y+), IM, 30 mcg/0.3 mL 2022    COVID-19, PFIZER PURPLE top, DILUTE for use, (age 12 y+), 30mcg/0.3mL 02/15/2021, 2022       Active Problems:  Patient Active Problem List   Diagnosis Code    Anxiety state F41.1    Essential hypertension, benign I10    Hearing loss H91.90    Vitamin D deficiency E55.9    Severe Osteopenia M85.80    Abdominal aortic aneurysm I71.40    Hyperlipidemia E78.5    Old myocardial infarction I25.2    Peptic ulcer K27.9    Postsurgical percutaneous transluminal coronary angioplasty status Z98.61    Cataracts, bilateral H26.9    Vascular disease I99.9    DENAE (acute kidney injury) N17.9    Hypokalemia E87.6    Hypernatremia E87.0    Alzheimer's disease (HCC) G30.9, F02.80     Discharge:   Respiratory Treatments: ***  Oxygen Therapy:  {Therapy; copd oxygen:08889}  Ventilator:    {[facility] Vent List:797932631}    Rehab Therapies: {THERAPEUTIC INTERVENTION:8790608361}  Weight Bearing Status/Restrictions: {[facility] Weight Bearin}  Other Medical Equipment (for information only, NOT a DME order):  {EQUIPMENT:284217236}  Other Treatments: ***    Patient's personal belongings (please select all that are sent with patient):  {[facility] DME Belongings:106992045}    RN SIGNATURE:  {Esignature:373577377}    CASE MANAGEMENT/SOCIAL WORK SECTION    Inpatient Status Date: ***    Readmission Risk Assessment Score:  [facility] RISK OF UNPLANNED READMISSION 2.0             14.8 Total Score        Discharging to Facility/ Agency   Name:   Address:  Phone:  Fax:    Dialysis Facility (if applicable)   Name:  Address:  Dialysis Schedule:  Phone:  Fax:    / signature: {Esignature:734261706}    PHYSICIAN SECTION    Prognosis: guarded    Condition at Discharge: poor    Rehab Potential (if transferring to Rehab): guarded    Recommended Labs or Other Treatments After Discharge:     Physician Certification: I certify the above information and transfer of Nica Ram  is necessary for the continuing treatment of the diagnosis listed and that she requires Skilled Nursing Facility for less 30 days.     Update Admission H&P: No change in H&P    PHYSICIAN SIGNATURE:  Electronically signed by Handy Zaldivar MD on 25 at 12:59 PM EDT

## 2025-07-31 NOTE — PLAN OF CARE
Problem: Safety - Adult  Goal: Free from fall injury  7/31/2025 1022 by Belinda Rojas RN  Outcome: Progressing  7/31/2025 0200 by Sunil Mir RN  Outcome: Progressing     Problem: Pain  Goal: Verbalizes/displays adequate comfort level or baseline comfort level  7/31/2025 1022 by Belinda Rojas RN  Outcome: Progressing  7/31/2025 0200 by Sunil Mir RN  Outcome: Progressing  Flowsheets (Taken 7/30/2025 1715)  Verbalizes/displays adequate comfort level or baseline comfort level:   Encourage patient to monitor pain and request assistance   Assess pain using appropriate pain scale   Administer analgesics based on type and severity of pain and evaluate response   Implement non-pharmacological measures as appropriate and evaluate response   Consider cultural and social influences on pain and pain management   Notify Licensed Independent Practitioner if interventions unsuccessful or patient reports new pain     Problem: Metabolic/Fluid and Electrolytes - Adult  Goal: Electrolytes maintained within normal limits  7/31/2025 1022 by Belinda Rojas RN  Outcome: Progressing  7/31/2025 0200 by Sunil Mir RN  Outcome: Progressing  Flowsheets (Taken 7/30/2025 2113)  Electrolytes maintained within normal limits:   Monitor labs and assess patient for signs and symptoms of electrolyte imbalances   Administer electrolyte replacement as ordered   Monitor response to electrolyte replacements, including repeat lab results as appropriate   Fluid restriction as ordered  Goal: Hemodynamic stability and optimal renal function maintained  7/31/2025 1022 by Belinda Rojas RN  Outcome: Progressing  7/31/2025 0200 by Sunil Mir RN  Outcome: Progressing  Flowsheets (Taken 7/30/2025 2113)  Hemodynamic stability and optimal renal function maintained:   Monitor labs and assess for signs and symptoms of volume excess or deficit   Monitor intake, output and patient weight     Problem: Skin/Tissue

## 2025-07-31 NOTE — PLAN OF CARE
Problem: Safety - Adult  Goal: Free from fall injury  Outcome: Progressing     Problem: Pain  Goal: Verbalizes/displays adequate comfort level or baseline comfort level  Outcome: Progressing  Flowsheets (Taken 7/30/2025 1715)  Verbalizes/displays adequate comfort level or baseline comfort level:   Encourage patient to monitor pain and request assistance   Assess pain using appropriate pain scale   Administer analgesics based on type and severity of pain and evaluate response   Implement non-pharmacological measures as appropriate and evaluate response   Consider cultural and social influences on pain and pain management   Notify Licensed Independent Practitioner if interventions unsuccessful or patient reports new pain     Problem: Metabolic/Fluid and Electrolytes - Adult  Goal: Electrolytes maintained within normal limits  Outcome: Progressing  Flowsheets (Taken 7/30/2025 2113)  Electrolytes maintained within normal limits:   Monitor labs and assess patient for signs and symptoms of electrolyte imbalances   Administer electrolyte replacement as ordered   Monitor response to electrolyte replacements, including repeat lab results as appropriate   Fluid restriction as ordered  Goal: Hemodynamic stability and optimal renal function maintained  Outcome: Progressing  Flowsheets (Taken 7/30/2025 2113)  Hemodynamic stability and optimal renal function maintained:   Monitor labs and assess for signs and symptoms of volume excess or deficit   Monitor intake, output and patient weight     Problem: Skin/Tissue Integrity  Goal: Skin integrity remains intact  Description: 1.  Monitor for areas of redness and/or skin breakdown  2.  Assess vascular access sites hourly  3.  Every 4-6 hours minimum:  Change oxygen saturation probe site  4.  Every 4-6 hours:  If on nasal continuous positive airway pressure, respiratory therapy assess nares and determine need for appliance change or resting period  Outcome: Progressing  Flowsheets

## 2025-07-31 NOTE — PROGRESS NOTES
[facility]    TC made to pts GERARDO, sister Brenna, and then to granddanielle Monsivais to confirm they were agreeable to pt retuning today back to [facility] with hospice following. Spoke with Kwesi at [facility] who confirms pt may return today with hospice following. No DME's are needed at this time. Obtained discharge order and comfort meds scripts from MD. Discharge packet completed and given to [facility] for transport. Scripts are still needing to be signed before being placed in discharge packet. HAL Vu MD, and [facility] all made aware. Updated facility of pts transport time. Facility number ([phone]) was provided to unit RN to call report. Brenna and Loi have been updated as well.     Barbara Gan, BSN, RN  [phone]

## 2025-08-03 NOTE — PROGRESS NOTES
Hospitalist Discharge Summary     Nica Ram  : 1945  MRN: [id number]    Admit date: 2025  Discharge date: 2025    Admitting Physician: Handy Zaldivar MD    Discharge Diagnoses:   Patient Active Problem List   Diagnosis    Anxiety state    Essential hypertension, benign    Hearing loss    Vitamin D deficiency    Severe Osteopenia    Abdominal aortic aneurysm    Hyperlipidemia    Old myocardial infarction    Peptic ulcer    Postsurgical percutaneous transluminal coronary angioplasty status    Cataracts, bilateral    Vascular disease    DENAE (acute kidney injury)    Hypokalemia    Hypernatremia    Alzheimer's disease (HCC)    Bradycardia    Chest pain       Admission Condition: poor    Discharged Condition: poor    Discharge Exam:  VITALS:  /61   Pulse 67   Temp 98 °F (36.7 °C) (Axillary)   Resp 16   Ht 1.6 m (5' 2.99\")   Wt 47 kg (103 lb 9.6 oz)   SpO2 96%   BMI 18.36 kg/m²   CONSTITUTIONAL:  lethargic, wakes up on calling but mumbles with eyes closed  LUNGS:  clear to auscultation bilaterally, No increased work of breathing, good air exchange, no crackles or wheezing  CARDIOVASCULAR:  Regular rate and rhythm, normal S1 and S2, no S3 or S4, and no significant murmurs, rubs or gallops noted.   NEUROLOGIC:  Awake,   confused    Hospital Course:      79 y.o. female with pmh of advanced Alzheimer's dementia, who presents with DENAE ,hypotension, hyponatremia which was Improving w iv fluid but oral intake remains very pooor.pt was though to  be in baseline end stage dementia and family decided on hospice care at SNF      Meningioma?  Imaging shows signs of meningioma with  large extra-axial mass in the right sesamoid with mass effect on the rohith, right temporal lobe, pituitary glands, right cavernous carotid artery suggestive of large skull base meningioma.   Unable to do MRI due to patient's mentation, most likely she will be able to sit through the MRI due to her advanced dementia  Will

## 2025-08-04 NOTE — DISCHARGE SUMMARY
Hospitalist Discharge Summary     Nica Ram  : 1945  MRN: [id number]    Admit date: 2025  Discharge date: 2025    Admitting Physician: Handy Zaldivar MD    Discharge Diagnoses:   Patient Active Problem List   Diagnosis    Anxiety state    Essential hypertension, benign    Hearing loss    Vitamin D deficiency    Severe Osteopenia    Abdominal aortic aneurysm    Hyperlipidemia    Old myocardial infarction    Peptic ulcer    Postsurgical percutaneous transluminal coronary angioplasty status    Cataracts, bilateral    Vascular disease    DENAE (acute kidney injury)    Hypokalemia    Hypernatremia    Alzheimer's disease (HCC)    Bradycardia    Chest pain       Admission Condition: poor    Discharged Condition: poor    Discharge Exam:  VITALS:  /61   Pulse 67   Temp 98 °F (36.7 °C) (Axillary)   Resp 16   Ht 1.6 m (5' 2.99\")   Wt 47 kg (103 lb 9.6 oz)   SpO2 96%   BMI 18.36 kg/m²   CONSTITUTIONAL:  lethargic, wakes up on calling but mumbles with eyes closed  LUNGS:  clear to auscultation bilaterally, No increased work of breathing, good air exchange, no crackles or wheezing  CARDIOVASCULAR:  Regular rate and rhythm, normal S1 and S2, no S3 or S4, and no significant murmurs, rubs or gallops noted.       Hospital Course:   79 y.o. female with pmh of advanced Alzheimer's dementia, who presents with DENAE ,hypotension, hyponatremia which was Improving w iv fluid but oral intake remains very pooor.pt was though to  be in baseline end stage dementia and family decided on hospice care at SNF      Meningioma?  Imaging shows signs of meningioma with  large extra-axial mass in the right sesamoid with mass effect on the rohith, right temporal lobe, pituitary glands, right cavernous carotid artery suggestive of large skull base meningioma.  Unable to do MRI due to patient's mentation,  Chief Complaint   Patient presents with    Abnormal Lab     Sent by everett from [facility] for a K 3.1 from

## 2025-08-07 NOTE — PROGRESS NOTES
Physician Progress Note      PATIENT:               RICHARD JEFFREY  CSN #:                  [id number]  :                       1945  ADMIT DATE:       2025 3:06 PM  DISCH DATE:        2025 4:42 PM  RESPONDING  PROVIDER #:        Handy Zaldivar MD          QUERY TEXT:    Based on your medical judgment, please clarify these findings and document if   any of the following are being evaluated and/or treated:    The clinical indicators include:  - 78 yo female admitted with hypernatremia  - Na 170 on admission  - CT done on  shows \"Interval growth of a large extra-axial mass of the   right sphenoid/clivus with mass effect on the rohith, right temporal lobe,   pituitary gland, and right cavernous carotid artery suggestive of a large   skull base meningioma.  These findings are not well characterized in the   absence of contrast.  Hypernatremia may relate to pituitary involvement.\"  Options provided:  -- Brain compression  -- No brain compression  -- Other - I will add my own diagnosis  -- Disagree - Not applicable / Not valid  -- Disagree - Clinically unable to determine / Unknown  -- Refer to Clinical Documentation Reviewer    PROVIDER RESPONSE TEXT:    This patient has brain compression    Query created by: Yoselin Noel on 2025 6:02 AM      Electronically signed by:  Handy Zaldivar MD 2025 5:09 PM           No 11-Feb-2021 23:00

## (undated) DEVICE — Z DISCONTINUED USE 2276105 GOWN PROTCT UNIV CHST W28IN L49IN SL 24IN BLU SPUNBOND FLM

## (undated) DEVICE — CONMED SCOPE SAVER BITE BLOCK, 20X27 MM: Brand: SCOPE SAVER

## (undated) DEVICE — FORCEPS BX L240CM DIA2.4MM L NDL RAD JAW 4 133340

## (undated) DEVICE — AIRLIFE™ NASAL OXYGEN CANNULA CURVED, NONFLARED TIP, WITH 7' FEET (2.1 M) CRUSH RESISTANT TUBING, OVER-THE-EAR STYLE: Brand: AIRLIFE™

## (undated) DEVICE — ELECTRODE ECG MONITR FOAM TEAR DROP ADLT RED